# Patient Record
Sex: FEMALE | Race: OTHER | HISPANIC OR LATINO | Employment: FULL TIME | URBAN - NONMETROPOLITAN AREA
[De-identification: names, ages, dates, MRNs, and addresses within clinical notes are randomized per-mention and may not be internally consistent; named-entity substitution may affect disease eponyms.]

---

## 2021-10-10 ENCOUNTER — APPOINTMENT (EMERGENCY)
Dept: ULTRASOUND IMAGING | Facility: HOSPITAL | Age: 36
End: 2021-10-10
Payer: COMMERCIAL

## 2021-10-10 ENCOUNTER — HOSPITAL ENCOUNTER (EMERGENCY)
Facility: HOSPITAL | Age: 36
Discharge: HOME | End: 2021-10-10
Attending: EMERGENCY MEDICINE
Payer: COMMERCIAL

## 2021-10-10 VITALS
RESPIRATION RATE: 18 BRPM | OXYGEN SATURATION: 98 % | WEIGHT: 190 LBS | BODY MASS INDEX: 31.65 KG/M2 | SYSTOLIC BLOOD PRESSURE: 101 MMHG | HEIGHT: 65 IN | DIASTOLIC BLOOD PRESSURE: 52 MMHG | TEMPERATURE: 98.7 F | HEART RATE: 83 BPM

## 2021-10-10 DIAGNOSIS — R82.71 BACTERIA IN URINE: ICD-10-CM

## 2021-10-10 DIAGNOSIS — O20.9 VAGINAL BLEEDING IN PREGNANCY, FIRST TRIMESTER: Primary | ICD-10-CM

## 2021-10-10 LAB
ABO GROUP BLD: NORMAL
B-HCG SERPL-ACNC: 9882.4 MIU/ML
BACTERIA UR QL AUTO: NORMAL /HPF
BASOPHILS # BLD AUTO: 0.06 THOUSANDS/ΜL (ref 0–0.1)
BASOPHILS NFR BLD AUTO: 1 % (ref 0–1)
BILIRUB UR QL STRIP: NEGATIVE
CLARITY UR: ABNORMAL
COLOR UR: YELLOW
EOSINOPHIL # BLD AUTO: 0.39 THOUSAND/ΜL (ref 0–0.61)
EOSINOPHIL NFR BLD AUTO: 5 % (ref 0–6)
ERYTHROCYTE [DISTWIDTH] IN BLOOD BY AUTOMATED COUNT: 13.6 % (ref 11.6–15.1)
GLUCOSE UR STRIP-MCNC: NEGATIVE MG/DL
HCT VFR BLD AUTO: 43.2 % (ref 34.8–46.1)
HGB BLD-MCNC: 14 G/DL (ref 11.5–15.4)
HGB UR QL STRIP.AUTO: NEGATIVE
IMM GRANULOCYTES # BLD AUTO: 0.02 THOUSAND/UL (ref 0–0.2)
IMM GRANULOCYTES NFR BLD AUTO: 0 % (ref 0–2)
KETONES UR STRIP-MCNC: NEGATIVE MG/DL
LEUKOCYTE ESTERASE UR QL STRIP: ABNORMAL
LYMPHOCYTES # BLD AUTO: 2.13 THOUSANDS/ΜL (ref 0.6–4.47)
LYMPHOCYTES NFR BLD AUTO: 27 % (ref 14–44)
MCH RBC QN AUTO: 26.6 PG (ref 26.8–34.3)
MCHC RBC AUTO-ENTMCNC: 32.4 G/DL (ref 31.4–37.4)
MCV RBC AUTO: 82 FL (ref 82–98)
MONOCYTES # BLD AUTO: 0.39 THOUSAND/ΜL (ref 0.17–1.22)
MONOCYTES NFR BLD AUTO: 5 % (ref 4–12)
NEUTROPHILS # BLD AUTO: 4.95 THOUSANDS/ΜL (ref 1.85–7.62)
NEUTS SEG NFR BLD AUTO: 62 % (ref 43–75)
NITRITE UR QL STRIP: NEGATIVE
NON-SQ EPI CELLS URNS QL MICRO: NORMAL /HPF
NRBC BLD AUTO-RTO: 0 /100 WBCS
PH UR STRIP.AUTO: 6.5 [PH]
PLATELET # BLD AUTO: 363 THOUSANDS/UL (ref 149–390)
PMV BLD AUTO: 10.8 FL (ref 8.9–12.7)
PROT UR STRIP-MCNC: NEGATIVE MG/DL
RBC # BLD AUTO: 5.27 MILLION/UL (ref 3.81–5.12)
RBC #/AREA URNS AUTO: NORMAL /HPF
RH BLD: POSITIVE
SP GR UR STRIP.AUTO: 1.01 (ref 1–1.03)
UROBILINOGEN UR QL STRIP.AUTO: 0.2 E.U./DL
WBC # BLD AUTO: 7.94 THOUSAND/UL (ref 4.31–10.16)
WBC #/AREA URNS AUTO: NORMAL /HPF

## 2021-10-10 PROCEDURE — 36415 COLL VENOUS BLD VENIPUNCTURE: CPT | Performed by: EMERGENCY MEDICINE

## 2021-10-10 PROCEDURE — 85025 COMPLETE CBC W/AUTO DIFF WBC: CPT | Performed by: EMERGENCY MEDICINE

## 2021-10-10 PROCEDURE — 99284 EMERGENCY DEPT VISIT MOD MDM: CPT | Performed by: EMERGENCY MEDICINE

## 2021-10-10 PROCEDURE — 81001 URINALYSIS AUTO W/SCOPE: CPT | Performed by: EMERGENCY MEDICINE

## 2021-10-10 PROCEDURE — 86900 BLOOD TYPING SEROLOGIC ABO: CPT | Performed by: EMERGENCY MEDICINE

## 2021-10-10 PROCEDURE — 99284 EMERGENCY DEPT VISIT MOD MDM: CPT

## 2021-10-10 PROCEDURE — 86901 BLOOD TYPING SEROLOGIC RH(D): CPT | Performed by: EMERGENCY MEDICINE

## 2021-10-10 PROCEDURE — 87086 URINE CULTURE/COLONY COUNT: CPT | Performed by: EMERGENCY MEDICINE

## 2021-10-10 PROCEDURE — 84702 CHORIONIC GONADOTROPIN TEST: CPT | Performed by: EMERGENCY MEDICINE

## 2021-10-10 PROCEDURE — 76801 OB US < 14 WKS SINGLE FETUS: CPT

## 2021-10-10 RX ORDER — PNV NO.95/FERROUS FUM/FOLIC AC 28MG-0.8MG
1 TABLET ORAL DAILY
Qty: 30 TABLET | Refills: 0 | Status: SHIPPED | OUTPATIENT
Start: 2021-10-10 | End: 2022-01-25

## 2021-10-10 RX ORDER — OMEPRAZOLE 20 MG/1
20 CAPSULE, DELAYED RELEASE ORAL DAILY
COMMUNITY

## 2021-10-10 RX ORDER — CEPHALEXIN 500 MG/1
500 CAPSULE ORAL EVERY 8 HOURS SCHEDULED
Qty: 15 CAPSULE | Refills: 0 | Status: SHIPPED | OUTPATIENT
Start: 2021-10-10 | End: 2021-10-15

## 2021-10-11 LAB — BACTERIA UR CULT: NORMAL

## 2021-10-13 ENCOUNTER — APPOINTMENT (EMERGENCY)
Dept: ULTRASOUND IMAGING | Facility: HOSPITAL | Age: 36
End: 2021-10-13
Payer: COMMERCIAL

## 2021-10-13 ENCOUNTER — HOSPITAL ENCOUNTER (EMERGENCY)
Facility: HOSPITAL | Age: 36
Discharge: HOME | End: 2021-10-13
Attending: EMERGENCY MEDICINE
Payer: COMMERCIAL

## 2021-10-13 VITALS
BODY MASS INDEX: 31.62 KG/M2 | DIASTOLIC BLOOD PRESSURE: 68 MMHG | SYSTOLIC BLOOD PRESSURE: 108 MMHG | HEART RATE: 80 BPM | WEIGHT: 190 LBS | OXYGEN SATURATION: 99 % | RESPIRATION RATE: 18 BRPM | TEMPERATURE: 97.8 F

## 2021-10-13 DIAGNOSIS — O03.9 COMPLETE MISCARRIAGE: Primary | ICD-10-CM

## 2021-10-13 LAB
ANION GAP SERPL CALCULATED.3IONS-SCNC: 12 MMOL/L (ref 4–13)
B-HCG SERPL-ACNC: 2638.6 MIU/ML
BASOPHILS # BLD AUTO: 0.07 THOUSANDS/ΜL (ref 0–0.1)
BASOPHILS NFR BLD AUTO: 1 % (ref 0–1)
BUN SERPL-MCNC: 8 MG/DL (ref 5–25)
CALCIUM SERPL-MCNC: 9.1 MG/DL (ref 8.3–10.1)
CHLORIDE SERPL-SCNC: 100 MMOL/L (ref 100–108)
CO2 SERPL-SCNC: 25 MMOL/L (ref 21–32)
CREAT SERPL-MCNC: 0.64 MG/DL (ref 0.6–1.3)
EOSINOPHIL # BLD AUTO: 0.58 THOUSAND/ΜL (ref 0–0.61)
EOSINOPHIL NFR BLD AUTO: 5 % (ref 0–6)
ERYTHROCYTE [DISTWIDTH] IN BLOOD BY AUTOMATED COUNT: 13.7 % (ref 11.6–15.1)
GFR SERPL CREATININE-BSD FRML MDRD: 114 ML/MIN/1.73SQ M
GLUCOSE SERPL-MCNC: 95 MG/DL (ref 65–140)
HCT VFR BLD AUTO: 42.6 % (ref 34.8–46.1)
HGB BLD-MCNC: 13.6 G/DL (ref 11.5–15.4)
IMM GRANULOCYTES # BLD AUTO: 0.03 THOUSAND/UL (ref 0–0.2)
IMM GRANULOCYTES NFR BLD AUTO: 0 % (ref 0–2)
LYMPHOCYTES # BLD AUTO: 2.63 THOUSANDS/ΜL (ref 0.6–4.47)
LYMPHOCYTES NFR BLD AUTO: 23 % (ref 14–44)
MCH RBC QN AUTO: 26.3 PG (ref 26.8–34.3)
MCHC RBC AUTO-ENTMCNC: 31.9 G/DL (ref 31.4–37.4)
MCV RBC AUTO: 82 FL (ref 82–98)
MONOCYTES # BLD AUTO: 0.6 THOUSAND/ΜL (ref 0.17–1.22)
MONOCYTES NFR BLD AUTO: 5 % (ref 4–12)
NEUTROPHILS # BLD AUTO: 7.45 THOUSANDS/ΜL (ref 1.85–7.62)
NEUTS SEG NFR BLD AUTO: 66 % (ref 43–75)
NRBC BLD AUTO-RTO: 0 /100 WBCS
PLATELET # BLD AUTO: 369 THOUSANDS/UL (ref 149–390)
PMV BLD AUTO: 10.5 FL (ref 8.9–12.7)
POTASSIUM SERPL-SCNC: 4.1 MMOL/L (ref 3.5–5.3)
RBC # BLD AUTO: 5.18 MILLION/UL (ref 3.81–5.12)
SODIUM SERPL-SCNC: 137 MMOL/L (ref 136–145)
WBC # BLD AUTO: 11.36 THOUSAND/UL (ref 4.31–10.16)

## 2021-10-13 PROCEDURE — 80048 BASIC METABOLIC PNL TOTAL CA: CPT | Performed by: PHYSICIAN ASSISTANT

## 2021-10-13 PROCEDURE — 96374 THER/PROPH/DIAG INJ IV PUSH: CPT

## 2021-10-13 PROCEDURE — 84702 CHORIONIC GONADOTROPIN TEST: CPT | Performed by: PHYSICIAN ASSISTANT

## 2021-10-13 PROCEDURE — 99285 EMERGENCY DEPT VISIT HI MDM: CPT | Performed by: PHYSICIAN ASSISTANT

## 2021-10-13 PROCEDURE — 76815 OB US LIMITED FETUS(S): CPT

## 2021-10-13 PROCEDURE — 96375 TX/PRO/DX INJ NEW DRUG ADDON: CPT

## 2021-10-13 PROCEDURE — 36415 COLL VENOUS BLD VENIPUNCTURE: CPT | Performed by: PHYSICIAN ASSISTANT

## 2021-10-13 PROCEDURE — 99284 EMERGENCY DEPT VISIT MOD MDM: CPT

## 2021-10-13 PROCEDURE — 85025 COMPLETE CBC W/AUTO DIFF WBC: CPT | Performed by: PHYSICIAN ASSISTANT

## 2021-10-13 RX ORDER — KETOROLAC TROMETHAMINE 10 MG/1
10 TABLET, FILM COATED ORAL EVERY 6 HOURS PRN
Qty: 20 TABLET | Refills: 0 | Status: SHIPPED | OUTPATIENT
Start: 2021-10-13 | End: 2022-01-25

## 2021-10-13 RX ORDER — ONDANSETRON 2 MG/ML
4 INJECTION INTRAMUSCULAR; INTRAVENOUS ONCE
Status: COMPLETED | OUTPATIENT
Start: 2021-10-13 | End: 2021-10-13

## 2021-10-13 RX ORDER — KETOROLAC TROMETHAMINE 30 MG/ML
15 INJECTION, SOLUTION INTRAMUSCULAR; INTRAVENOUS ONCE
Status: COMPLETED | OUTPATIENT
Start: 2021-10-13 | End: 2021-10-13

## 2021-10-13 RX ORDER — OXYCODONE HYDROCHLORIDE 10 MG/1
10 TABLET ORAL ONCE
Status: COMPLETED | OUTPATIENT
Start: 2021-10-13 | End: 2021-10-13

## 2021-10-13 RX ORDER — ACETAMINOPHEN 325 MG/1
650 TABLET ORAL ONCE
Status: COMPLETED | OUTPATIENT
Start: 2021-10-13 | End: 2021-10-13

## 2021-10-13 RX ORDER — HYDROCODONE BITARTRATE AND ACETAMINOPHEN 5; 325 MG/1; MG/1
1 TABLET ORAL EVERY 6 HOURS PRN
Qty: 6 TABLET | Refills: 0 | Status: SHIPPED | OUTPATIENT
Start: 2021-10-13 | End: 2022-01-25

## 2021-10-13 RX ADMIN — MORPHINE SULFATE 2 MG: 2 INJECTION, SOLUTION INTRAMUSCULAR; INTRAVENOUS at 12:32

## 2021-10-13 RX ADMIN — ONDANSETRON 4 MG: 2 INJECTION INTRAMUSCULAR; INTRAVENOUS at 12:32

## 2021-10-13 RX ADMIN — OXYCODONE HYDROCHLORIDE 10 MG: 10 TABLET ORAL at 14:56

## 2021-10-13 RX ADMIN — KETOROLAC TROMETHAMINE 15 MG: 30 INJECTION, SOLUTION INTRAMUSCULAR; INTRAVENOUS at 14:57

## 2021-10-13 RX ADMIN — ACETAMINOPHEN 650 MG: 325 TABLET, FILM COATED ORAL at 12:31

## 2021-12-15 ENCOUNTER — HOSPITAL ENCOUNTER (EMERGENCY)
Facility: HOSPITAL | Age: 36
Discharge: HOME | End: 2021-12-15
Attending: EMERGENCY MEDICINE
Payer: COMMERCIAL

## 2021-12-15 ENCOUNTER — APPOINTMENT (EMERGENCY)
Dept: ULTRASOUND IMAGING | Facility: HOSPITAL | Age: 36
End: 2021-12-15
Payer: COMMERCIAL

## 2021-12-15 VITALS
SYSTOLIC BLOOD PRESSURE: 128 MMHG | BODY MASS INDEX: 31.65 KG/M2 | DIASTOLIC BLOOD PRESSURE: 72 MMHG | WEIGHT: 190 LBS | HEART RATE: 91 BPM | RESPIRATION RATE: 18 BRPM | HEIGHT: 65 IN | OXYGEN SATURATION: 98 % | TEMPERATURE: 98.7 F

## 2021-12-15 DIAGNOSIS — O20.9 VAGINAL BLEEDING IN PREGNANCY, FIRST TRIMESTER: Primary | ICD-10-CM

## 2021-12-15 LAB
ALBUMIN SERPL BCP-MCNC: 4.1 G/DL (ref 3.5–5)
ALP SERPL-CCNC: 74 U/L (ref 46–116)
ALT SERPL W P-5'-P-CCNC: 33 U/L (ref 12–78)
ANION GAP SERPL CALCULATED.3IONS-SCNC: 8 MMOL/L (ref 4–13)
AST SERPL W P-5'-P-CCNC: 24 U/L (ref 5–45)
B-HCG SERPL-ACNC: 532.8 MIU/ML
BACTERIA UR QL AUTO: ABNORMAL /HPF
BASOPHILS # BLD AUTO: 0.05 THOUSANDS/ΜL (ref 0–0.1)
BASOPHILS NFR BLD AUTO: 1 % (ref 0–1)
BILIRUB SERPL-MCNC: 0.45 MG/DL (ref 0.2–1)
BILIRUB UR QL STRIP: NEGATIVE
BUN SERPL-MCNC: 10 MG/DL (ref 5–25)
CALCIUM SERPL-MCNC: 9.4 MG/DL (ref 8.3–10.1)
CHLORIDE SERPL-SCNC: 103 MMOL/L (ref 100–108)
CLARITY UR: ABNORMAL
CO2 SERPL-SCNC: 28 MMOL/L (ref 21–32)
COLOR UR: ABNORMAL
CREAT SERPL-MCNC: 0.79 MG/DL (ref 0.6–1.3)
EOSINOPHIL # BLD AUTO: 0.39 THOUSAND/ΜL (ref 0–0.61)
EOSINOPHIL NFR BLD AUTO: 4 % (ref 0–6)
ERYTHROCYTE [DISTWIDTH] IN BLOOD BY AUTOMATED COUNT: 14.4 % (ref 11.6–15.1)
GFR SERPL CREATININE-BSD FRML MDRD: 95 ML/MIN/1.73SQ M
GLUCOSE SERPL-MCNC: 98 MG/DL (ref 65–140)
GLUCOSE UR STRIP-MCNC: NEGATIVE MG/DL
HCT VFR BLD AUTO: 40.9 % (ref 34.8–46.1)
HGB BLD-MCNC: 13.5 G/DL (ref 11.5–15.4)
HGB UR QL STRIP.AUTO: ABNORMAL
IMM GRANULOCYTES # BLD AUTO: 0.02 THOUSAND/UL (ref 0–0.2)
IMM GRANULOCYTES NFR BLD AUTO: 0 % (ref 0–2)
KETONES UR STRIP-MCNC: NEGATIVE MG/DL
LEUKOCYTE ESTERASE UR QL STRIP: ABNORMAL
LIPASE SERPL-CCNC: 85 U/L (ref 73–393)
LYMPHOCYTES # BLD AUTO: 2.45 THOUSANDS/ΜL (ref 0.6–4.47)
LYMPHOCYTES NFR BLD AUTO: 24 % (ref 14–44)
MCH RBC QN AUTO: 26.6 PG (ref 26.8–34.3)
MCHC RBC AUTO-ENTMCNC: 33 G/DL (ref 31.4–37.4)
MCV RBC AUTO: 81 FL (ref 82–98)
MONOCYTES # BLD AUTO: 0.63 THOUSAND/ΜL (ref 0.17–1.22)
MONOCYTES NFR BLD AUTO: 6 % (ref 4–12)
NEUTROPHILS # BLD AUTO: 6.59 THOUSANDS/ΜL (ref 1.85–7.62)
NEUTS SEG NFR BLD AUTO: 65 % (ref 43–75)
NITRITE UR QL STRIP: NEGATIVE
NON-SQ EPI CELLS URNS QL MICRO: ABNORMAL /HPF
NRBC BLD AUTO-RTO: 0 /100 WBCS
PH UR STRIP.AUTO: 7.5 [PH]
PLATELET # BLD AUTO: 364 THOUSANDS/UL (ref 149–390)
PMV BLD AUTO: 10.8 FL (ref 8.9–12.7)
POTASSIUM SERPL-SCNC: 4.2 MMOL/L (ref 3.5–5.3)
PROT SERPL-MCNC: 7.9 G/DL (ref 6.4–8.2)
PROT UR STRIP-MCNC: ABNORMAL MG/DL
RBC # BLD AUTO: 5.08 MILLION/UL (ref 3.81–5.12)
RBC #/AREA URNS AUTO: ABNORMAL /HPF
SODIUM SERPL-SCNC: 139 MMOL/L (ref 136–145)
SP GR UR STRIP.AUTO: 1.02 (ref 1–1.03)
UROBILINOGEN UR QL STRIP.AUTO: 0.2 E.U./DL
WBC # BLD AUTO: 10.13 THOUSAND/UL (ref 4.31–10.16)
WBC #/AREA URNS AUTO: ABNORMAL /HPF

## 2021-12-15 PROCEDURE — 99284 EMERGENCY DEPT VISIT MOD MDM: CPT

## 2021-12-15 PROCEDURE — 96365 THER/PROPH/DIAG IV INF INIT: CPT

## 2021-12-15 PROCEDURE — 81001 URINALYSIS AUTO W/SCOPE: CPT | Performed by: EMERGENCY MEDICINE

## 2021-12-15 PROCEDURE — 96366 THER/PROPH/DIAG IV INF ADDON: CPT

## 2021-12-15 PROCEDURE — 99285 EMERGENCY DEPT VISIT HI MDM: CPT | Performed by: EMERGENCY MEDICINE

## 2021-12-15 PROCEDURE — 85025 COMPLETE CBC W/AUTO DIFF WBC: CPT | Performed by: EMERGENCY MEDICINE

## 2021-12-15 PROCEDURE — 76801 OB US < 14 WKS SINGLE FETUS: CPT

## 2021-12-15 PROCEDURE — 87086 URINE CULTURE/COLONY COUNT: CPT | Performed by: EMERGENCY MEDICINE

## 2021-12-15 PROCEDURE — 80053 COMPREHEN METABOLIC PANEL: CPT | Performed by: EMERGENCY MEDICINE

## 2021-12-15 PROCEDURE — 96375 TX/PRO/DX INJ NEW DRUG ADDON: CPT

## 2021-12-15 PROCEDURE — 84702 CHORIONIC GONADOTROPIN TEST: CPT | Performed by: EMERGENCY MEDICINE

## 2021-12-15 PROCEDURE — 87147 CULTURE TYPE IMMUNOLOGIC: CPT | Performed by: EMERGENCY MEDICINE

## 2021-12-15 PROCEDURE — 36415 COLL VENOUS BLD VENIPUNCTURE: CPT | Performed by: EMERGENCY MEDICINE

## 2021-12-15 PROCEDURE — 83690 ASSAY OF LIPASE: CPT | Performed by: EMERGENCY MEDICINE

## 2021-12-15 RX ORDER — ONDANSETRON 2 MG/ML
4 INJECTION INTRAMUSCULAR; INTRAVENOUS ONCE
Status: COMPLETED | OUTPATIENT
Start: 2021-12-15 | End: 2021-12-15

## 2021-12-15 RX ORDER — NITROFURANTOIN 25; 75 MG/1; MG/1
100 CAPSULE ORAL 2 TIMES DAILY
Qty: 14 CAPSULE | Refills: 0 | Status: SHIPPED | OUTPATIENT
Start: 2021-12-15 | End: 2021-12-22

## 2021-12-15 RX ORDER — FENTANYL CITRATE 50 UG/ML
25 INJECTION, SOLUTION INTRAMUSCULAR; INTRAVENOUS ONCE
Status: COMPLETED | OUTPATIENT
Start: 2021-12-15 | End: 2021-12-15

## 2021-12-15 RX ADMIN — FENTANYL CITRATE 25 MCG: 0.05 INJECTION, SOLUTION INTRAMUSCULAR; INTRAVENOUS at 15:05

## 2021-12-15 RX ADMIN — ONDANSETRON 4 MG: 2 INJECTION INTRAMUSCULAR; INTRAVENOUS at 15:03

## 2021-12-15 RX ADMIN — SODIUM CHLORIDE, SODIUM LACTATE, POTASSIUM CHLORIDE, AND CALCIUM CHLORIDE 1000 ML: .6; .31; .03; .02 INJECTION, SOLUTION INTRAVENOUS at 13:34

## 2021-12-16 LAB
BACTERIA UR CULT: ABNORMAL
BACTERIA UR CULT: ABNORMAL

## 2022-01-25 ENCOUNTER — APPOINTMENT (EMERGENCY)
Dept: ULTRASOUND IMAGING | Facility: HOSPITAL | Age: 37
End: 2022-01-25
Payer: COMMERCIAL

## 2022-01-25 ENCOUNTER — APPOINTMENT (EMERGENCY)
Dept: CT IMAGING | Facility: HOSPITAL | Age: 37
End: 2022-01-25
Payer: COMMERCIAL

## 2022-01-25 ENCOUNTER — HOSPITAL ENCOUNTER (EMERGENCY)
Facility: HOSPITAL | Age: 37
Discharge: HOME | End: 2022-01-25
Attending: EMERGENCY MEDICINE | Admitting: EMERGENCY MEDICINE
Payer: COMMERCIAL

## 2022-01-25 VITALS
TEMPERATURE: 99 F | HEART RATE: 84 BPM | WEIGHT: 201.06 LBS | SYSTOLIC BLOOD PRESSURE: 103 MMHG | DIASTOLIC BLOOD PRESSURE: 51 MMHG | OXYGEN SATURATION: 99 % | BODY MASS INDEX: 33.46 KG/M2 | RESPIRATION RATE: 18 BRPM

## 2022-01-25 DIAGNOSIS — E34.9 ELEVATED SERUM HCG: ICD-10-CM

## 2022-01-25 DIAGNOSIS — M54.50 LOW BACK PAIN: ICD-10-CM

## 2022-01-25 DIAGNOSIS — N39.0 URINARY TRACT INFECTION: ICD-10-CM

## 2022-01-25 DIAGNOSIS — R10.31 RLQ ABDOMINAL PAIN: Primary | ICD-10-CM

## 2022-01-25 LAB
ALBUMIN SERPL BCP-MCNC: 4.2 G/DL (ref 3.5–5)
ALP SERPL-CCNC: 74 U/L (ref 46–116)
ALT SERPL W P-5'-P-CCNC: 34 U/L (ref 12–78)
ANION GAP SERPL CALCULATED.3IONS-SCNC: 10 MMOL/L (ref 4–13)
AST SERPL W P-5'-P-CCNC: 29 U/L (ref 5–45)
B-HCG SERPL-ACNC: 317.8 MIU/ML
BACTERIA UR QL AUTO: ABNORMAL /HPF
BASOPHILS # BLD AUTO: 0.06 THOUSANDS/ΜL (ref 0–0.1)
BASOPHILS NFR BLD AUTO: 1 % (ref 0–1)
BILIRUB SERPL-MCNC: 0.74 MG/DL (ref 0.2–1)
BILIRUB UR QL STRIP: NEGATIVE
BUN SERPL-MCNC: 14 MG/DL (ref 5–25)
CALCIUM SERPL-MCNC: 9.2 MG/DL (ref 8.3–10.1)
CHLORIDE SERPL-SCNC: 99 MMOL/L (ref 100–108)
CLARITY UR: ABNORMAL
CO2 SERPL-SCNC: 24 MMOL/L (ref 21–32)
COLOR UR: YELLOW
CREAT SERPL-MCNC: 0.84 MG/DL (ref 0.6–1.3)
EOSINOPHIL # BLD AUTO: 0.3 THOUSAND/ΜL (ref 0–0.61)
EOSINOPHIL NFR BLD AUTO: 3 % (ref 0–6)
ERYTHROCYTE [DISTWIDTH] IN BLOOD BY AUTOMATED COUNT: 14.5 % (ref 11.6–15.1)
EXT PREG TEST URINE: POSITIVE
EXT. CONTROL ED NAV: NORMAL
GFR SERPL CREATININE-BSD FRML MDRD: 89 ML/MIN/1.73SQ M
GLUCOSE SERPL-MCNC: 98 MG/DL (ref 65–140)
GLUCOSE UR STRIP-MCNC: NEGATIVE MG/DL
HCT VFR BLD AUTO: 43.8 % (ref 34.8–46.1)
HGB BLD-MCNC: 14.5 G/DL (ref 11.5–15.4)
HGB UR QL STRIP.AUTO: ABNORMAL
IMM GRANULOCYTES # BLD AUTO: 0.03 THOUSAND/UL (ref 0–0.2)
IMM GRANULOCYTES NFR BLD AUTO: 0 % (ref 0–2)
KETONES UR STRIP-MCNC: ABNORMAL MG/DL
LACTATE SERPL-SCNC: 0.8 MMOL/L (ref 0.5–2)
LEUKOCYTE ESTERASE UR QL STRIP: ABNORMAL
LYMPHOCYTES # BLD AUTO: 2.5 THOUSANDS/ΜL (ref 0.6–4.47)
LYMPHOCYTES NFR BLD AUTO: 27 % (ref 14–44)
MCH RBC QN AUTO: 26 PG (ref 26.8–34.3)
MCHC RBC AUTO-ENTMCNC: 33.1 G/DL (ref 31.4–37.4)
MCV RBC AUTO: 79 FL (ref 82–98)
MONOCYTES # BLD AUTO: 0.64 THOUSAND/ΜL (ref 0.17–1.22)
MONOCYTES NFR BLD AUTO: 7 % (ref 4–12)
NEUTROPHILS # BLD AUTO: 5.66 THOUSANDS/ΜL (ref 1.85–7.62)
NEUTS SEG NFR BLD AUTO: 62 % (ref 43–75)
NITRITE UR QL STRIP: NEGATIVE
NON-SQ EPI CELLS URNS QL MICRO: ABNORMAL /HPF
NRBC BLD AUTO-RTO: 0 /100 WBCS
PH UR STRIP.AUTO: 6 [PH]
PLATELET # BLD AUTO: 331 THOUSANDS/UL (ref 149–390)
PMV BLD AUTO: 11 FL (ref 8.9–12.7)
POTASSIUM SERPL-SCNC: 3.5 MMOL/L (ref 3.5–5.3)
PROT SERPL-MCNC: 8.4 G/DL (ref 6.4–8.2)
PROT UR STRIP-MCNC: NEGATIVE MG/DL
RBC # BLD AUTO: 5.57 MILLION/UL (ref 3.81–5.12)
RBC #/AREA URNS AUTO: ABNORMAL /HPF
SODIUM SERPL-SCNC: 133 MMOL/L (ref 136–145)
SP GR UR STRIP.AUTO: >=1.03 (ref 1–1.03)
UROBILINOGEN UR QL STRIP.AUTO: 0.2 E.U./DL
WBC # BLD AUTO: 9.19 THOUSAND/UL (ref 4.31–10.16)
WBC #/AREA URNS AUTO: ABNORMAL /HPF

## 2022-01-25 PROCEDURE — 96361 HYDRATE IV INFUSION ADD-ON: CPT

## 2022-01-25 PROCEDURE — 81001 URINALYSIS AUTO W/SCOPE: CPT | Performed by: EMERGENCY MEDICINE

## 2022-01-25 PROCEDURE — 83605 ASSAY OF LACTIC ACID: CPT | Performed by: EMERGENCY MEDICINE

## 2022-01-25 PROCEDURE — 76801 OB US < 14 WKS SINGLE FETUS: CPT

## 2022-01-25 PROCEDURE — 80053 COMPREHEN METABOLIC PANEL: CPT | Performed by: EMERGENCY MEDICINE

## 2022-01-25 PROCEDURE — 87086 URINE CULTURE/COLONY COUNT: CPT | Performed by: EMERGENCY MEDICINE

## 2022-01-25 PROCEDURE — 36415 COLL VENOUS BLD VENIPUNCTURE: CPT | Performed by: EMERGENCY MEDICINE

## 2022-01-25 PROCEDURE — 74177 CT ABD & PELVIS W/CONTRAST: CPT

## 2022-01-25 PROCEDURE — 99284 EMERGENCY DEPT VISIT MOD MDM: CPT

## 2022-01-25 PROCEDURE — 85025 COMPLETE CBC W/AUTO DIFF WBC: CPT | Performed by: EMERGENCY MEDICINE

## 2022-01-25 PROCEDURE — G1004 CDSM NDSC: HCPCS

## 2022-01-25 PROCEDURE — 99285 EMERGENCY DEPT VISIT HI MDM: CPT | Performed by: EMERGENCY MEDICINE

## 2022-01-25 PROCEDURE — 96375 TX/PRO/DX INJ NEW DRUG ADDON: CPT

## 2022-01-25 PROCEDURE — 81025 URINE PREGNANCY TEST: CPT | Performed by: EMERGENCY MEDICINE

## 2022-01-25 PROCEDURE — 96376 TX/PRO/DX INJ SAME DRUG ADON: CPT

## 2022-01-25 PROCEDURE — 84702 CHORIONIC GONADOTROPIN TEST: CPT | Performed by: EMERGENCY MEDICINE

## 2022-01-25 PROCEDURE — 96374 THER/PROPH/DIAG INJ IV PUSH: CPT

## 2022-01-25 RX ORDER — CEPHALEXIN 250 MG/1
500 CAPSULE ORAL ONCE
Status: COMPLETED | OUTPATIENT
Start: 2022-01-25 | End: 2022-01-25

## 2022-01-25 RX ORDER — CEPHALEXIN 500 MG/1
500 CAPSULE ORAL EVERY 8 HOURS SCHEDULED
Qty: 12 CAPSULE | Refills: 0 | Status: SHIPPED | OUTPATIENT
Start: 2022-01-25 | End: 2022-01-29

## 2022-01-25 RX ORDER — HYDROMORPHONE HCL/PF 1 MG/ML
0.5 SYRINGE (ML) INJECTION ONCE
Status: COMPLETED | OUTPATIENT
Start: 2022-01-25 | End: 2022-01-25

## 2022-01-25 RX ORDER — KETOROLAC TROMETHAMINE 30 MG/ML
15 INJECTION, SOLUTION INTRAMUSCULAR; INTRAVENOUS ONCE
Status: DISCONTINUED | OUTPATIENT
Start: 2022-01-25 | End: 2022-01-25

## 2022-01-25 RX ORDER — ONDANSETRON 2 MG/ML
4 INJECTION INTRAMUSCULAR; INTRAVENOUS ONCE
Status: COMPLETED | OUTPATIENT
Start: 2022-01-25 | End: 2022-01-25

## 2022-01-25 RX ADMIN — HYDROMORPHONE HYDROCHLORIDE 0.5 MG: 1 INJECTION, SOLUTION INTRAMUSCULAR; INTRAVENOUS; SUBCUTANEOUS at 14:44

## 2022-01-25 RX ADMIN — SODIUM CHLORIDE 1000 ML: 9 INJECTION, SOLUTION INTRAVENOUS at 09:22

## 2022-01-25 RX ADMIN — IOHEXOL 100 ML: 350 INJECTION, SOLUTION INTRAVENOUS at 14:18

## 2022-01-25 RX ADMIN — ONDANSETRON 4 MG: 2 INJECTION INTRAMUSCULAR; INTRAVENOUS at 09:22

## 2022-01-25 RX ADMIN — HYDROMORPHONE HYDROCHLORIDE 0.5 MG: 1 INJECTION, SOLUTION INTRAMUSCULAR; INTRAVENOUS; SUBCUTANEOUS at 09:44

## 2022-01-25 RX ADMIN — CEPHALEXIN 500 MG: 250 CAPSULE ORAL at 15:33

## 2022-01-25 RX ADMIN — HYDROMORPHONE HYDROCHLORIDE 0.5 MG: 1 INJECTION, SOLUTION INTRAMUSCULAR; INTRAVENOUS; SUBCUTANEOUS at 11:29

## 2022-01-25 NOTE — Clinical Note
Eliot Velazquez was seen and treated in our emergency department on 1/25/2022  Diagnosis:     Bry Oneal  may return to work on return date  She may return on this date: 01/28/2022    Please excuse absence on 1/25 through 1/27     If you have any questions or concerns, please don't hesitate to call        Tessie Woodall MD    ______________________________           _______________          _______________  Hospital Representative                              Date                                Time

## 2022-01-25 NOTE — DISCHARGE INSTRUCTIONS
Please follow-up with outpatient lab to obtain your repeat HCG lab on Thursday, 1/27, in 2 days  Please establish care with OBGYN as soon as possible, particularly in light of your recent miscarriages and your current elevated hCG and abdominal pains  If you develop severe pain, lightheadedness, or profuse vaginal bleeding, please seek medical attention right away  Since you are having symptoms of urinary tract infection, please start cephalexin and completed the course of the antibiotic

## 2022-01-25 NOTE — ED PROVIDER NOTES
EMERGENCY DEPARTMENT ENCOUNTER NOTE    This note has been generated using a voice recognition software  There may be typographic, grammatic, or word substitution errors that have escaped editorial review  ? CHIEF COMPLAINT  Chief Complaint   Patient presents with    Back Pain     having hot flashes with low back pain radiating to right flank  some nausea noted       HPI  Mayo Severs is a 39 y o  female with PMH of sickle cell trait presenting with right lower back pain and right flank pain  Patient reports burning up at nighttime over the past 1 week  In the past 2 days, she developed low back pain with radiation to right flank that is quite bothersome, currently 8/10  She has been taking Tylenol without much relief  There is some nausea but no vomiting  Appetite has been decreased  No diarrhea  Does note some constipation, last bowel movement yesterday  No burning with urination  No vaginal discharge  Patient reports having 2 back-to-back spontaneous miscarriages with the most recent 1 in December, has not yet had a menstrual cycle since the last miscarriage  Past surgical history significant for cholecystectomy  REVIEW OF SYSTEMS    Constitutional:  Reports feeling hot flashes, denies chills  Visual/Eyes: no changes in vision  HENT: no rhinorrhea, no sore throat  Cardiac: no chest pain  Respiratory: no shortness of breath, no cough  GI:  As above  : no burning with urination  Heme/Onc: no easy bruising  Endocrine: no diabetes  Neuro: no focal weakness or numbness, no headaches    Ten systems reviewed and negative unless otherwise noted in HPI and above    PAST MEDICAL HISTORY  Past Medical History:   Diagnosis Date    Sickle cell trait ([facility] [address] )        SURGICAL HISTORY  Past Surgical History:   Procedure Laterality Date    CHOLECYSTECTOMY         FAMILY HISTORY  No family history on file  CURRENT MEDICATIONS  No current facility-administered medications on file prior to encounter  Current Outpatient Medications on File Prior to Encounter   Medication Sig    omeprazole (PriLOSEC) 20 mg delayed release capsule Take 20 mg by mouth daily    [DISCONTINUED] HYDROcodone-acetaminophen (Norco) 5-325 mg per tablet Take 1 tablet by mouth every 6 (six) hours as needed for painMax Daily Amount: 4 tablets    [DISCONTINUED] ketorolac (TORADOL) 10 mg tablet Take 1 tablet (10 mg total) by mouth every 6 (six) hours as needed for moderate pain    [DISCONTINUED] Prenatal Vit-Fe Fumarate-FA (prenatal vitamin) 28-0 8 mg Take 1 tablet by mouth daily for 30 doses       ALLERGIES  Allergies   Allergen Reactions    Shellfish Allergy - Food Allergy Hives       SOCIAL HISTORY  Social History     Socioeconomic History    Marital status: Single     Spouse name: Not on file    Number of children: Not on file    Years of education: Not on file    Highest education level: Not on file   Occupational History    Not on file   Tobacco Use    Smoking status: Never Smoker    Smokeless tobacco: Never Used   Vaping Use    Vaping Use: Never used   Substance and Sexual Activity    Alcohol use: Never    Drug use: Never    Sexual activity: Not on file   Other Topics Concern    Not on file   Social History Narrative    Not on file     Social Determinants of Health     Financial Resource Strain: Not on file   Food Insecurity: Not on file   Transportation Needs: Not on file   Physical Activity: Not on file   Stress: Not on file   Social Connections: Not on file   Intimate Partner Violence: Not on file   Housing Stability: Not on file       PHYSICAL EXAM    /75 (BP Location: Left arm)   Pulse (!) 126   Temp 99 °F (37 2 °C) (Temporal)   Resp 18   Wt 91 2 kg (201 lb 1 oz)   LMP 08/16/2021 (LMP Unknown)   SpO2 99%   Breastfeeding Unknown Comment: had misscarriage  dec 2021  BMI 33 46 kg/m²   Vital signs and nursing notes reviewed    CONSTITUTIONAL: female appearing stated age resting in bed, in no acute distress  HEENT: atraumatic, normocephalic  Sclera anicteric, conjunctiva are not injected  Moist oral mucosa  CARDIOVASCULAR/CHEST:  Tachycardic, regular, no M/R/G  2+ radial pulses  PULMONARY: Breathing comfortably on RA  Breath sounds are equal and clear to auscultation  ABDOMEN: non-distended  BS present, mildly hyperactive, tender to palpation in right periumbilical region and right upper quadrant  No rebound or guarding  MSK: moves all extremities, no deformities, no peripheral edema, no calf asymmetry  NEURO: Awake, alert, and oriented x 3  Face symmetric  Moves all extremities spontaneously  No focal neurologic deficits  SKIN: Warm, appears well-perfused  MENTAL STATUS: Normal affect      ?   LABS AND TESTS    Results Reviewed     Procedure Component Value Units Date/Time    Urine culture [[id number]] Collected: 01/25/22 0921    Lab Status: Final result Specimen: Urine, Clean Catch Updated: 01/26/22 1347     Urine Culture No Growth <1000 cfu/mL    Comprehensive metabolic panel [[id number]]  (Abnormal) Collected: 01/25/22 0921    Lab Status: Final result Specimen: Blood from Arm, Right Updated: 01/25/22 0957     Sodium 133 mmol/L      Potassium 3 5 mmol/L      Chloride 99 mmol/L      CO2 24 mmol/L      ANION GAP 10 mmol/L      BUN 14 mg/dL      Creatinine 0 84 mg/dL      Glucose 98 mg/dL      Calcium 9 2 mg/dL      AST 29 U/L      ALT 34 U/L      Alkaline Phosphatase 74 U/L      Total Protein 8 4 g/dL      Albumin 4 2 g/dL      Total Bilirubin 0 74 mg/dL      eGFR 89 ml/min/1 73sq m     Narrative:      [place] guidelines for Chronic Kidney Disease (CKD):     Stage 1 with normal or high GFR (GFR > 90 mL/min/1 73 square meters)    Stage 2 Mild CKD (GFR = 60-89 mL/min/1 73 square meters)    Stage 3A Moderate CKD (GFR = 45-59 mL/min/1 73 square meters)    Stage 3B Moderate CKD (GFR = 30-44 mL/min/1 73 square meters)    Stage 4 Severe CKD (GFR = 15-29 mL/min/1 73 square meters)   Stage 5 End Stage CKD (GFR <15 mL/min/1 73 square meters)  Note: GFR calculation is accurate only with a steady state creatinine    hCG, quantitative [[id number]]  (Abnormal) Collected: 01/25/22 0921    Lab Status: Final result Specimen: Blood from Arm, Right Updated: 01/25/22 0957     HCG, Quant 317 8 mIU/mL     Narrative:       Expected Ranges:     Approximate               Approximate HCG  Gestation age          Concentration ( mIU/mL)  _____________          ______________________   Michael Heredia                      HCG values  0 2-1                       5-50  1-2                           2-3                         100-5000  3-4                         500-06454  4-5                         1000-19351  5-6                         [id number]  6-8                         55699-275130  8-12                        43907-039450      Lactic acid [[id number]]  (Normal) Collected: 01/25/22 0921    Lab Status: Final result Specimen: Blood from Arm, Right Updated: 01/25/22 0950     LACTIC ACID 0 8 mmol/L     Narrative:      Result may be elevated if tourniquet was used during collection      Urine Microscopic [[id number]]  (Abnormal) Collected: 01/25/22 0921    Lab Status: Final result Specimen: Urine, Clean Catch Updated: 01/25/22 0944     RBC, UA 4-10 /hpf      WBC, UA 10-20 /hpf      Epithelial Cells Moderate /hpf      Bacteria, UA Moderate /hpf     UA w Reflex to Microscopic w Reflex to Culture [[id number]]  (Abnormal) Collected: 01/25/22 0921    Lab Status: Final result Specimen: Urine, Clean Catch Updated: 01/25/22 0936     Color, UA Yellow     Clarity, UA Slightly Cloudy     Specific Gravity, UA >=1 030     pH, UA 6 0     Leukocytes, UA Small     Nitrite, UA Negative     Protein, UA Negative mg/dl      Glucose, UA Negative mg/dl      Ketones, UA 15 (1+) mg/dl      Urobilinogen, UA 0 2 E U /dl      Bilirubin, UA Negative     Blood, UA Trace-Intact    CBC and differential [[id number]]  (Abnormal) Collected: 01/25/22 8192    Lab Status: Final result Specimen: Blood from Arm, Right Updated: 22     WBC 9 19 Thousand/uL      RBC 5 57 Million/uL      Hemoglobin 14 5 g/dL      Hematocrit 43 8 %      MCV 79 fL      MCH 26 0 pg      MCHC 33 1 g/dL      RDW 14 5 %      MPV 11 0 fL      Platelets 962 Thousands/uL      nRBC 0 /100 WBCs      Neutrophils Relative 62 %      Immat GRANS % 0 %      Lymphocytes Relative 27 %      Monocytes Relative 7 %      Eosinophils Relative 3 %      Basophils Relative 1 %      Neutrophils Absolute 5 66 Thousands/µL      Immature Grans Absolute 0 03 Thousand/uL      Lymphocytes Absolute 2 50 Thousands/µL      Monocytes Absolute 0 64 Thousand/µL      Eosinophils Absolute 0 30 Thousand/µL      Basophils Absolute 0 06 Thousands/µL     POCT pregnancy, urine [[id number]]  (Normal) Resulted: 22    Lab Status: Final result Updated: 22     EXT PREG TEST UR (Ref: Negative) Positive     Control Valid          CT abdomen pelvis with contrast   Final Result by Carmen Waller MD ( 1449)      Enlarged uterus with thickened, heterogeneous uterine cavity  Retained products of conception should be considered  3 1 cm left ovarian cyst             Workstation performed: [id number]         US OB < 14 weeks with transvaginal   Final Result by Nadiya Plaza MD ( 1303)      No intrauterine gestation or adnexal mass identified  There are collections of simple fluid in the endometrial cavity within the fundus larger on the left than the right  Although spontaneous  is favored, differential still includes early    intrauterine pregnancy and ectopic pregnancy  Correlation with serial quantitative beta hCG is recommended        Workstation performed: [id number]             ED COURSE & MEDICAL DECISION MAKING  Procedures             Medications   sodium chloride 0 9 % bolus 1,000 mL (0 mL Intravenous Stopped 22 1445)   ondansetron (ZOFRAN) injection 4 mg (4 mg Intravenous Given 1/25/22 0922)   HYDROmorphone (DILAUDID) injection 0 5 mg (0 5 mg Intravenous Given 1/25/22 0944)   HYDROmorphone (DILAUDID) injection 0 5 mg (0 5 mg Intravenous Given 1/25/22 1129)   iohexol (OMNIPAQUE) 350 MG/ML injection (SINGLE-DOSE) 100 mL (100 mL Intravenous Given 1/25/22 1418)   HYDROmorphone (DILAUDID) injection 0 5 mg (0 5 mg Intravenous Given 1/25/22 1444)   cephalexin (KEFLEX) capsule 500 mg (500 mg Oral Given 1/25/22 133)     48-year-old female presenting with significant rate sided/right periumbilical abdominal pain  Vital signs reviewed, tachycardic, normotensive, within normal limits otherwise  Medical record reviewed, patient recently had 2 miscarriages in October and again in December  Differential diagnosis for patient's presentation includes acute appendicitis, colitis, diverticulitis, ruptured ovarian cyst, ovarian torsion, pregnancy including ectopic pregnancy, versus another etiology of symptoms  Patient does note that whenever she gets low back pain, it usually does suggest urinary tract infection for her even if she does not developed urinary burning/frequency  I am concerned by patient's tachycardia  With patient supine, heart rate was 106, however, when I set the patient up to auscultate her lungs, her heart rate shot up to 131  I am concerned at this time for either severe dehydration or possible intra-abdominal process such as intra-abdominal bleeding  Labs obtained, revealing unremarkable CMP, normal lactate, normal CBC with normal hemoglobin of 14 5, not significantly changed from hemoglobin obtained on December 15th  UA is not consistent with UTI, however, patient has positive urine pregnancy test   HCG obtained and is elevated at 317 8  Medical record is reviewed  After patient had her last miscarriage in December, she had hCG trend all the way down to less than 2 per [facility] records    Elevated hCG today is suggestive of a new pregnancy versus ectopic pregnancy  Although patient has no suprapubic abdominal pain and has more of right upper and right periumbilical abdominal pain, we will 1st pursue ultrasound of the pelvic organs to identify intrauterine pregnancy and to attempt to rule out ectopic pregnancy in the adnexa  Ultrasound was obtained and is as above, no intrauterine gestation or adnexal mass identified at this time  Patient is requiring multiple opioid analgesics  Given severity of pain and tachycardia, I am concerned that patient may have an acute intra-abdominal process such as an aberrant ectopic pregnancy, acute appendicitis, versus another potentially surgical process with high risk for deterioration and even death  I had an extensive decision making discussion with the patient including pursuing additional imaging such as CT of the abdomen  Following shared decision making during which we discussed the risks of radiation exposure in this patient with potential first-trimester pregnancy, including injury/damage to embryo including miscarriage, fetal abnormalities, etc   as well as risk of CT associated malignancy to the patient, as well as risk of missing acute intra-abdominal condition that could lead to significant morbidity or even mortality, patient would like to proceed with CT imaging of the abdomen which I feel is appropriate  CT abdomen/pelvis obtained, thankfully revealing no acute abdomen  Concerning patient's uterine findings, patient did have an ultrasound earlier today with findings as above  Retained products of conception are a consideration, as is first-trimester pregnancy, as well as miscarriage  At this time, we will obtain a repeat HCG in 36-48 hours and will refer the patient to [address] [place], [place] [address]  per patient request   On re-evaluation, patient reports that her symptoms are improved  She is concerned by her lower back pain and presence of bacteria in her urine    We will send urine for culture and start patient on a course of antibiotics for possible urinary tract infection, although technically presence of moderate epithelial cells suggests and improperly collected sample more so than a UTI  Patient discharged to home with recommendations for symptom control, return precautions, and plan for follow up  Patient is under strict return precautions  ED Course as of 01/27/22 0836   Tue Jan 25, 2022   1115 Discussed the Hcg with the patient       MDM    CLINICAL IMPRESSION  Final diagnoses:   RLQ abdominal pain   Low back pain   Elevated serum hCG   Urinary tract infection       DISPOSITION  Time reflects when diagnosis was documented in both MDM as applicable and the Disposition within this note     Time User Action Codes Description Comment    1/25/2022  3:22 PM Harmeet Sequin Add [R10 31] RLQ abdominal pain     1/25/2022  3:22 PM Harmeet Sequin Add [M54 50] Low back pain     1/25/2022  3:22 PM Harmeet Sequin Add [E34 9] Elevated serum hCG     1/25/2022  3:23 PM Harmeet Sequin Add [N39 0] Urinary tract infection       ED Disposition     ED Disposition Condition Date/Time Comment    Discharge Stable Tue Jan 25, 2022  3:21 PM Monique Chirinos discharge to home/self care              Follow-up Information     Follow up With Specialties Details Why [place] [address] Emergency Department Emergency Medicine Go to  As needed, If symptoms worsen [address] [phone]  [facility] Emergency Department, [address], [address]   Discharge Medication List as of 1/25/2022  3:26 PM      START taking these medications    Details   cephalexin (KEFLEX) 500 mg capsule Take 1 capsule (500 mg total) by mouth every 8 (eight) hours for 4 days, Starting Tue 1/25/2022, Until Sat 1/29/2022, Normal         CONTINUE these medications which have NOT CHANGED    Details   omeprazole (PriLOSEC) 20 mg delayed release capsule Take 20 mg by mouth daily, Historical Med              Robby Arrieta MD  01/27/22 8707

## 2022-01-26 LAB — BACTERIA UR CULT: NORMAL

## 2022-04-26 ENCOUNTER — OFFICE VISIT (OUTPATIENT)
Dept: FAMILY MEDICINE CLINIC | Facility: CLINIC | Age: 37
End: 2022-04-26
Payer: COMMERCIAL

## 2022-04-26 VITALS
TEMPERATURE: 97.3 F | HEART RATE: 78 BPM | WEIGHT: 200.2 LBS | BODY MASS INDEX: 33.36 KG/M2 | HEIGHT: 65 IN | OXYGEN SATURATION: 98 % | SYSTOLIC BLOOD PRESSURE: 102 MMHG | DIASTOLIC BLOOD PRESSURE: 70 MMHG

## 2022-04-26 DIAGNOSIS — R20.2 PARESTHESIA AND PAIN OF BOTH UPPER EXTREMITIES: Primary | ICD-10-CM

## 2022-04-26 DIAGNOSIS — R29.898 BILATERAL ARM WEAKNESS: ICD-10-CM

## 2022-04-26 DIAGNOSIS — M79.602 PARESTHESIA AND PAIN OF BOTH UPPER EXTREMITIES: Primary | ICD-10-CM

## 2022-04-26 DIAGNOSIS — M79.601 PARESTHESIA AND PAIN OF BOTH UPPER EXTREMITIES: Primary | ICD-10-CM

## 2022-04-26 PROBLEM — D57.3 SICKLE CELL TRAIT (HCC): Status: ACTIVE | Noted: 2017-05-15

## 2022-04-26 PROBLEM — K21.9 GASTROESOPHAGEAL REFLUX DISEASE WITHOUT ESOPHAGITIS: Status: ACTIVE | Noted: 2022-04-26

## 2022-04-26 PROBLEM — E28.2 PCOS (POLYCYSTIC OVARIAN SYNDROME): Status: ACTIVE | Noted: 2021-03-24

## 2022-04-26 PROCEDURE — 1036F TOBACCO NON-USER: CPT | Performed by: PHYSICIAN ASSISTANT

## 2022-04-26 PROCEDURE — 99204 OFFICE O/P NEW MOD 45 MIN: CPT | Performed by: PHYSICIAN ASSISTANT

## 2022-04-26 PROCEDURE — 3008F BODY MASS INDEX DOCD: CPT | Performed by: PHYSICIAN ASSISTANT

## 2022-04-26 PROCEDURE — 3725F SCREEN DEPRESSION PERFORMED: CPT | Performed by: PHYSICIAN ASSISTANT

## 2022-04-26 NOTE — PROGRESS NOTES
Assessment/Plan:    Problem List Items Addressed This Visit     None      Visit Diagnoses     Paresthesia and pain of both upper extremities    -  Primary    Relevant Orders    MRI cervical spine wo contrast    EMG 2 Limb Upper Extremity    Bilateral arm weakness        Relevant Orders    MRI cervical spine wo contrast    EMG 2 Limb Upper Extremity           Diagnoses and all orders for this visit:    Paresthesia and pain of both upper extremities  -     MRI cervical spine wo contrast; Future  -     EMG 2 Limb Upper Extremity; Future    Bilateral arm weakness  -     MRI cervical spine wo contrast; Future  -     EMG 2 Limb Upper Extremity; Future              Subjective:      Patient ID: Camilla Parada is a 39 y o  female  Osker Dine is here today to establish care with our office  Since Saturday (4/23/2022), pt woke with pain, weakness, numbness, tingling in bilateral arms from shoulders to hands  She struggles to grab mugs, has to use one hand to assist the other  Reports having some neck pain few months ago  Hands fall asleep, wake her at night  The following portions of the patient's history were reviewed and updated as appropriate:   She has a past medical history of Sickle cell trait ([facility] [address] )  ,  does not have any pertinent problems on file  ,   has a past surgical history that includes Cholecystectomy  ,  family history is not on file  ,   reports that she has never smoked  She has never used smokeless tobacco  She reports that she does not drink alcohol and does not use drugs  ,  is allergic to shellfish allergy - food allergy     Current Outpatient Medications   Medication Sig Dispense Refill    omeprazole (PriLOSEC) 20 mg delayed release capsule Take 20 mg by mouth daily       No current facility-administered medications for this visit  Review of Systems   Constitutional: Negative for activity change, appetite change, chills, diaphoresis, fatigue, fever and unexpected weight change     HENT: Negative for congestion, ear pain, postnasal drip, rhinorrhea, sinus pressure, sinus pain, sneezing, sore throat, tinnitus and voice change  Eyes: Negative for pain, redness and visual disturbance  Respiratory: Negative for cough, chest tightness, shortness of breath and wheezing  Cardiovascular: Negative for chest pain, palpitations and leg swelling  Gastrointestinal: Negative for abdominal pain, blood in stool, constipation, diarrhea, nausea and vomiting  Genitourinary: Negative for difficulty urinating, dysuria, frequency, hematuria and urgency  Musculoskeletal: Positive for neck pain  Negative for arthralgias, back pain, gait problem, joint swelling, myalgias and neck stiffness  Skin: Negative for color change, pallor, rash and wound  Neurological: Positive for weakness and numbness  Negative for dizziness, tremors, light-headedness and headaches  Psychiatric/Behavioral: Negative for dysphoric mood, self-injury, sleep disturbance and suicidal ideas  The patient is not nervous/anxious  Objective:  Vitals:    04/26/22 0851   BP: 102/70   Pulse: 78   Temp: (!) 97 3 °F (36 3 °C)   SpO2: 98%   Weight: 90 8 kg (200 lb 3 2 oz)   Height: 5' 5" (1 651 m)     Body mass index is 33 32 kg/m²  Physical Exam  Vitals reviewed  Constitutional:       General: She is not in acute distress  Appearance: She is well-developed  She is not diaphoretic  HENT:      Head: Normocephalic and atraumatic  Right Ear: Hearing, tympanic membrane, ear canal and external ear normal       Left Ear: Hearing, tympanic membrane, ear canal and external ear normal       Mouth/Throat:      Pharynx: Uvula midline  No oropharyngeal exudate  Eyes:      General: No scleral icterus  Right eye: No discharge  Left eye: No discharge  Conjunctiva/sclera: Conjunctivae normal    Neck:      Thyroid: No thyromegaly  Vascular: No carotid bruit     Cardiovascular:      Rate and Rhythm: Normal rate and regular rhythm  Heart sounds: Normal heart sounds  No murmur heard  Pulmonary:      Effort: Pulmonary effort is normal  No respiratory distress  Breath sounds: Normal breath sounds  No wheezing  Abdominal:      General: Bowel sounds are normal  There is no distension  Palpations: Abdomen is soft  There is no mass  Tenderness: There is no abdominal tenderness  There is no guarding or rebound  Musculoskeletal:         General: No tenderness  Normal range of motion  Cervical back: Neck supple  Lymphadenopathy:      Cervical: No cervical adenopathy  Skin:     General: Skin is warm and dry  Findings: No erythema or rash  Neurological:      Mental Status: She is alert and oriented to person, place, and time  Motor: Weakness (bilateral UE with weakness, L>R) present  Psychiatric:         Behavior: Behavior normal          Thought Content:  Thought content normal          Judgment: Judgment normal

## 2022-05-03 ENCOUNTER — HOSPITAL ENCOUNTER (OUTPATIENT)
Dept: MRI IMAGING | Facility: HOSPITAL | Age: 37
Discharge: HOME | End: 2022-05-03
Payer: COMMERCIAL

## 2022-05-03 DIAGNOSIS — M79.601 PARESTHESIA AND PAIN OF BOTH UPPER EXTREMITIES: ICD-10-CM

## 2022-05-03 DIAGNOSIS — R29.898 BILATERAL ARM WEAKNESS: ICD-10-CM

## 2022-05-03 DIAGNOSIS — M79.602 PARESTHESIA AND PAIN OF BOTH UPPER EXTREMITIES: ICD-10-CM

## 2022-05-03 DIAGNOSIS — R20.2 PARESTHESIA AND PAIN OF BOTH UPPER EXTREMITIES: ICD-10-CM

## 2022-05-03 PROCEDURE — 72141 MRI NECK SPINE W/O DYE: CPT

## 2022-05-03 PROCEDURE — G1004 CDSM NDSC: HCPCS

## 2022-05-04 DIAGNOSIS — M79.601 PARESTHESIA AND PAIN OF BOTH UPPER EXTREMITIES: Primary | ICD-10-CM

## 2022-05-04 DIAGNOSIS — R29.898 BILATERAL ARM WEAKNESS: ICD-10-CM

## 2022-05-04 DIAGNOSIS — R20.2 PARESTHESIA AND PAIN OF BOTH UPPER EXTREMITIES: Primary | ICD-10-CM

## 2022-05-04 DIAGNOSIS — M79.602 PARESTHESIA AND PAIN OF BOTH UPPER EXTREMITIES: Primary | ICD-10-CM

## 2022-05-04 DIAGNOSIS — R90.89 ABNORMAL MRI, SPINAL CORD: ICD-10-CM

## 2022-05-06 ENCOUNTER — TELEPHONE (OUTPATIENT)
Dept: FAMILY MEDICINE CLINIC | Facility: CLINIC | Age: 37
End: 2022-05-06

## 2022-05-06 NOTE — TELEPHONE ENCOUNTER
PT Name: Rodolfo Messina  MR #: [id number]  Physician Query Form - CKD Clarification     CDS/: Negra Melara RN, CCDS             Contact information: [email or web address]  This form is a permanent document in the medical record.     Query Date: May 25, 2018    By submitting this query, we are merely seeking further clarification of documentation. Please utilize your independent clinical judgment when addressing the question(s) below.    The Medical record contains the following:     Indicators   Supporting Clinical Findings   Location in Medical Record   X CKD or Chronic Kidney (Renal) Failure / Disease Chronic Renal Disease 5/24 anesthesia pre-op   X BUN/Creatinine                          GFR Cr 1.7-->1.4-->1.3  gfr 40.8->51.6-->56.5 5/23- 5/25 lab    Dehydration      Nausea / Vomiting      Dialysis / CRRT      Medication      Treatment     X Other Chronic Conditions CHF, htn, cad, bph 5/23 h/p   X Other Bun/Cr 34/1.7 (baseline Cr 1.4-1.6) 5/23 h/p     Provider, please further specify the stage of CKD.      [  ] Chronic Kidney Disease (CKD) (please specify stage* below)       [employer] Definitions  Stage Description  eGFR (mL/min)   [  ]     I Slight kidney damage with normal or increased filtration 90+   [  ]     II Mildly reduced kidney function 60-89   [ x ]     III Moderately reduced kidney function 30-59                 [  ] Other (please specify): ________________________  [  ] Clinically Undetermined    Please document in your progress notes daily for the duration of treatment until resolved and include in your discharge summary.                       Sanya Sutherland already have this in the system so not sure what they are looking for? [facility]      ----- Message from César Chaudhry sent at 5/6/2022  6:44 AM EDT -----  Regarding: FW: Neurosurgeon     ----- Message -----  From: Diana Benson  Sent: 5/5/2022   5:59 PM EDT  To: LATANYA VASQUEZVIRY [facility]  Subject: Neurosurgeon                                     Good afternoon [facility]     I was able to get an appointment with the neurosurgeon but the  informed me I needed a referral because of my insurance  Are you able to issue that referral? My appointment is for May 17,2022  Thank you and have a great afternoon

## 2022-05-10 ENCOUNTER — PROCEDURE VISIT (OUTPATIENT)
Dept: NEUROLOGY | Facility: CLINIC | Age: 37
End: 2022-05-10
Payer: COMMERCIAL

## 2022-05-10 DIAGNOSIS — R20.2 PARESTHESIA OF SKIN: ICD-10-CM

## 2022-05-10 PROCEDURE — 95911 NRV CNDJ TEST 9-10 STUDIES: CPT | Performed by: PHYSICAL MEDICINE & REHABILITATION

## 2022-05-10 PROCEDURE — 95886 MUSC TEST DONE W/N TEST COMP: CPT | Performed by: PHYSICAL MEDICINE & REHABILITATION

## 2022-05-10 NOTE — PROGRESS NOTES
EMG 2 Limb Upper Extremity     Date/Time 5/10/2022 11:26 AM     Performed by  Hamilton Gutierrez MD     Authorized by Naz Hager PA-C                [facility] AT [address]  [phone]    Electromyography & Nerve Conduction Studies Report          Full Name: Adriana Gonzales Gender: Female  MRN: [id number] YOB: 1985      Visit Date: 5/10/2022 10:54 AM  Age: 39 Years  Examining Physician: Hamilton Gutierrez MD   Referring Physician: Faye Prajapati PA-C    Medical History: 58-year-old right-handed female presents with complaints of sudden onset bilateral arm tingling, numbness, pain and weakness radiating from the shoulders to the hands  She also notes neck pain that started a few months ago  Patient is being evaluated for focal neuropathy versus cervical radiculopathy  TEMP 32      Sensory Nerve Conduction Study       Nerve / Sites Rec  Site Onset Lat Peak Lat  Amp Segments Distance Velocity     ms ms µV  cm m/s   R Median - Dig II (Antidromic)      Wrist Index 2 3 3 4 68 4 Wrist - Index 13 55      Ref  ?3 5 ? 20 0 Ref  ?50   L Median - Dig II (Antidromic)      Wrist Index 2 3 3 3 101 5 Wrist - Index 13 55      Ref  ?3 5 ? 20 0 Ref  ?50   R Ulnar - Dig V (Antidromic)      Wrist Dig V 2 0 3 0 41 2 Wrist - Dig V 11 54      Ref  ?3 1 ? 17 0 Ref  ?50   L Ulnar - Dig V (Antidromic)      Wrist Dig V 1 9 2 9 40 0 Wrist - Dig V 11 57      Ref  ?3 1 ? 17 0 Ref  ?50   R Radial - Superficial (Antidromic)      Forearm Wrist 1 5 2 1 48 1 Forearm - Wrist 10 66      Ref  ?2 9 ? 15 0 Ref  ?50   L Radial - Superficial (Antidromic)      Forearm Wrist 1 5 2 1 56 0 Forearm - Wrist 10 66      Ref  ?2 9 ? 15 0 Ref  ?50       Motor Nerve Conduction Study       Nerve / Sites Muscle Latency Ref  Amplitude Ref  Segments Distance Lat Diff Velocity Ref       ms ms mV mV  cm ms m/s m/s   R Median - APB      Wrist APB 3 4 ?4 4 13 6 ?4 0 Wrist - APB 7 Elbow APB 7 2  13 2  Elbow - Wrist 22 3 81 58 ?49   L Median - APB      Wrist APB 2 8 ?4 4 14 5 ?4 0 Wrist - APB 7         Elbow APB 6 5  14 1  Elbow - Wrist 22 3 65 60 ?49   R Ulnar - ADM      Wrist ADM 2 4 ?3 3 8 8 ?6 0 Wrist - ADM 7         B  Elbow ADM 5 7  8 0  B  Elbow - Wrist 21 3 31 63 ?49      A  Elbow ADM 7 1  7 9  A  Elbow - B  Elbow 10 1 48 68 ?49   L Ulnar - ADM      Wrist ADM 2 4 ?3 3 8 5 ?6 0 Wrist - ADM 7         B  Elbow ADM 5 6  8 4  B  Elbow - Wrist 21 3 25 65 ?49      A  Elbow ADM 7 1  8 1  A  Elbow - B  Elbow 10 1 48 68 ?49       F Waves       Nerve F Latency Ref  ms ms   R Median - APB 24 8 ? 31 0   R Ulnar - ADM 23 9 ?32 0   L Median - APB 24 8 ? 31 0   L Ulnar - ADM 23 9 ?32 0       EMG Summary Table     Spontaneous MUAP Recruitment   Muscle Nerve Roots IA Fib PSW Fasc H F  Dur  Amp PPP Config Pattern   L  First dorsal interosseous Ulnar C8-T1 NL None None None None NL NL None NL NL   L  Deltoid Axillary C5-C6 NL None None None None NL NL None NL NL   R  Deltoid Axillary C5-C6 NL None None None None NL NL None NL NL   R  First dorsal interosseous Ulnar C8-T1 NL None None None None NL NL None NL NL   L  Biceps brachii Musculocut  C5-C6 NL None None None None NL NL None NL NL   R  Biceps brachii Musculocut  C5-C6 NL None None None None Sl  Incr  Gr  Incr  Few NL Reduced   L  Triceps brachii Radial C6-C8 NL None None None None NL NL None NL NL   R  Triceps brachii Radial C6-C8 NL None None None None NL NL None NL NL   L  Pronator teres Median C6-C7 NL None None None None Gr  Incr  Gr  Incr  Many NL Reduced   R  Pronator teres Median C6-C7 NL None None None None NL NL None NL NL   L  Abductor pollicis brevis Median Z9-C0 NL None None None None NL NL None NL NL   R  Abductor pollicis brevis Median Q9-I8 NL None None None None NL NL None NL NL   L  Cervical paraspinals (mid)  - NL None None None None NL NL None NL NL   R   Cervical paraspinals (mid)  - NL None None None None NL NL None NL NL Summary      The left and right median and ulnar motor conduction velocities and compound muscle action potentials were normal with normal distal latencies across the wrists  The left and right median and ulnar sensory conduction velocity and sensory action potentials were also normal with normal distal latencies across the wrists  The left and right median and ulnar F wave latencies were within normal limits  Concentric needle EMG was performed on selected muscles of the bilateral upper extremities  Was no evidence of active denervation in any of the muscles tested  Moderate decreased recruitment of giant motor units was noted in the left pronator teres and biceps  Early recruited motor units appear normal with recruitment patterns being full or full for effort in the remaining muscles tested  Impression:      Abnormal study  There is electrophysiologic evidence of a:    1  Chronic C6 radiculopathy on the left as evidenced by the decreased recruitment and chronic denervation changes in the above-mentioned muscles  2   There is no evidence of a median or ulnar neuropathy bilaterally

## 2022-05-17 ENCOUNTER — CONSULT (OUTPATIENT)
Dept: NEUROSURGERY | Facility: CLINIC | Age: 37
End: 2022-05-17
Payer: COMMERCIAL

## 2022-05-17 VITALS
WEIGHT: 200 LBS | BODY MASS INDEX: 33.32 KG/M2 | TEMPERATURE: 98.2 F | SYSTOLIC BLOOD PRESSURE: 110 MMHG | HEIGHT: 65 IN | DIASTOLIC BLOOD PRESSURE: 74 MMHG | HEART RATE: 85 BPM | RESPIRATION RATE: 16 BRPM

## 2022-05-17 DIAGNOSIS — M79.601 PARESTHESIA AND PAIN OF BOTH UPPER EXTREMITIES: ICD-10-CM

## 2022-05-17 DIAGNOSIS — M79.602 PARESTHESIA AND PAIN OF BOTH UPPER EXTREMITIES: ICD-10-CM

## 2022-05-17 DIAGNOSIS — R20.2 PARESTHESIA AND PAIN OF BOTH UPPER EXTREMITIES: ICD-10-CM

## 2022-05-17 PROCEDURE — 99203 OFFICE O/P NEW LOW 30 MIN: CPT | Performed by: NEUROLOGICAL SURGERY

## 2022-05-17 PROCEDURE — 1036F TOBACCO NON-USER: CPT | Performed by: NEUROLOGICAL SURGERY

## 2022-05-17 PROCEDURE — 3008F BODY MASS INDEX DOCD: CPT | Performed by: NEUROLOGICAL SURGERY

## 2022-05-17 RX ORDER — GABAPENTIN 100 MG/1
100 CAPSULE ORAL
Qty: 30 CAPSULE | Refills: 3 | Status: SHIPPED | OUTPATIENT
Start: 2022-05-17

## 2022-05-17 RX ORDER — METHOCARBAMOL 750 MG/1
750 TABLET, FILM COATED ORAL EVERY 6 HOURS PRN
Qty: 30 TABLET | Refills: 3 | Status: SHIPPED | OUTPATIENT
Start: 2022-05-17

## 2022-05-17 NOTE — PROGRESS NOTES
Neurosurgery Office Note  Ganesh Curiel 39 y o  female MRN: [id number]      Assessment/Plan        Problem List Items Addressed This Visit    None     Visit Diagnoses     Paresthesia and pain of both upper extremities        Relevant Medications    methocarbamol (Robaxin-750) 750 mg tablet    gabapentin (Neurontin) 100 mg capsule    Other Relevant Orders    Ambulatory Referral to Physical Therapy    Ambulatory Referral to Pain Management            Discussion:  Patient is a 58-year-old female with h/o sickle cell trait who p/w several months of neck pain (Rolly Gordon feels stiff) and intermittent bilateral arm pain (left > right), diffusely throughout both arms, initially started only as paresthesias then delayed pain  No persistent numbness, weakness, gait imbalance, find motor dysfunction in bilateral hands, bowel/bladder changes, recent trauma, prior spinal surgery  Currently not prescribed any pain medications, takes Naproxen PRN only  Never evaluated by PT or Pain Medicine  Not taking AC  Non-smoker  MRI on 5/3/22 showed partial disc collapse and disc herniation at C5/6 a/w L>R foraminal stenosis without significant cord compression or cord signal changes  Neurologically intact on exam without long tract signs  At this time, given intact exam, I recommend initial conservative therapies per PT and Pain Medicine  I will also prescribe trials of Robaxin and Neurontin for her current symptoms  No acute neurosurgical intervention indicated  Return to clinic in 3 months to reassess symptoms after conservative measures  All questions and concerns were addressed during this visit  CHIEF COMPLAINT    Chief Complaint   Patient presents with    Consult    Neck Pain       HISTORY    History of Present Illness     39y o  year old female     HPI    See Discussion    REVIEW OF SYSTEMS    Review of Systems   Constitutional: Negative  HENT: Negative  Eyes: Negative      Respiratory: Negative  Cardiovascular: Negative  Gastrointestinal: Negative  Endocrine: Negative  Genitourinary: Negative  Musculoskeletal: Positive for gait problem, neck pain (for about 1 month-center neck pain into bi/arm into thumb and middle to pinky in left/right arm to thumb and pinky, left is worse) and neck stiffness  Meds; ibuprofen  no recent conserative managements  EMG 4/26 & 5/11  Neurology   pain 5/10  non smoker/blood thinners   Neurological: Positive for weakness (weakness in bi/arm-hands, left is worse), numbness (bi/arm and hands, more so in left) and headaches (starting in the occipital, at times left eye pressure)  Psychiatric/Behavioral: Negative  Meds/Allergies     Current Outpatient Medications   Medication Sig Dispense Refill    omeprazole (PriLOSEC) 20 mg delayed release capsule Take 20 mg by mouth daily       No current facility-administered medications for this visit  Allergies   Allergen Reactions    Shellfish Allergy - Food Allergy Hives       PAST HISTORY    Past Medical History:   Diagnosis Date    Sickle cell trait ([facility] [address] )        Past Surgical History:   Procedure Laterality Date    CHOLECYSTECTOMY         Social History     Tobacco Use    Smoking status: Never Smoker    Smokeless tobacco: Never Used   Vaping Use    Vaping Use: Never used   Substance Use Topics    Alcohol use: Never    Drug use: Never       No family history on file  The following portions of the patient's history were reviewed in this encounter and updated as appropriate: Past medical, surgical, family, and social history, as well as medications, allergies, and review of systems  EXAM    Vitals:Blood pressure 110/74, pulse 85, temperature 98 2 °F (36 8 °C), temperature source Tympanic, resp  rate 16, height 5' 5" (1 651 m), weight 90 7 kg (200 lb), unknown if currently breastfeeding  ,Body mass index is 33 28 kg/m²       Physical Exam  Constitutional:       Appearance: Normal appearance  HENT:      Head: Normocephalic and atraumatic  Eyes:      Extraocular Movements: Extraocular movements intact and EOM normal       Pupils: Pupils are equal, round, and reactive to light  Cardiovascular:      Rate and Rhythm: Normal rate and regular rhythm  Pulses: Normal pulses  Heart sounds: Normal heart sounds  Pulmonary:      Effort: Pulmonary effort is normal       Breath sounds: Normal breath sounds  Abdominal:      General: Abdomen is flat  Palpations: Abdomen is soft  Musculoskeletal:         General: Normal range of motion  Cervical back: Normal range of motion  Skin:     General: Skin is warm  Neurological:      General: No focal deficit present  Mental Status: She is alert and oriented to person, place, and time  Mental status is at baseline  Gait: Gait is intact  Deep Tendon Reflexes: Strength normal    Psychiatric:         Mood and Affect: Mood normal          Speech: Speech normal          Behavior: Behavior normal          Neurologic Exam     Mental Status   Oriented to person, place, and time  Attention: normal    Speech: speech is normal   Level of consciousness: alert    Cranial Nerves     CN II   Visual fields full to confrontation  Visual acuity: normal  Right visual field deficit: none  Left visual field deficit: none     CN III, IV, VI   Pupils are equal, round, and reactive to light  Extraocular motions are normal    CN III: no CN III palsy  CN VI: no CN VI palsy  Nystagmus: none   Diplopia: none  Ophthalmoparesis: none  Upgaze: normal  Downgaze: normal  Conjugate gaze: present    CN V   Facial sensation intact  Right facial sensation deficit: none  Left facial sensation deficit: none    CN VII   Facial expression full, symmetric     Right facial weakness: none  Left facial weakness: none    CN VIII   CN VIII normal      CN IX, X   CN IX normal    CN X normal    Palate: symmetric    CN XI   CN XI normal    Right sternocleidomastoid strength: normal  Left sternocleidomastoid strength: normal  Right trapezius strength: normal  Left trapezius strength: normal    CN XII   CN XII normal    Tongue deviation: none    Motor Exam   Muscle bulk: normal  Overall muscle tone: normal  Right arm pronator drift: absent  Left arm pronator drift: absent    Strength   Strength 5/5 throughout  Sensory Exam   Light touch normal      Gait, Coordination, and Reflexes     Gait  Gait: normal    Reflexes   Reflexes 2+ except as noted  MEDICAL DECISION MAKING    Imaging Studies:     MRI cervical spine wo contrast    Result Date: 5/4/2022  Narrative: MRI CERVICAL SPINE WITHOUT CONTRAST INDICATION: R20 2: Paresthesia of skin M79 601: Pain in right arm M79 602: Pain in left arm R29 898: Other symptoms and signs involving the musculoskeletal system  COMPARISON:  None  TECHNIQUE:  Sagittal T1, sagittal T2, sagittal inversion recovery, axial T2, axial  2D merge IMAGE QUALITY:  Diagnostic FINDINGS: ALIGNMENT:  Minor straightening of normal cervical lordosis without subluxation or fracture  MARROW SIGNAL:  Normal marrow signal is identified within the visualized bony structures  No discrete marrow lesion  CERVICAL AND VISUALIZED THORACIC CORD:  Normal signal within the visualized cord  PREVERTEBRAL AND PARASPINAL SOFT TISSUES:  Normal  VISUALIZED POSTERIOR FOSSA:  The visualized posterior fossa demonstrates no abnormal signal  CERVICAL DISC SPACES: C2-C3:  Normal  C3-C4:  Normal  C4-C5:  Normal  C5-C6:  Circumferential bulging the discs with anterior osteophytes  Disc osteophyte extends asymmetrically to the left, deforming and rotating the cord with high probability compression left C6 root as it emerges from the ventral cord surface  Correlate for left C6 radiculitis   C6-C7:  Normal  C7-T1:  Normal  UPPER THORACIC DISC SPACES:  Normal      Impression: Left posterolateral disc osteophyte C5-C6 resulting in rotation and minor flattening of the cord  Correlate for left C6 radiculitis  Workstation performed: [id number]     EMG 2 Limb Upper Extremity    Result Date: 5/10/2022  Narrative: Lakhwinder Salazar MD     5/10/2022 11:33 AM   EMG 2 Limb Upper Extremity  Date/Time 5/10/2022 11:26 AM  Performed by  Lakhwinder Salazar MD  Authorized by Susan Gonsales PA-C        I have personally reviewed pertinent films in [facility]    Neurosurgeon

## 2022-07-15 ENCOUNTER — OFFICE VISIT (OUTPATIENT)
Dept: URGENT CARE | Facility: MEDICAL CENTER | Age: 37
End: 2022-07-15
Payer: COMMERCIAL

## 2022-07-15 VITALS
HEART RATE: 77 BPM | WEIGHT: 190 LBS | HEIGHT: 65 IN | DIASTOLIC BLOOD PRESSURE: 60 MMHG | OXYGEN SATURATION: 100 % | BODY MASS INDEX: 31.65 KG/M2 | TEMPERATURE: 97.5 F | RESPIRATION RATE: 18 BRPM | SYSTOLIC BLOOD PRESSURE: 122 MMHG

## 2022-07-15 DIAGNOSIS — R39.9 UTI SYMPTOMS: Primary | ICD-10-CM

## 2022-07-15 LAB
SL AMB  POCT GLUCOSE, UA: ABNORMAL
SL AMB LEUKOCYTE ESTERASE,UA: ABNORMAL
SL AMB POCT BILIRUBIN,UA: ABNORMAL
SL AMB POCT BLOOD,UA: ABNORMAL
SL AMB POCT CLARITY,UA: CLEAR
SL AMB POCT COLOR,UA: YELLOW
SL AMB POCT KETONES,UA: ABNORMAL
SL AMB POCT NITRITE,UA: ABNORMAL
SL AMB POCT PH,UA: 6.5
SL AMB POCT SPECIFIC GRAVITY,UA: 1
SL AMB POCT URINE PROTEIN: ABNORMAL
SL AMB POCT UROBILINOGEN: 0.2

## 2022-07-15 PROCEDURE — 87077 CULTURE AEROBIC IDENTIFY: CPT

## 2022-07-15 PROCEDURE — 81002 URINALYSIS NONAUTO W/O SCOPE: CPT

## 2022-07-15 PROCEDURE — 99213 OFFICE O/P EST LOW 20 MIN: CPT

## 2022-07-15 PROCEDURE — 87086 URINE CULTURE/COLONY COUNT: CPT

## 2022-07-15 PROCEDURE — S9088 SERVICES PROVIDED IN URGENT: HCPCS

## 2022-07-15 PROCEDURE — 87147 CULTURE TYPE IMMUNOLOGIC: CPT

## 2022-07-15 NOTE — PROGRESS NOTES
[facility]        NAME: Lydia Parnell is a 44 y o  female  : 1985    MRN: [id number]  DATE: July 15, 2022  TIME: 2:16 PM    Assessment and Plan   UTI symptoms [R39 9]  1  UTI symptoms  POCT urine dip    Urine culture     Urinalysis was + for blood  Patient currently has her menstrual cycle  A urine culture was sent  Patient Instructions     Drink plenty of water  The urine culture will take 24-48 hours to result  Continue to monitor your symptoms  Follow up with PCP in 3-5 days  Proceed to  ER if symptoms worsen  Chief Complaint     Chief Complaint   Patient presents with    Possible UTI     Pressure and frequency upon urination x 3 days          History of Present Illness       Patient is a  presenting with urinary pressure  She currently has her menstrual cycle  Urinary Tract Infection   This is a new problem  The current episode started yesterday  The problem occurs intermittently  The problem has been unchanged  The patient is experiencing no pain  She is sexually active  There is no history of pyelonephritis  Associated symptoms include frequency  Pertinent negatives include no chills, discharge, flank pain, hematuria, nausea, urgency or vomiting  She has tried nothing for the symptoms  The treatment provided no relief  Review of Systems   Review of Systems   Constitutional: Negative for activity change, appetite change, chills, fatigue and fever  Respiratory: Negative for cough and shortness of breath  Cardiovascular: Negative for chest pain and palpitations  Gastrointestinal: Negative for abdominal distention, abdominal pain, diarrhea, nausea and vomiting  Genitourinary: Positive for frequency  Negative for flank pain, hematuria and urgency  Musculoskeletal: Negative for arthralgias and back pain  Neurological: Negative for dizziness, weakness and headaches  All other systems reviewed and are negative          Current Medications Current Outpatient Medications:     gabapentin (Neurontin) 100 mg capsule, Take 1 capsule (100 mg total) by mouth daily at bedtime, Disp: 30 capsule, Rfl: 3    methocarbamol (Robaxin-750) 750 mg tablet, Take 1 tablet (750 mg total) by mouth every 6 (six) hours as needed for muscle spasms (neck pain), Disp: 30 tablet, Rfl: 3    omeprazole (PriLOSEC) 20 mg delayed release capsule, Take 20 mg by mouth daily, Disp: , Rfl:     Current Allergies     Allergies as of 07/15/2022 - Reviewed 07/15/2022   Allergen Reaction Noted    Shellfish allergy - food allergy Hives 03/11/2021            The following portions of the patient's history were reviewed and updated as appropriate: allergies, current medications, past family history, past medical history, past social history, past surgical history and problem list      Past Medical History:   Diagnosis Date    Sickle cell trait ([facility] [address] )        Past Surgical History:   Procedure Laterality Date    CHOLECYSTECTOMY         No family history on file  Medications have been verified  Objective   /60   Pulse 77   Temp 97 5 °F (36 4 °C)   Resp 18   Ht 5' 5" (1 651 m)   Wt 86 2 kg (190 lb)   SpO2 100%   BMI 31 62 kg/m²        Physical Exam     Physical Exam  Vitals and nursing note reviewed  Constitutional:       General: She is not in acute distress  Appearance: Normal appearance  She is normal weight  She is not ill-appearing or toxic-appearing  HENT:      Right Ear: Tympanic membrane normal       Left Ear: Tympanic membrane normal       Mouth/Throat:      Pharynx: Oropharynx is clear  Cardiovascular:      Rate and Rhythm: Normal rate and regular rhythm  Pulses: Normal pulses  Heart sounds: Normal heart sounds  Pulmonary:      Effort: Pulmonary effort is normal       Breath sounds: Normal breath sounds  Abdominal:      General: There is no distension  Palpations: Abdomen is soft  Tenderness:  There is no abdominal tenderness  There is no right CVA tenderness or left CVA tenderness  Musculoskeletal:         General: Normal range of motion  Skin:     General: Skin is warm and dry  Capillary Refill: Capillary refill takes less than 2 seconds  Neurological:      General: No focal deficit present  Mental Status: She is alert and oriented to person, place, and time

## 2022-07-15 NOTE — PATIENT INSTRUCTIONS
Drink plenty of water  The urine culture will take 24-48 hours to result  Continue to monitor your symptoms

## 2022-07-18 ENCOUNTER — TELEPHONE (OUTPATIENT)
Dept: URGENT CARE | Facility: MEDICAL CENTER | Age: 37
End: 2022-07-18

## 2022-07-18 LAB
BACTERIA UR CULT: ABNORMAL
BACTERIA UR CULT: ABNORMAL

## 2022-07-19 ENCOUNTER — TELEPHONE (OUTPATIENT)
Dept: URGENT CARE | Facility: MEDICAL CENTER | Age: 37
End: 2022-07-19

## 2022-07-19 DIAGNOSIS — N39.0 URINARY TRACT INFECTION WITHOUT HEMATURIA, SITE UNSPECIFIED: Primary | ICD-10-CM

## 2022-07-19 DIAGNOSIS — N39.0 URINARY TRACT INFECTION WITHOUT HEMATURIA, SITE UNSPECIFIED: ICD-10-CM

## 2022-07-19 RX ORDER — FLUCONAZOLE 150 MG/1
150 TABLET ORAL ONCE
Qty: 1 TABLET | Refills: 0 | Status: SHIPPED | OUTPATIENT
Start: 2022-07-19 | End: 2022-07-19

## 2022-07-19 RX ORDER — CEPHALEXIN 500 MG/1
500 CAPSULE ORAL EVERY 8 HOURS SCHEDULED
Qty: 21 CAPSULE | Refills: 0 | Status: SHIPPED | OUTPATIENT
Start: 2022-07-19 | End: 2022-07-26

## 2022-07-19 RX ORDER — CEPHALEXIN 500 MG/1
500 CAPSULE ORAL EVERY 8 HOURS SCHEDULED
Qty: 21 CAPSULE | Refills: 0 | Status: SHIPPED | OUTPATIENT
Start: 2022-07-19 | End: 2022-07-19 | Stop reason: SDUPTHER

## 2022-07-19 RX ORDER — FLUCONAZOLE 150 MG/1
150 TABLET ORAL ONCE
Qty: 1 TABLET | Refills: 0 | Status: SHIPPED | OUTPATIENT
Start: 2022-07-19 | End: 2022-07-19 | Stop reason: SDUPTHER

## 2022-07-19 NOTE — TELEPHONE ENCOUNTER
Patient called in regard to a message left in the previous day  Urine culture reveals of beta-hemolytic strep and other mixed contaminants  Comes continue  Keflex and Diflucan sent to her pharmacy in [facility]

## 2022-07-27 ENCOUNTER — HOSPITAL ENCOUNTER (EMERGENCY)
Facility: HOSPITAL | Age: 37
Discharge: HOME | End: 2022-07-27
Attending: EMERGENCY MEDICINE
Payer: COMMERCIAL

## 2022-07-27 VITALS
DIASTOLIC BLOOD PRESSURE: 98 MMHG | RESPIRATION RATE: 15 BRPM | TEMPERATURE: 96.8 F | HEART RATE: 76 BPM | BODY MASS INDEX: 31.65 KG/M2 | HEIGHT: 65 IN | SYSTOLIC BLOOD PRESSURE: 126 MMHG | WEIGHT: 190 LBS | OXYGEN SATURATION: 97 %

## 2022-07-27 DIAGNOSIS — N39.0 RECURRENT URINARY TRACT INFECTION: Primary | ICD-10-CM

## 2022-07-27 LAB
BACTERIA UR QL AUTO: ABNORMAL /HPF
BILIRUB UR QL STRIP: NEGATIVE
CLARITY UR: ABNORMAL
COLOR UR: YELLOW
EXT PREG TEST URINE: NEGATIVE
EXT. CONTROL ED NAV: NORMAL
GLUCOSE UR STRIP-MCNC: NEGATIVE MG/DL
HGB UR QL STRIP.AUTO: NEGATIVE
KETONES UR STRIP-MCNC: NEGATIVE MG/DL
LEUKOCYTE ESTERASE UR QL STRIP: ABNORMAL
NITRITE UR QL STRIP: NEGATIVE
NON-SQ EPI CELLS URNS QL MICRO: ABNORMAL /HPF
PH UR STRIP.AUTO: 6 [PH]
PROT UR STRIP-MCNC: NEGATIVE MG/DL
RBC #/AREA URNS AUTO: ABNORMAL /HPF
SP GR UR STRIP.AUTO: 1.02 (ref 1–1.03)
UROBILINOGEN UR QL STRIP.AUTO: 0.2 E.U./DL
WBC #/AREA URNS AUTO: ABNORMAL /HPF

## 2022-07-27 PROCEDURE — 99283 EMERGENCY DEPT VISIT LOW MDM: CPT

## 2022-07-27 PROCEDURE — 87086 URINE CULTURE/COLONY COUNT: CPT | Performed by: EMERGENCY MEDICINE

## 2022-07-27 PROCEDURE — 81025 URINE PREGNANCY TEST: CPT | Performed by: EMERGENCY MEDICINE

## 2022-07-27 PROCEDURE — 99284 EMERGENCY DEPT VISIT MOD MDM: CPT | Performed by: EMERGENCY MEDICINE

## 2022-07-27 PROCEDURE — 81001 URINALYSIS AUTO W/SCOPE: CPT | Performed by: EMERGENCY MEDICINE

## 2022-07-27 RX ORDER — FLUCONAZOLE 150 MG/1
150 TABLET ORAL ONCE
Qty: 2 TABLET | Refills: 0 | Status: SHIPPED | OUTPATIENT
Start: 2022-07-27 | End: 2022-07-27

## 2022-07-27 RX ORDER — NAPROXEN 250 MG/1
375 TABLET ORAL ONCE
Status: COMPLETED | OUTPATIENT
Start: 2022-07-27 | End: 2022-07-27

## 2022-07-27 RX ORDER — CIPROFLOXACIN 500 MG/1
500 TABLET, FILM COATED ORAL ONCE
Status: COMPLETED | OUTPATIENT
Start: 2022-07-27 | End: 2022-07-27

## 2022-07-27 RX ORDER — CIPROFLOXACIN 500 MG/1
500 TABLET, FILM COATED ORAL 2 TIMES DAILY
Qty: 18 TABLET | Refills: 0 | Status: SHIPPED | OUTPATIENT
Start: 2022-07-27 | End: 2022-08-05

## 2022-07-27 RX ADMIN — NAPROXEN 375 MG: 250 TABLET ORAL at 14:39

## 2022-07-27 RX ADMIN — CIPROFLOXACIN HYDROCHLORIDE 500 MG: 500 TABLET, FILM COATED ORAL at 14:55

## 2022-07-27 NOTE — DISCHARGE INSTRUCTIONS
You have been prescribed an antibiotic to treat the urinary infection  Please take as prescribed  Please call any of the urologists listed below for further evaluation  If you develop fever, shaking chills, vomiting, or passing out, please go to the ER

## 2022-07-27 NOTE — ED PROVIDER NOTES
History  Chief Complaint   Patient presents with    Possible UTI     Patient states "I think I still have a uti" patient recently finished antibiotic  She reports urinary frequency and abdominal discomfort     This is a 45 y/o woman presenting to the ED with persistent sx concerning for UTI after 7d treatment with cephalexin starting on 19 July 2022  Has persistent feeling of pressure in the pelvis and epigastric abd pain, which she states also occurs when she has UTI  The pelvic pressure did not improve at any point while taking the abx, although the epigastric pain did improve transiently but then worsened  She has never required hospitalization for UTI  Has never had an abx treatment failure with previous UTIs  Has had many prior UTIs; she did see a urologist some years ago while living in [place] d/t recurrent UTI, but she was told no obvious cause for recurrent infections could be identified  She has been taking OTC phenazopyridine in addition to antibiotic  Urine culture from 15 July urgent care visit demonstrated Beta Hemolytic GBS (which has intrinsic penicillin susceptibility), which she also had in urine culture from Dec 2021  Cholecystectomy approx 3 yr prior  No other GI/ surgery  No urinary catheter in past 30d  No fever/chills/n/v/lightheadedness/syncope/diaphoresis/diarrhea  A/p: UA today is again c/w infection despite recent abx therapy  She is not systemically ill  Outpatient therapy is reasonable  Ciprofloxacin b i d  Times 10 days  Patient requested Diflucan prescription as she has had vaginal candidiasis in the setting of antibiotic therapy previously  ED return recommended for any signs/symptoms of systemic illness  She will be referred to Urology for further evaluation  All questions were answered to patient's satisfaction prior to discharge  She expressed understanding and agreed to plan        History provided by:  Medical records and patient      Prior to Admission Medications Prescriptions Last Dose Informant Patient Reported? Taking? cephalexin (KEFLEX) 500 mg capsule   No No   Sig: Take 1 capsule (500 mg total) by mouth every 8 (eight) hours for 7 days   gabapentin (Neurontin) 100 mg capsule   No No   Sig: Take 1 capsule (100 mg total) by mouth daily at bedtime   methocarbamol (Robaxin-750) 750 mg tablet   No No   Sig: Take 1 tablet (750 mg total) by mouth every 6 (six) hours as needed for muscle spasms (neck pain)   omeprazole (PriLOSEC) 20 mg delayed release capsule   Yes No   Sig: Take 20 mg by mouth daily      Facility-Administered Medications: None       Past Medical History:   Diagnosis Date    Sickle cell trait ([facility] [address] )        Past Surgical History:   Procedure Laterality Date    CHOLECYSTECTOMY         History reviewed  No pertinent family history  I have reviewed and agree with the history as documented  E-Cigarette/Vaping    E-Cigarette Use Never User      E-Cigarette/Vaping Substances     Social History     Tobacco Use    Smoking status: Never Smoker    Smokeless tobacco: Never Used   Vaping Use    Vaping Use: Never used   Substance Use Topics    Alcohol use: Never    Drug use: Never       Review of Systems   Constitutional: Negative for chills and fever  Respiratory: Negative for cough and shortness of breath  Cardiovascular: Negative for chest pain and palpitations  Gastrointestinal: Positive for abdominal pain  Negative for diarrhea, nausea and vomiting  Genitourinary: Positive for flank pain and frequency  Negative for difficulty urinating, dysuria and urgency  Neurological: Negative for dizziness, weakness, numbness and headaches  All other systems reviewed and are negative  Physical Exam  Physical Exam  Vitals and nursing note reviewed  Constitutional:       General: She is awake  She is not in acute distress  Appearance: Normal appearance  She is well-developed  HENT:      Head: Normocephalic and atraumatic        Right Ear: Hearing and external ear normal       Left Ear: Hearing and external ear normal    Neck:      Trachea: Trachea and phonation normal    Cardiovascular:      Rate and Rhythm: Normal rate and regular rhythm  Pulses:           Radial pulses are 2+ on the right side and 2+ on the left side  Dorsalis pedis pulses are 2+ on the right side and 2+ on the left side  Posterior tibial pulses are 2+ on the right side and 2+ on the left side  Heart sounds: Normal heart sounds, S1 normal and S2 normal  No murmur heard  No friction rub  No gallop  Pulmonary:      Effort: Pulmonary effort is normal  No respiratory distress  Breath sounds: Normal breath sounds  No stridor  No decreased breath sounds, wheezing, rhonchi or rales  Abdominal:      Tenderness: There is abdominal tenderness in the suprapubic area  There is no right CVA tenderness, left CVA tenderness, guarding or rebound  Skin:     General: Skin is warm and dry  Neurological:      Mental Status: She is alert and oriented to person, place, and time  GCS: GCS eye subscore is 4  GCS verbal subscore is 5  GCS motor subscore is 6  Cranial Nerves: No cranial nerve deficit  Sensory: No sensory deficit  Motor: No abnormal muscle tone  Comments: PERRLA; EOMI  Sensation intact to light touch over face in V1-V3 distribution bilaterally  Facial expressions symmetric  Tongue/uvula midline  Shoulder shrug equal bilaterally  Strength 5/5 in UE/LE bilaterally  Sensation intact to light touch in UE/LE bilaterally           Vital Signs  ED Triage Vitals [07/27/22 1401]   Temperature Pulse Respirations Blood Pressure SpO2   (!) 96 8 °F (36 °C) 76 15 126/98 97 %      Temp Source Heart Rate Source Patient Position - Orthostatic VS BP Location FiO2 (%)   Temporal Monitor Sitting Right arm --      Pain Score       8           Vitals:    07/27/22 1401   BP: 126/98   Pulse: 76   Patient Position - Orthostatic VS: Sitting Visual Acuity      ED Medications  Medications   naproxen (NAPROSYN) tablet 375 mg (375 mg Oral Given 7/27/22 1439)   ciprofloxacin (CIPRO) tablet 500 mg (500 mg Oral Given 7/27/22 1455)       Diagnostic Studies  Results Reviewed     Procedure Component Value Units Date/Time    Urine Microscopic [[id number]]  (Abnormal) Collected: 07/27/22 1418    Lab Status: Final result Specimen: Urine, Clean Catch Updated: 07/27/22 1438     RBC, UA 1-2 /hpf      WBC, UA 10-20 /hpf      Epithelial Cells Moderate /hpf      Bacteria, UA Occasional /hpf     Urine culture [[id number]] Collected: 07/27/22 1418    Lab Status:  In process Specimen: Urine, Clean Catch Updated: 07/27/22 1438    UA w Reflex to Microscopic w Reflex to Culture [[id number]]  (Abnormal) Collected: 07/27/22 1418    Lab Status: Final result Specimen: Urine, Clean Catch Updated: 07/27/22 1426     Color, UA Yellow     Clarity, UA Slightly Cloudy     Specific Gravity, UA 1 025     pH, UA 6 0     Leukocytes, UA Moderate     Nitrite, UA Negative     Protein, UA Negative mg/dl      Glucose, UA Negative mg/dl      Ketones, UA Negative mg/dl      Urobilinogen, UA 0 2 E U /dl      Bilirubin, UA Negative     Occult Blood, UA Negative    POCT pregnancy, urine [[id number]]  (Normal) Resulted: 07/27/22 1424    Lab Status: Final result Updated: 07/27/22 1424     EXT PREG TEST UR (Ref: Negative) negative     Control valid                 No orders to display              Procedures  Procedures         ED Course  ED Course as of 07/27/22 1554   Wed Jul 27, 2022   1430 POCT pregnancy, urine  Negative   1430 UA w Reflex to Microscopic w Reflex to Culture(!)  Concentrated sample with moderate leukocytes  Consistent with continued infection, which has persisted despite abx therapy         MDM    Disposition  Final diagnoses:   Recurrent urinary tract infection     Time reflects when diagnosis was documented in both MDM as applicable and the Disposition within this note     Time User Action Codes Description Comment    7/27/2022  2:45 PM Elpidio Avila Add [N39 0] Recurrent urinary tract infection       ED Disposition     ED Disposition   Discharge    Condition   Stable    Date/Time   Wed Jul 27, 2022  2:44 PM    Comment   Derik Client discharge to home/self care                 Follow-up Information     Follow up With Specialties Details Why Contact Info Additional [address] [facility]   [address] [address] [phone] [facility], [facility], [address] [place] [address], [address] [facility]   [address] [address]  8  [facility]rology [facility], [address] [facility], [place], [place], [address] [facility] For Urology [facility]   [facility] [address]  [place] [address]  [address] [place] [address] For Urology [facility], [address] [place], [place], [address] [phone]          Discharge Medication List as of 7/27/2022  2:47 PM      START taking these medications    Details   ciprofloxacin (CIPRO) 500 mg tablet Take 1 tablet (500 mg total) by mouth 2 (two) times a day for 9 days, Starting Wed 7/27/2022, Until Fri 8/5/2022, Normal         CONTINUE these medications which have NOT CHANGED    Details   gabapentin (Neurontin) 100 mg capsule Take 1 capsule (100 mg total) by mouth daily at bedtime, Starting Tue 5/17/2022, Normal      methocarbamol (Robaxin-750) 750 mg tablet Take 1 tablet (750 mg total) by mouth every 6 (six) hours as needed for muscle spasms (neck pain), Starting Tue 5/17/2022, Normal      omeprazole (PriLOSEC) 20 mg delayed release capsule Take 20 mg by mouth daily, Historical Med         STOP taking these medications       cephalexin (KEFLEX) 500 mg capsule Comments:   Reason for Stopping:                   PDMP Review       Value Time User    PDMP Reviewed  Yes 10/13/2021  3:14 PM Jordyn Monae PA-C          ED Provider  Electronically Signed by           Shane Muñoz DO  07/27/22 6266

## 2022-07-29 LAB — BACTERIA UR CULT: NORMAL

## 2022-08-10 ENCOUNTER — TELEPHONE (OUTPATIENT)
Dept: NEUROSURGERY | Facility: CLINIC | Age: 37
End: 2022-08-10

## 2022-08-10 NOTE — TELEPHONE ENCOUNTER
Called patient and lmom to discuss upcoming follow up radha 8/26- I see patient has not tried PT/PM as per Dr Jalen Acevedo recommendations

## 2022-08-12 ENCOUNTER — APPOINTMENT (EMERGENCY)
Dept: CT IMAGING | Facility: HOSPITAL | Age: 37
End: 2022-08-12
Payer: COMMERCIAL

## 2022-08-12 ENCOUNTER — HOSPITAL ENCOUNTER (EMERGENCY)
Facility: HOSPITAL | Age: 37
Discharge: HOME | End: 2022-08-12
Attending: EMERGENCY MEDICINE | Admitting: EMERGENCY MEDICINE
Payer: COMMERCIAL

## 2022-08-12 VITALS
HEART RATE: 99 BPM | WEIGHT: 190 LBS | DIASTOLIC BLOOD PRESSURE: 81 MMHG | RESPIRATION RATE: 16 BRPM | SYSTOLIC BLOOD PRESSURE: 129 MMHG | HEIGHT: 65 IN | TEMPERATURE: 98.1 F | OXYGEN SATURATION: 97 % | BODY MASS INDEX: 31.65 KG/M2

## 2022-08-12 DIAGNOSIS — R11.2 NAUSEA AND VOMITING: Primary | ICD-10-CM

## 2022-08-12 DIAGNOSIS — R10.84 GENERALIZED ABDOMINAL PAIN: ICD-10-CM

## 2022-08-12 LAB
ALBUMIN SERPL BCP-MCNC: 4 G/DL (ref 3.5–5)
ALP SERPL-CCNC: 71 U/L (ref 46–116)
ALT SERPL W P-5'-P-CCNC: 37 U/L (ref 12–78)
ANION GAP SERPL CALCULATED.3IONS-SCNC: 8 MMOL/L (ref 4–13)
AST SERPL W P-5'-P-CCNC: 18 U/L (ref 5–45)
BACTERIA UR QL AUTO: NORMAL /HPF
BASOPHILS # BLD AUTO: 0.05 THOUSANDS/ΜL (ref 0–0.1)
BASOPHILS NFR BLD AUTO: 1 % (ref 0–1)
BILIRUB SERPL-MCNC: 0.41 MG/DL (ref 0.2–1)
BILIRUB UR QL STRIP: NEGATIVE
BUN SERPL-MCNC: 10 MG/DL (ref 5–25)
CALCIUM SERPL-MCNC: 9.2 MG/DL (ref 8.3–10.1)
CHLORIDE SERPL-SCNC: 104 MMOL/L (ref 96–108)
CLARITY UR: CLEAR
CO2 SERPL-SCNC: 28 MMOL/L (ref 21–32)
COLOR UR: YELLOW
CREAT SERPL-MCNC: 0.97 MG/DL (ref 0.6–1.3)
EOSINOPHIL # BLD AUTO: 0.16 THOUSAND/ΜL (ref 0–0.61)
EOSINOPHIL NFR BLD AUTO: 2 % (ref 0–6)
ERYTHROCYTE [DISTWIDTH] IN BLOOD BY AUTOMATED COUNT: 14.3 % (ref 11.6–15.1)
EXT PREG TEST URINE: NEGATIVE
EXT. CONTROL ED NAV: NORMAL
GFR SERPL CREATININE-BSD FRML MDRD: 74 ML/MIN/1.73SQ M
GLUCOSE SERPL-MCNC: 104 MG/DL (ref 65–140)
GLUCOSE UR STRIP-MCNC: NEGATIVE MG/DL
HCT VFR BLD AUTO: 42.5 % (ref 34.8–46.1)
HGB BLD-MCNC: 13.6 G/DL (ref 11.5–15.4)
HGB UR QL STRIP.AUTO: ABNORMAL
IMM GRANULOCYTES # BLD AUTO: 0.02 THOUSAND/UL (ref 0–0.2)
IMM GRANULOCYTES NFR BLD AUTO: 0 % (ref 0–2)
KETONES UR STRIP-MCNC: NEGATIVE MG/DL
LEUKOCYTE ESTERASE UR QL STRIP: NEGATIVE
LIPASE SERPL-CCNC: 60 U/L (ref 73–393)
LYMPHOCYTES # BLD AUTO: 1.43 THOUSANDS/ΜL (ref 0.6–4.47)
LYMPHOCYTES NFR BLD AUTO: 18 % (ref 14–44)
MCH RBC QN AUTO: 26 PG (ref 26.8–34.3)
MCHC RBC AUTO-ENTMCNC: 32 G/DL (ref 31.4–37.4)
MCV RBC AUTO: 81 FL (ref 82–98)
MONOCYTES # BLD AUTO: 0.52 THOUSAND/ΜL (ref 0.17–1.22)
MONOCYTES NFR BLD AUTO: 6 % (ref 4–12)
NEUTROPHILS # BLD AUTO: 5.9 THOUSANDS/ΜL (ref 1.85–7.62)
NEUTS SEG NFR BLD AUTO: 73 % (ref 43–75)
NITRITE UR QL STRIP: NEGATIVE
NON-SQ EPI CELLS URNS QL MICRO: NORMAL /HPF
NRBC BLD AUTO-RTO: 0 /100 WBCS
PH UR STRIP.AUTO: 6.5 [PH]
PLATELET # BLD AUTO: 409 THOUSANDS/UL (ref 149–390)
PMV BLD AUTO: 10.7 FL (ref 8.9–12.7)
POTASSIUM SERPL-SCNC: 4 MMOL/L (ref 3.5–5.3)
PROT SERPL-MCNC: 7.8 G/DL (ref 6.4–8.4)
PROT UR STRIP-MCNC: NEGATIVE MG/DL
RBC # BLD AUTO: 5.24 MILLION/UL (ref 3.81–5.12)
RBC #/AREA URNS AUTO: NORMAL /HPF
SODIUM SERPL-SCNC: 140 MMOL/L (ref 135–147)
SP GR UR STRIP.AUTO: 1.01 (ref 1–1.03)
UROBILINOGEN UR QL STRIP.AUTO: 1 E.U./DL
WBC # BLD AUTO: 8.08 THOUSAND/UL (ref 4.31–10.16)
WBC #/AREA URNS AUTO: NORMAL /HPF

## 2022-08-12 PROCEDURE — 96366 THER/PROPH/DIAG IV INF ADDON: CPT

## 2022-08-12 PROCEDURE — 81001 URINALYSIS AUTO W/SCOPE: CPT | Performed by: EMERGENCY MEDICINE

## 2022-08-12 PROCEDURE — 83690 ASSAY OF LIPASE: CPT | Performed by: EMERGENCY MEDICINE

## 2022-08-12 PROCEDURE — 96365 THER/PROPH/DIAG IV INF INIT: CPT

## 2022-08-12 PROCEDURE — 36415 COLL VENOUS BLD VENIPUNCTURE: CPT | Performed by: EMERGENCY MEDICINE

## 2022-08-12 PROCEDURE — 80053 COMPREHEN METABOLIC PANEL: CPT | Performed by: EMERGENCY MEDICINE

## 2022-08-12 PROCEDURE — 99284 EMERGENCY DEPT VISIT MOD MDM: CPT

## 2022-08-12 PROCEDURE — 81025 URINE PREGNANCY TEST: CPT | Performed by: EMERGENCY MEDICINE

## 2022-08-12 PROCEDURE — 96375 TX/PRO/DX INJ NEW DRUG ADDON: CPT

## 2022-08-12 PROCEDURE — 74176 CT ABD & PELVIS W/O CONTRAST: CPT

## 2022-08-12 PROCEDURE — 99284 EMERGENCY DEPT VISIT MOD MDM: CPT | Performed by: EMERGENCY MEDICINE

## 2022-08-12 PROCEDURE — G1004 CDSM NDSC: HCPCS

## 2022-08-12 PROCEDURE — 85025 COMPLETE CBC W/AUTO DIFF WBC: CPT | Performed by: EMERGENCY MEDICINE

## 2022-08-12 RX ORDER — DICYCLOMINE HCL 20 MG
20 TABLET ORAL ONCE
Status: COMPLETED | OUTPATIENT
Start: 2022-08-12 | End: 2022-08-12

## 2022-08-12 RX ORDER — ONDANSETRON 2 MG/ML
4 INJECTION INTRAMUSCULAR; INTRAVENOUS ONCE
Status: COMPLETED | OUTPATIENT
Start: 2022-08-12 | End: 2022-08-12

## 2022-08-12 RX ORDER — KETOROLAC TROMETHAMINE 30 MG/ML
15 INJECTION, SOLUTION INTRAMUSCULAR; INTRAVENOUS ONCE
Status: COMPLETED | OUTPATIENT
Start: 2022-08-12 | End: 2022-08-12

## 2022-08-12 RX ORDER — ONDANSETRON 4 MG/1
4 TABLET, ORALLY DISINTEGRATING ORAL ONCE
Status: COMPLETED | OUTPATIENT
Start: 2022-08-12 | End: 2022-08-12

## 2022-08-12 RX ORDER — ONDANSETRON 4 MG/1
4 TABLET, ORALLY DISINTEGRATING ORAL EVERY 6 HOURS PRN
Qty: 20 TABLET | Refills: 0 | Status: SHIPPED | OUTPATIENT
Start: 2022-08-12

## 2022-08-12 RX ORDER — DICYCLOMINE HCL 20 MG
20 TABLET ORAL 2 TIMES DAILY
Qty: 20 TABLET | Refills: 0 | Status: SHIPPED | OUTPATIENT
Start: 2022-08-12

## 2022-08-12 RX ADMIN — ONDANSETRON 4 MG: 4 TABLET, ORALLY DISINTEGRATING ORAL at 20:31

## 2022-08-12 RX ADMIN — KETOROLAC TROMETHAMINE 15 MG: 30 INJECTION, SOLUTION INTRAMUSCULAR at 18:21

## 2022-08-12 RX ADMIN — ONDANSETRON 4 MG: 2 INJECTION INTRAMUSCULAR; INTRAVENOUS at 18:21

## 2022-08-12 RX ADMIN — SODIUM CHLORIDE, POTASSIUM CHLORIDE, SODIUM LACTATE AND CALCIUM CHLORIDE 1000 ML: 600; 310; 30; 20 INJECTION, SOLUTION INTRAVENOUS at 18:21

## 2022-08-12 RX ADMIN — DICYCLOMINE HYDROCHLORIDE 20 MG: 20 TABLET ORAL at 20:31

## 2022-08-12 NOTE — ED PROVIDER NOTES
History  Chief Complaint   Patient presents with    Abdominal Pain     Patient reports generalized abdominal pain beginning Monday  She reports vomiting anytime she eats/drinks  Recently on antibiotic for UTI  Also reports diarrhea     HPI  79-year-old female presents to the emergency department for evaluation of generalized abdominal pain nausea vomiting poor p o  intake  Patient reports a past medical history significant for recurrent UTIs  She reports that on 07/15/2022 she was evaluated for symptoms which she reports her consistent with UTI including urinary frequency, pressure, indigestion/nausea when eating which are similar symptoms for previous episodes of UTI  She was found to have evidence of UTI was placed on a 7 day course of Keflex which she completed  She subsequently followed up in the emergency department the next day on 07/27/2022 and was found to have evidence of UTI again and was placed on Cipro times 10 days  She reports she had some improvement initially but about 4 days ago she had onset of intermittent generalized abdominal pain and worsening of the nausea vomiting and poor p o  intake since that time  She does not have new symptoms that were not present earlier  She reports that this progression of the previous symptoms  She does know loose stools which she has a bowel movement  She notes the pain comes in waves and is intense causing nausea  She does reports improvement her symptoms with bowel movements  Patient denies concern for STD  Denies a prior history of STI or PID  Is not concerned for pregnancy  Denies any vaginal bleeding pelvic pain  No prior abdominal surgical history other than cholecystectomy  Not currently on any medications  Prior to Admission Medications   Prescriptions Last Dose Informant Patient Reported?  Taking?   gabapentin (Neurontin) 100 mg capsule   No No   Sig: Take 1 capsule (100 mg total) by mouth daily at bedtime   methocarbamol (Robaxin-750) 750 mg tablet   No No   Sig: Take 1 tablet (750 mg total) by mouth every 6 (six) hours as needed for muscle spasms (neck pain)   omeprazole (PriLOSEC) 20 mg delayed release capsule   Yes No   Sig: Take 20 mg by mouth daily      Facility-Administered Medications: None       Past Medical History:   Diagnosis Date    [facility]        Past Surgical History:   Procedure Laterality Date    CHOLECYSTECTOMY         History reviewed  No pertinent family history  I have reviewed and agree with the history as documented  E-Cigarette/Vaping    E-Cigarette Use Never User      E-Cigarette/Vaping Substances     Social History     Tobacco Use    Smoking status: Never Smoker    Smokeless tobacco: Never Used   Vaping Use    Vaping Use: Never used   Substance Use Topics    Alcohol use: Never    Drug use: Never       Review of Systems   Constitutional: Negative for chills and fever  HENT: Negative for ear pain and sore throat  Eyes: Negative for pain and visual disturbance  Respiratory: Negative for cough and shortness of breath  Cardiovascular: Negative for chest pain and palpitations  Gastrointestinal: Positive for abdominal pain, nausea and vomiting  Genitourinary: Positive for frequency  Negative for decreased urine volume, difficulty urinating, dyspareunia, dysuria, flank pain, genital sores, hematuria, menstrual problem, pelvic pain, urgency, vaginal bleeding, vaginal discharge and vaginal pain  Musculoskeletal: Negative for arthralgias and back pain  Skin: Negative for color change and rash  Neurological: Negative for seizures and syncope  All other systems reviewed and are negative  Physical Exam  Physical Exam  Vitals and nursing note reviewed  Constitutional:       General: She is not in acute distress  Appearance: She is well-developed  HENT:      Head: Normocephalic and atraumatic        Mouth/Throat:      Mouth: Mucous membranes are moist       Pharynx: Oropharynx is clear  Eyes:      General: No scleral icterus  Conjunctiva/sclera: Conjunctivae normal    Cardiovascular:      Rate and Rhythm: Normal rate and regular rhythm  Heart sounds: No murmur heard  Pulmonary:      Effort: Pulmonary effort is normal  No respiratory distress  Breath sounds: Normal breath sounds  Abdominal:      General: Abdomen is flat  There is no distension  Palpations: Abdomen is soft  Tenderness: There is generalized abdominal tenderness  There is no right CVA tenderness, left CVA tenderness, guarding or rebound  Negative signs include Sawant's sign and McBurney's sign  Hernia: No hernia is present  Musculoskeletal:      Cervical back: Neck supple  Skin:     General: Skin is warm and dry  Neurological:      Mental Status: She is alert           Vital Signs  ED Triage Vitals [08/12/22 1743]   Temperature Pulse Respirations Blood Pressure SpO2   98 1 °F (36 7 °C) 99 16 129/81 97 %      Temp Source Heart Rate Source Patient Position - Orthostatic VS BP Location FiO2 (%)   Temporal -- Sitting Right arm --      Pain Score       5           Vitals:    08/12/22 1743   BP: 129/81   Pulse: 99   Patient Position - Orthostatic VS: Sitting         Visual Acuity      ED Medications  Medications   lactated ringers bolus 1,000 mL (1,000 mL Intravenous New Bag 8/12/22 1821)   ondansetron (ZOFRAN) injection 4 mg (4 mg Intravenous Given 8/12/22 1821)   ketorolac (TORADOL) injection 15 mg (15 mg Intravenous Given 8/12/22 1821)       Diagnostic Studies  Results Reviewed     Procedure Component Value Units Date/Time    Urine Microscopic [[id number]]  (Normal) Collected: 08/12/22 1820    Lab Status: Final result Specimen: Urine, Clean Catch Updated: 08/12/22 1906     RBC, UA 1-2 /hpf      WBC, UA 0-1 /hpf      Epithelial Cells Occasional /hpf      Bacteria, UA Occasional /hpf     Comprehensive metabolic panel [[id number]] Collected: 08/12/22 1820    Lab Status: Final result Specimen: Blood from Arm, Left Updated: 08/12/22 1857     Sodium 140 mmol/L      Potassium 4 0 mmol/L      Chloride 104 mmol/L      CO2 28 mmol/L      ANION GAP 8 mmol/L      BUN 10 mg/dL      Creatinine 0 97 mg/dL      Glucose 104 mg/dL      Calcium 9 2 mg/dL      AST 18 U/L      ALT 37 U/L      Alkaline Phosphatase 71 U/L      Total Protein 7 8 g/dL      Albumin 4 0 g/dL      Total Bilirubin 0 41 mg/dL      eGFR 74 ml/min/1 73sq m     Narrative:      National Kidney Disease Foundation guidelines for Chronic Kidney Disease (CKD):     Stage 1 with normal or high GFR (GFR > 90 mL/min/1 73 square meters)    Stage 2 Mild CKD (GFR = 60-89 mL/min/1 73 square meters)    Stage 3A Moderate CKD (GFR = 45-59 mL/min/1 73 square meters)    Stage 3B Moderate CKD (GFR = 30-44 mL/min/1 73 square meters)    Stage 4 Severe CKD (GFR = 15-29 mL/min/1 73 square meters)    Stage 5 End Stage CKD (GFR <15 mL/min/1 73 square meters)  Note: GFR calculation is accurate only with a steady state creatinine    Lipase [[id number]]  (Abnormal) Collected: 08/12/22 1820    Lab Status: Final result Specimen: Blood from Arm, Left Updated: 08/12/22 1857     Lipase 60 u/L     UA w Reflex to Microscopic w Reflex to Culture [[id number]]  (Abnormal) Collected: 08/12/22 1820    Lab Status: Final result Specimen: Urine, Clean Catch Updated: 08/12/22 1850     Color, UA Yellow     Clarity, UA Clear     Specific Gravity, UA 1 015     pH, UA 6 5     Leukocytes, UA Negative     Nitrite, UA Negative     Protein, UA Negative mg/dl      Glucose, UA Negative mg/dl      Ketones, UA Negative mg/dl      Urobilinogen, UA 1 0 E U /dl      Bilirubin, UA Negative     Occult Blood, UA Trace-Intact    CBC and differential [[id number]]  (Abnormal) Collected: 08/12/22 1820    Lab Status: Final result Specimen: Blood from Arm, Left Updated: 08/12/22 1835     WBC 8 08 Thousand/uL      RBC 5 24 Million/uL      Hemoglobin 13 6 g/dL      Hematocrit 42 5 %      MCV 81 fL      MCH 26 0 pg MCHC 32 0 g/dL      RDW 14 3 %      MPV 10 7 fL      Platelets 578 Thousands/uL      nRBC 0 /100 WBCs      Neutrophils Relative 73 %      Immat GRANS % 0 %      Lymphocytes Relative 18 %      Monocytes Relative 6 %      Eosinophils Relative 2 %      Basophils Relative 1 %      Neutrophils Absolute 5 90 Thousands/µL      Immature Grans Absolute 0 02 Thousand/uL      Lymphocytes Absolute 1 43 Thousands/µL      Monocytes Absolute 0 52 Thousand/µL      Eosinophils Absolute 0 16 Thousand/µL      Basophils Absolute 0 05 Thousands/µL     POCT pregnancy, urine [[id number]]  (Normal) Resulted: 08/12/22 1819    Lab Status: Final result Updated: 08/12/22 1820     EXT PREG TEST UR (Ref: Negative) Negative     Control Valid                 CT abdomen pelvis wo contrast   Final Result by Diana Pimentel MD (08/12 2011)      No evidence of acute intra-abdominal or pelvic process  Workstation performed: [id number]                    Procedures  Procedures         ED Course  ED Course as of 08/12/22 2016   Fri Aug 12, 2022   1909 Patient updated on her workup thus far  CT result is pending however the labs and the urine are reassuring no evidence of ongoing urine infection  The story and workup thus far would suggest that she probably has an antibiotic induced colitis as the cause of her current symptoms which as she has completed her antibiotics are best managed with supportive care I did discuss we will await the results the CT scan but unless significant findings we will proceed with outpatient management with antiemetics and pain control  Patient agreeable to that plan                                               MDM  Number of Diagnoses or Management Options  Generalized abdominal pain: new and requires workup  Nausea and vomiting: new and requires workup     Amount and/or Complexity of Data Reviewed  Clinical lab tests: ordered and reviewed  Tests in the radiology section of CPT®: ordered and reviewed  Tests in the medicine section of CPT®: reviewed and ordered  Decide to obtain previous medical records or to obtain history from someone other than the patient: yes  Review and summarize past medical records: yes  Independent visualization of images, tracings, or specimens: yes    Risk of Complications, Morbidity, and/or Mortality  Presenting problems: moderate  Diagnostic procedures: moderate  Management options: moderate    Patient Progress  Patient progress: stable  [de-identified] female presenting for generalized abdominal pain in setting of 2 full treatments for UTI symptoms with progression of symptoms  Given repeat visit lack improvement/worsening will proceed with larger workup including CT scan and blood work  Will treat symptoms with antiemetic, IV fluids, pain medication  Will check urine and urine pregnancy  Patient is not ill appearing with normal vital signs anticipate outpatient management  I am unsure if her symptoms are due to ongoing urine infection or if the patient is having antibiotic induced colitis or some other intra-abdominal process  Disposition  Final diagnoses:   Nausea and vomiting   Generalized abdominal pain     Time reflects when diagnosis was documented in both MDM as applicable and the Disposition within this note     Time User Action Codes Description Comment    8/12/2022  6:08 PM Red Sides Add [R11 2] Nausea and vomiting     8/12/2022  6:08 PM Red Sides Add [R10 84] Generalized abdominal pain       ED Disposition     ED Disposition   Discharge    Condition   Stable    Date/Time   Fri Aug 12, 2022  8:13 PM    [address] [facility] discharge to home/self care                 Follow-up Information     Follow up With Specialties Details Why Contact Info Additional Information    Nubia Oconnor PA-C Physician Assistant Schedule an appointment as soon as possible for a visit   [address] [facility] [address] [facility] [facility] [place] [facility] Schedule an appointment as soon as possible for a visit  As needed, If symptoms worsen [address] [address]  Santi Arron [place] [address] [facility] [address], [place], [address] [facility], [address] [phone]          Patient's Medications   Discharge Prescriptions    DICYCLOMINE (BENTYL) 20 MG TABLET    Take 1 tablet (20 mg total) by mouth 2 (two) times a day       Start Date: 8/12/2022 End Date: --       Order Dose: 20 mg       Quantity: 20 tablet    Refills: 0    ONDANSETRON (ZOFRAN ODT) 4 MG DISINTEGRATING TABLET    Take 1 tablet (4 mg total) by mouth every 6 (six) hours as needed for nausea or vomiting       Start Date: 8/12/2022 End Date: --       Order Dose: 4 mg       Quantity: 20 tablet    Refills: 0       No discharge procedures on file      PDMP Review       Value Time User    PDMP Reviewed  Yes 10/13/2021  3:14 PM Young Hunter PA-C          ED Provider  Electronically Signed by           Valeriy Estrada DO  08/12/22 2016

## 2022-08-12 NOTE — Clinical Note
Yimi Murray was seen and treated in our emergency department on 8/12/2022  Diagnosis:     [place]  may return to work on return date  She may return on this date: 08/13/2022         If you have any questions or concerns, please don't hesitate to call        Venita Heading, DO    ______________________________           _______________          _______________  Hospital Representative                              Date                                Time

## 2022-08-13 NOTE — DISCHARGE INSTRUCTIONS
Thank you for visiting the Emergency Department today  Labs, urine and CT scan normal  It appears the urine infection has resolved  In light of the current symptoms and the negative workup, I would suspect the antibiotics induced theses symptoms  They should improve with time now that you are off antibiotics  You may consider a probiotic, but the evidence is mixed  Prescriptions for nausea and abdominal discomfort provided  Use as needed  Contact GI if symptoms return in future or fail to improve  Return here for severe symptoms

## 2022-08-16 ENCOUNTER — TELEPHONE (OUTPATIENT)
Dept: NEUROSURGERY | Facility: CLINIC | Age: 37
End: 2022-08-16

## 2022-08-16 NOTE — TELEPHONE ENCOUNTER
lmom x 2 to discuss patients upcoming appt with LH-Patient has not tried PM/PT  Patient has read my previous mychart message

## 2022-08-22 ENCOUNTER — TELEPHONE (OUTPATIENT)
Dept: NEUROSURGERY | Facility: CLINIC | Age: 37
End: 2022-08-22

## 2022-08-22 NOTE — TELEPHONE ENCOUNTER
lmom x 2 on patients mobile number & spoke with Rodriguez (Significant Other) provided my direct line for patient to callback   Need to reschedule f/u till after patient sees PM/PT

## 2022-08-23 ENCOUNTER — TELEPHONE (OUTPATIENT)
Dept: NEUROSURGERY | Facility: CLINIC | Age: 37
End: 2022-08-23

## 2022-08-30 ENCOUNTER — TELEPHONE (OUTPATIENT)
Dept: NEUROSURGERY | Facility: CLINIC | Age: 37
End: 2022-08-30

## 2022-08-30 DIAGNOSIS — R20.2 PARESTHESIA AND PAIN OF BOTH UPPER EXTREMITIES: Primary | ICD-10-CM

## 2022-08-30 DIAGNOSIS — M79.602 PARESTHESIA AND PAIN OF BOTH UPPER EXTREMITIES: Primary | ICD-10-CM

## 2022-08-30 DIAGNOSIS — M79.601 PARESTHESIA AND PAIN OF BOTH UPPER EXTREMITIES: Primary | ICD-10-CM

## 2022-08-30 NOTE — TELEPHONE ENCOUNTER
Received a call from patient requesting for a new PT script stating that her current one is   Returned call to patient who stated the PT office stated that the last script was only good for 30 days  Advised patient this RN will place a new PT referral      She was appreciative of the call back

## 2022-08-31 ENCOUNTER — EVALUATION (OUTPATIENT)
Dept: PHYSICAL THERAPY | Facility: CLINIC | Age: 37
End: 2022-08-31
Payer: COMMERCIAL

## 2022-08-31 DIAGNOSIS — M79.602 PARESTHESIA AND PAIN OF BOTH UPPER EXTREMITIES: Primary | ICD-10-CM

## 2022-08-31 DIAGNOSIS — R20.2 PARESTHESIA AND PAIN OF BOTH UPPER EXTREMITIES: Primary | ICD-10-CM

## 2022-08-31 DIAGNOSIS — M79.601 PARESTHESIA AND PAIN OF BOTH UPPER EXTREMITIES: Primary | ICD-10-CM

## 2022-08-31 PROCEDURE — 97161 PT EVAL LOW COMPLEX 20 MIN: CPT

## 2022-08-31 PROCEDURE — 97530 THERAPEUTIC ACTIVITIES: CPT

## 2022-08-31 NOTE — PROGRESS NOTES
PT Evaluation     Today's date: 2022  Patient name: Alessandro Garcia  : 1985  MRN: [id number]  Referring provider: Santhosh Price MD  Dx:   Encounter Diagnosis     ICD-10-CM    1  Paresthesia and pain of both upper extremities  R20 2     M79 601     M79 602                   Assessment  Assessment details: Patient is a 44 y o  female who presents to PT with hx of c/s pain and radicular symptoms in the BUE L> R  Patient does demonstrate decreased strength of the LUE as compared to the right as well as decreased left  strength as compared to the right  Patient does demonstrate muscular hypertonicity of the c/s region  She did respond well to manual c/s traction  Patient is expected to respond well to PT intervention to decrease pain and radiculopathy  Impairments: pain with function  Understanding of Dx/Px/POC: excellent  Goals  STG- 4 weeks  Patient is able to demonstrate improved c/s rotation to the left to 64 degrees or greater  Patient to report pain at worst 3/10 with daily activity  Patient able to sleep with minimal disturbance due to neck pain  Patient to be independent with initial HEP  LTG - 8 weeks  Patient able to complete painfree ROM of the c/s  Patient able to demonstrate proper posture in sitting and standing  Patient able to demonstrate proper body mechanics with lifting and carrying items  Patient able to resume prior level of activity including household tasks such as cleaning without increased symptoms of paraesthesia in the BUE  Patient able to look up to place an item on a shelf without increased pain in the c/s  Patient to be independent with final HEP  Plan  Plan details: Patient's evaluation is completed  Patient was instructed with a HEP this date  Patient has scheduled further PT sessions for treatment     Patient would benefit from: skilled physical therapy  Planned modality interventions: ultrasound, unattended electrical stimulation and cryotherapy  Planned therapy interventions: manual therapy, neuromuscular re-education, therapeutic exercise, therapeutic training and home exercise program  Frequency: 2x week  Duration in weeks: 8  Plan of Care beginning date: 8/31/2022  Plan of Care expiration date: 10/28/2022        Subjective Evaluation    History of Present Illness  Mechanism of injury: Patient notes that she has had neck pain for a some time - a few months ago she stared with numbness through the night in both hands  She notes onset of severe pain in both arms began  She notes that she has had an EMG, CT scan  She has been started on meds by the neuro surgeon  She notes that she does take the meds when she feels at her worst but does not use them daily  She will see pain management  She notes that things have been falling out oher hands  She notes difficulty with gripping items, gripping to drive, and disturbed sleep  L hand symptoms > right hand symptoms  Patient is right hand dominant  C/S AROM is painful and limited  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  She does have 3 children  She notes difficulty doing housework  Pain  Quality: burning, dull ache, cramping, discomfort, knife-like, radiating and throbbing  Relieving factors: ice      Diagnostic Tests  MRI studies: abnormal  Patient Goals  Patient goals for therapy: decreased pain and increased strength          Objective     Concurrent Complaints  Positive for headaches  Postural Observations  Seated posture: good  Standing posture: good        Palpation   Left   Hypertonic in the cervical paraspinals, levator scapulae and upper trapezius  Tenderness of the lower trapezius, middle trapezius, scalenes, sternocleidomastoid, suboccipitals and upper trapezius  Right   Hypertonic in the cervical paraspinals, levator scapulae and upper trapezius     Tenderness of the lower trapezius, middle trapezius, scalenes, sternocleidomastoid, suboccipitals and upper trapezius  Neurological Testing     Sensation   Cervical/Thoracic   Left   Intact: light touch    Right   Intact: light touch    Active Range of Motion   Cervical/Thoracic Spine       Cervical    Flexion: 34 degrees  with pain  Extension: 34 degrees     with pain  Left lateral flexion: 28 degrees     with pain  Right lateral flexion: 32 degrees     with pain  Left rotation: 56 degrees with pain  Right rotation: 64 degrees    with pain    Strength/Myotome Testing   Cervical Spine   Neck extension: 4  Neck flexion: 4    Left Shoulder     Planes of Motion   Flexion: 4-   Extension: 4-   Abduction: 4-   Adduction: 4-   External rotation at 0°: 4-   Internal rotation at 0°: 4-     Right Shoulder     Planes of Motion   Flexion: 4+   Extension: 4+   Abduction: 4+   Adduction: 4+   External rotation at 0°: 4+   Internal rotation at 0°: 4+     Left Elbow   Flexion: 4-  Extension: 4-    Right Elbow   Flexion: 4+  Extension: 4+    Additional Strength Details   strength  Right  86/72/86  Left 24/34/36    Tests   Cervical   Positive cervical distraction test     Left   Positive cervical flexion-rotation test       Right   Negative cervical flexion-rotation test      Left Shoulder   Positive cervical rotation lateral flexion  Right Shoulder   Negative cervical rotation lateral flexion  Neuro Exam:     Headaches   Patient reports headaches: Yes                   Precautions - none       Manuals 8/31       C/s traction CD 5 min       Passive c/s stretching                         Neuro Re-Ed         C/s retraction Supine x5       scap retractions        JIM        C/S isometrics        Active neural glides                        Ther Ex        C/s AROM        B ER with TB        UTR        Self UT stretch                                        Ther Activity                        Gait Training                        Modalities        CP PRN        Postural and HEP education CD 10 mins                Access Code: [email or web address]  URL: [email or web address]  Date: 08/31/2022  Prepared by: Faye Valentine    Exercises  · Supine Cervical Sidebending - 1 x daily - 7 x weekly - 3 sets - 10 reps  · Supine Cervical Retraction with Towel - 1 x daily - 7 x weekly - 3 sets - 10 reps  · Seated Cervical Traction - 1 x daily - 7 x weekly - 3 sets - 10 reps

## 2022-08-31 NOTE — LETTER
2022    [address]  [address] [id number]    Patient: Jeannine Ferro   YOB: 1985   Date of Visit: 2022     Encounter Diagnosis     ICD-10-CM    1  Paresthesia and pain of both upper extremities  R20 2     M79 601     M79 602        Dear Dr Onel Cowan: Thank you for your recent referral of Jeannine Ferro  Please review the attached evaluation summary from Dorothea's recent visit  Please verify that you agree with the plan of care by signing the attached order  If you have any questions or concerns, please do not hesitate to call  I sincerely appreciate the opportunity to share in the care of one of your patients and hope to have another opportunity to work with you in the near future  Sincerely,    Rosalba Lynn, PT      Referring Provider:      I certify that I have read the below Plan of Care and certify the need for these services furnished under this plan of treatment while under my care  Sonya Leonard  [address] [facility] [address]  [place] [place]          PT Evaluation     Today's date: 2022  Patient name: Jeannine Ferro  : 1985  MRN: [id number]  Referring provider: Tiffany Sinha MD  Dx:   Encounter Diagnosis     ICD-10-CM    1  Paresthesia and pain of both upper extremities  R20 2     M79 601     M79 602                   Assessment  Assessment details: Patient is a 44 y o  female who presents to PT with hx of c/s pain and radicular symptoms in the BUE L> R  Patient does demonstrate decreased strength of the LUE as compared to the right as well as decreased left  strength as compared to the right  Patient does demonstrate muscular hypertonicity of the c/s region  She did respond well to manual c/s traction  Patient is expected to respond well to PT intervention to decrease pain and radiculopathy    Impairments: pain with function  Understanding of Dx/Px/POC: excellent  Goals  STG- 4 weeks  Patient is able to demonstrate improved c/s rotation to the left to 64 degrees or greater  Patient to report pain at worst 3/10 with daily activity  Patient able to sleep with minimal disturbance due to neck pain  Patient to be independent with initial HEP  LTG - 8 weeks  Patient able to complete painfree ROM of the c/s  Patient able to demonstrate proper posture in sitting and standing  Patient able to demonstrate proper body mechanics with lifting and carrying items  Patient able to resume prior level of activity including household tasks such as cleaning without increased symptoms of paraesthesia in the BUE  Patient able to look up to place an item on a shelf without increased pain in the c/s  Patient to be independent with final HEP  Plan  Plan details: Patient's evaluation is completed  Patient was instructed with a HEP this date  Patient has scheduled further PT sessions for treatment  Patient would benefit from: skilled physical therapy  Planned modality interventions: ultrasound, unattended electrical stimulation and cryotherapy  Planned therapy interventions: manual therapy, neuromuscular re-education, therapeutic exercise, therapeutic training and home exercise program  Frequency: 2x week  Duration in weeks: 8  Plan of Care beginning date: 8/31/2022  Plan of Care expiration date: 10/28/2022        Subjective Evaluation    History of Present Illness  Mechanism of injury: Patient notes that she has had neck pain for a some time - a few months ago she stared with numbness through the night in both hands  She notes onset of severe pain in both arms began  She notes that she has had an EMG, CT scan  She has been started on meds by the neuro surgeon  She notes that she does take the meds when she feels at her worst but does not use them daily  She will see pain management   She notes that things have been falling out oher hands  She notes difficulty with gripping items, gripping to drive, and disturbed sleep  L hand symptoms > right hand symptoms  Patient is right hand dominant  C/S AROM is painful and limited  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  She does have 3 children  She notes difficulty doing housework  Pain  Quality: burning, dull ache, cramping, discomfort, knife-like, radiating and throbbing  Relieving factors: ice      Diagnostic Tests  MRI studies: abnormal  Patient Goals  Patient goals for therapy: decreased pain and increased strength          Objective     Concurrent Complaints  Positive for headaches  Postural Observations  Seated posture: good  Standing posture: good        Palpation   Left   Hypertonic in the cervical paraspinals, levator scapulae and upper trapezius  Tenderness of the lower trapezius, middle trapezius, scalenes, sternocleidomastoid, suboccipitals and upper trapezius  Right   Hypertonic in the cervical paraspinals, levator scapulae and upper trapezius  Tenderness of the lower trapezius, middle trapezius, scalenes, sternocleidomastoid, suboccipitals and upper trapezius       Neurological Testing     Sensation   Cervical/Thoracic   Left   Intact: light touch    Right   Intact: light touch    Active Range of Motion   Cervical/Thoracic Spine       Cervical    Flexion: 34 degrees  with pain  Extension: 34 degrees     with pain  Left lateral flexion: 28 degrees     with pain  Right lateral flexion: 32 degrees     with pain  Left rotation: 56 degrees with pain  Right rotation: 64 degrees    with pain    Strength/Myotome Testing   Cervical Spine   Neck extension: 4  Neck flexion: 4    Left Shoulder     Planes of Motion   Flexion: 4-   Extension: 4-   Abduction: 4-   Adduction: 4-   External rotation at 0°: 4-   Internal rotation at 0°: 4-     Right Shoulder     Planes of Motion   Flexion: 4+   Extension: 4+   Abduction: 4+ Adduction: 4+   External rotation at 0°: 4+   Internal rotation at 0°: 4+     Left Elbow   Flexion: 4-  Extension: 4-    Right Elbow   Flexion: 4+  Extension: 4+    Additional Strength Details   strength  Right  86/72/86  Left 24/34/36    Tests   Cervical   Positive cervical distraction test     Left   Positive cervical flexion-rotation test       Right   Negative cervical flexion-rotation test      Left Shoulder   Positive cervical rotation lateral flexion  Right Shoulder   Negative cervical rotation lateral flexion  Neuro Exam:     Headaches   Patient reports headaches: Yes  Precautions - none       Manuals 8/31       C/s traction CD 5 min       Passive c/s stretching                         Neuro Re-Ed         C/s retraction Supine x5       scap retractions        JIM        C/S isometrics        Active neural glides                        Ther Ex        C/s AROM        B ER with TB        UTR        Self UT stretch                                        Ther Activity                        Gait Training                        Modalities        CP PRN        Postural and HEP education CD 10 mins                Access Code: [email or web address]  URL: [email or web address]  Date: 08/31/2022  Prepared by: Dorene Alpers    Exercises  · Supine Cervical Sidebending - 1 x daily - 7 x weekly - 3 sets - 10 reps  · Supine Cervical Retraction with Towel - 1 x daily - 7 x weekly - 3 sets - 10 reps  · Seated Cervical Traction - 1 x daily - 7 x weekly - 3 sets - 10 reps

## 2022-09-06 ENCOUNTER — OFFICE VISIT (OUTPATIENT)
Dept: PHYSICAL THERAPY | Facility: CLINIC | Age: 37
End: 2022-09-06
Payer: COMMERCIAL

## 2022-09-06 DIAGNOSIS — R20.2 PARESTHESIA AND PAIN OF BOTH UPPER EXTREMITIES: Primary | ICD-10-CM

## 2022-09-06 DIAGNOSIS — M79.602 PARESTHESIA AND PAIN OF BOTH UPPER EXTREMITIES: Primary | ICD-10-CM

## 2022-09-06 DIAGNOSIS — M79.601 PARESTHESIA AND PAIN OF BOTH UPPER EXTREMITIES: Primary | ICD-10-CM

## 2022-09-06 PROCEDURE — 97110 THERAPEUTIC EXERCISES: CPT

## 2022-09-06 PROCEDURE — 97112 NEUROMUSCULAR REEDUCATION: CPT

## 2022-09-06 PROCEDURE — 97140 MANUAL THERAPY 1/> REGIONS: CPT

## 2022-09-06 NOTE — PROGRESS NOTES
Daily Note     Today's date: 2022  Patient name: Michael Florez  : 1985  MRN: [id number]  Referring provider: Rosa Medeiros MD  Dx:   Encounter Diagnosis     ICD-10-CM    1  Paresthesia and pain of both upper extremities  R20 2     M79 601     M79 602        Start Time: 1530  Stop Time: 1620  Total time in clinic (min): 50 minutes    Subjective: Pt notes occasional B/L radicular sx   Cervical tension continues  Objective: See treatment diary below  PTA progressed program per PT POC  Discussion for body mechanics in the work vehicle including use of towel roll for lumbar support  She will add lateral side-bend stretch to her HEP  Assessment: Tolerated treatment well  Patient exhibited good technique with therapeutic exercises      Plan: Continue per plan of care           Precautions - none       Manuals       C/s traction CD 5 min ds      Passive c/s stretching   ds              Neuro Re-Ed         C/s retraction Supine x5 10x 3"      scap retractions  L3 2/10      JIM  L3 2/10      C/S isometrics        Active neural glides                        Ther Ex        C/s AROM  10x ea       B ER with TB  NV       UTR  NV      Self UT stretch  HEP      Corner pec stretch  5x 5"                              Ther Activity                Gait Training        Modalities        CP PRN        Postural and HEP education CD 10 mins HP 10m pre

## 2022-09-07 ENCOUNTER — OFFICE VISIT (OUTPATIENT)
Dept: PHYSICAL THERAPY | Facility: CLINIC | Age: 37
End: 2022-09-07
Payer: COMMERCIAL

## 2022-09-07 DIAGNOSIS — M79.602 PARESTHESIA AND PAIN OF BOTH UPPER EXTREMITIES: Primary | ICD-10-CM

## 2022-09-07 DIAGNOSIS — R20.2 PARESTHESIA AND PAIN OF BOTH UPPER EXTREMITIES: Primary | ICD-10-CM

## 2022-09-07 DIAGNOSIS — M79.601 PARESTHESIA AND PAIN OF BOTH UPPER EXTREMITIES: Primary | ICD-10-CM

## 2022-09-07 PROCEDURE — 97110 THERAPEUTIC EXERCISES: CPT

## 2022-09-07 PROCEDURE — 97140 MANUAL THERAPY 1/> REGIONS: CPT

## 2022-09-07 NOTE — PROGRESS NOTES
Daily Note     Today's date: 2022  Patient name: Torri Viveros  : 1985  MRN: [id number]  Referring provider: Fransisco Cho MD  Dx:   Encounter Diagnosis     ICD-10-CM    1  Paresthesia and pain of both upper extremities  R20 2     M79 601     M79 602                   Subjective: Patient notes increased pain symptoms 6/10 without use of meds  She notes pain in the left wrist/ thumb today but no tingling currently  Objective: See treatment diary below      Assessment: Tolerated treatment well  Patient would benefit from continued PT to progress c/s AROM while working to decrease neurological symptoms of the BUE  Patient does have muscular tenderness and hypertonicity of the upper trap and levator musculature  She did have nerve pain in the left hand with active neural glide and so a smaller range was completed to decrease irritation with the motion  Plan: Continue per plan of care           Precautions - none       Manuals      C/s traction CD 5 min ds CD 10 min     Passive c/s stretching   ds CD 5 min             Neuro Re-Ed         C/s retraction Supine x5 10x 3" Supine 10x  Seated 10x     scap retractions  L3 2/10 L3 2/10     JIM  L3 2/10 L3 2/10     C/S isometrics   ---->     Active neural glides   Median x5  Ulnar x5                     Ther Ex        C/s AROM  10x ea  10x ea     B ER with TB  NV  [place] x10     UTR  NV x10     Self UT stretch  HEP      Corner pec stretch  5x 5" 5x10"                             Ther Activity                Gait Training        Modalities        CP PRN        Postural and HEP education CD 10 mins HP 10m pre  HP 10 min post

## 2022-09-12 ENCOUNTER — APPOINTMENT (OUTPATIENT)
Dept: PHYSICAL THERAPY | Facility: CLINIC | Age: 37
End: 2022-09-12
Payer: COMMERCIAL

## 2022-09-12 ENCOUNTER — TELEPHONE (OUTPATIENT)
Dept: NEUROSURGERY | Facility: CLINIC | Age: 37
End: 2022-09-12

## 2022-09-19 ENCOUNTER — LAB (OUTPATIENT)
Dept: LAB | Facility: MEDICAL CENTER | Age: 37
End: 2022-09-19
Payer: COMMERCIAL

## 2022-09-19 ENCOUNTER — TELEPHONE (OUTPATIENT)
Dept: OBGYN CLINIC | Facility: MEDICAL CENTER | Age: 37
End: 2022-09-19

## 2022-09-19 DIAGNOSIS — N92.6 MISSED MENSES: ICD-10-CM

## 2022-09-19 DIAGNOSIS — N92.6 MISSED MENSES: Primary | ICD-10-CM

## 2022-09-19 LAB
ABO GROUP BLD: NORMAL
B-HCG SERPL-ACNC: ABNORMAL MIU/ML
BLD GP AB SCN SERPL QL: NEGATIVE
PROGEST SERPL-MCNC: 11.7 NG/ML
RH BLD: POSITIVE
SPECIMEN EXPIRATION DATE: NORMAL

## 2022-09-19 PROCEDURE — 86900 BLOOD TYPING SEROLOGIC ABO: CPT

## 2022-09-19 PROCEDURE — 84144 ASSAY OF PROGESTERONE: CPT

## 2022-09-19 PROCEDURE — 36415 COLL VENOUS BLD VENIPUNCTURE: CPT

## 2022-09-19 PROCEDURE — 84702 CHORIONIC GONADOTROPIN TEST: CPT

## 2022-09-19 PROCEDURE — 86901 BLOOD TYPING SEROLOGIC RH(D): CPT

## 2022-09-19 PROCEDURE — 86850 RBC ANTIBODY SCREEN: CPT

## 2022-09-20 NOTE — RESULT ENCOUNTER NOTE
Please call patient with results  Labs consistent with early pregnancy  9w 6d by LMP  Needs US for pregnancy dating/location  Can complete here or with radiology ideally within the week  Please help coordinate

## 2022-09-23 ENCOUNTER — APPOINTMENT (OUTPATIENT)
Dept: ULTRASOUND IMAGING | Facility: HOSPITAL | Age: 37
End: 2022-09-23
Payer: COMMERCIAL

## 2022-09-23 ENCOUNTER — HOSPITAL ENCOUNTER (EMERGENCY)
Facility: HOSPITAL | Age: 37
Discharge: HOME | End: 2022-09-23
Attending: EMERGENCY MEDICINE
Payer: COMMERCIAL

## 2022-09-23 VITALS
DIASTOLIC BLOOD PRESSURE: 75 MMHG | OXYGEN SATURATION: 97 % | RESPIRATION RATE: 18 BRPM | SYSTOLIC BLOOD PRESSURE: 111 MMHG | BODY MASS INDEX: 30.62 KG/M2 | WEIGHT: 184 LBS | TEMPERATURE: 97.6 F | HEART RATE: 83 BPM

## 2022-09-23 DIAGNOSIS — R10.31 RIGHT LOWER QUADRANT PAIN: ICD-10-CM

## 2022-09-23 DIAGNOSIS — Z34.90 INTRAUTERINE PREGNANCY: Primary | ICD-10-CM

## 2022-09-23 LAB
ALBUMIN SERPL BCP-MCNC: 3.6 G/DL (ref 3.5–5)
ALP SERPL-CCNC: 84 U/L (ref 46–116)
ALT SERPL W P-5'-P-CCNC: 35 U/L (ref 12–78)
ANION GAP SERPL CALCULATED.3IONS-SCNC: 9 MMOL/L (ref 4–13)
AST SERPL W P-5'-P-CCNC: 22 U/L (ref 5–45)
B-HCG SERPL-ACNC: ABNORMAL MIU/ML
BASOPHILS # BLD AUTO: 0.05 THOUSANDS/ΜL (ref 0–0.1)
BASOPHILS NFR BLD AUTO: 0 % (ref 0–1)
BILIRUB SERPL-MCNC: 0.16 MG/DL (ref 0.2–1)
BILIRUB UR QL STRIP: NEGATIVE
BUN SERPL-MCNC: 6 MG/DL (ref 5–25)
CALCIUM SERPL-MCNC: 9.4 MG/DL (ref 8.3–10.1)
CHLORIDE SERPL-SCNC: 102 MMOL/L (ref 96–108)
CLARITY UR: CLEAR
CO2 SERPL-SCNC: 24 MMOL/L (ref 21–32)
COLOR UR: YELLOW
CREAT SERPL-MCNC: 0.67 MG/DL (ref 0.6–1.3)
EOSINOPHIL # BLD AUTO: 0.22 THOUSAND/ΜL (ref 0–0.61)
EOSINOPHIL NFR BLD AUTO: 2 % (ref 0–6)
ERYTHROCYTE [DISTWIDTH] IN BLOOD BY AUTOMATED COUNT: 14.2 % (ref 11.6–15.1)
GFR SERPL CREATININE-BSD FRML MDRD: 111 ML/MIN/1.73SQ M
GLUCOSE SERPL-MCNC: 106 MG/DL (ref 65–140)
GLUCOSE UR STRIP-MCNC: NEGATIVE MG/DL
HCT VFR BLD AUTO: 40.5 % (ref 34.8–46.1)
HGB BLD-MCNC: 13.3 G/DL (ref 11.5–15.4)
HGB UR QL STRIP.AUTO: NEGATIVE
IMM GRANULOCYTES # BLD AUTO: 0.03 THOUSAND/UL (ref 0–0.2)
IMM GRANULOCYTES NFR BLD AUTO: 0 % (ref 0–2)
KETONES UR STRIP-MCNC: NEGATIVE MG/DL
LEUKOCYTE ESTERASE UR QL STRIP: NEGATIVE
LYMPHOCYTES # BLD AUTO: 2.84 THOUSANDS/ΜL (ref 0.6–4.47)
LYMPHOCYTES NFR BLD AUTO: 25 % (ref 14–44)
MCH RBC QN AUTO: 26.8 PG (ref 26.8–34.3)
MCHC RBC AUTO-ENTMCNC: 32.8 G/DL (ref 31.4–37.4)
MCV RBC AUTO: 82 FL (ref 82–98)
MONOCYTES # BLD AUTO: 0.77 THOUSAND/ΜL (ref 0.17–1.22)
MONOCYTES NFR BLD AUTO: 7 % (ref 4–12)
NEUTROPHILS # BLD AUTO: 7.45 THOUSANDS/ΜL (ref 1.85–7.62)
NEUTS SEG NFR BLD AUTO: 66 % (ref 43–75)
NITRITE UR QL STRIP: NEGATIVE
NRBC BLD AUTO-RTO: 0 /100 WBCS
PH UR STRIP.AUTO: 5.5 [PH]
PLATELET # BLD AUTO: 383 THOUSANDS/UL (ref 149–390)
PMV BLD AUTO: 10.5 FL (ref 8.9–12.7)
POTASSIUM SERPL-SCNC: 3.5 MMOL/L (ref 3.5–5.3)
PROT SERPL-MCNC: 7.2 G/DL (ref 6.4–8.4)
PROT UR STRIP-MCNC: NEGATIVE MG/DL
RBC # BLD AUTO: 4.97 MILLION/UL (ref 3.81–5.12)
SODIUM SERPL-SCNC: 135 MMOL/L (ref 135–147)
SP GR UR STRIP.AUTO: >=1.03 (ref 1–1.03)
UROBILINOGEN UR QL STRIP.AUTO: 0.2 E.U./DL
WBC # BLD AUTO: 11.36 THOUSAND/UL (ref 4.31–10.16)

## 2022-09-23 PROCEDURE — 81003 URINALYSIS AUTO W/O SCOPE: CPT

## 2022-09-23 PROCEDURE — 84702 CHORIONIC GONADOTROPIN TEST: CPT

## 2022-09-23 PROCEDURE — 36415 COLL VENOUS BLD VENIPUNCTURE: CPT

## 2022-09-23 PROCEDURE — 76801 OB US < 14 WKS SINGLE FETUS: CPT

## 2022-09-23 PROCEDURE — 96361 HYDRATE IV INFUSION ADD-ON: CPT

## 2022-09-23 PROCEDURE — 80053 COMPREHEN METABOLIC PANEL: CPT

## 2022-09-23 PROCEDURE — 96360 HYDRATION IV INFUSION INIT: CPT

## 2022-09-23 PROCEDURE — 99284 EMERGENCY DEPT VISIT MOD MDM: CPT

## 2022-09-23 PROCEDURE — 85025 COMPLETE CBC W/AUTO DIFF WBC: CPT

## 2022-09-23 PROCEDURE — 99284 EMERGENCY DEPT VISIT MOD MDM: CPT | Performed by: EMERGENCY MEDICINE

## 2022-09-23 RX ADMIN — SODIUM CHLORIDE 1000 ML: 0.9 INJECTION, SOLUTION INTRAVENOUS at 18:56

## 2022-09-23 NOTE — ED PROVIDER NOTES
History  Chief Complaint   Patient presents with    Medical Problem     Patient states she is about 6 weeks pregnant and gets her first ultrasound on Tuesday  Patient states for the last few days shes been having a lot of pressure and feels like she has to urinate more frequently  Patient denies and vaginal bleeding  Also states she has had previous miscarriages within the last year  Patient is a 41-year-old female with history of PCOS presents for evaluation of right lower abdominal pain  Patient remarks she is approximately 6 weeks pregnant  Her 1st OB visit is on the 27th of September, in 4 days  Patient states that beginning 2 days ago she developed some mild lower abdominal pressure  Beginning yesterday she developed some mild right lower quadrant discomfort  She states specifically this was not a pain but more of a discomfort  She denies radiation of the pain the marking that it sits straight there and her right groin  Patient denies fever, chills, additional abdominal pain, nausea, vomiting  Patient is otherwise well without complaint  Of note she has had prior miscarriages and is concerned that this may be a miscarriage  She denies any dysuria, vaginal bleeding  Prior to Admission Medications   Prescriptions Last Dose Informant Patient Reported?  Taking?   dicyclomine (BENTYL) 20 mg tablet   No No   Sig: Take 1 tablet (20 mg total) by mouth 2 (two) times a day   gabapentin (Neurontin) 100 mg capsule   No No   Sig: Take 1 capsule (100 mg total) by mouth daily at bedtime   methocarbamol (Robaxin-750) 750 mg tablet   No No   Sig: Take 1 tablet (750 mg total) by mouth every 6 (six) hours as needed for muscle spasms (neck pain)   omeprazole (PriLOSEC) 20 mg delayed release capsule   Yes No   Sig: Take 20 mg by mouth daily   ondansetron (Zofran ODT) 4 mg disintegrating tablet   No No   Sig: Take 1 tablet (4 mg total) by mouth every 6 (six) hours as needed for nausea or vomiting Facility-Administered Medications: None       Past Medical History:   Diagnosis Date    Sickle cell trait ([address] )        Past Surgical History:   Procedure Laterality Date    CHOLECYSTECTOMY         History reviewed  No pertinent family history  I have reviewed and agree with the history as documented  E-Cigarette/Vaping    E-Cigarette Use Never User      E-Cigarette/Vaping Substances     Social History     Tobacco Use    Smoking status: Never Smoker    Smokeless tobacco: Never Used   Vaping Use    Vaping Use: Never used   Substance Use Topics    Alcohol use: Never    Drug use: Never        Review of Systems   Constitutional: Negative  HENT: Negative  Eyes: Negative  Respiratory: Negative  Cardiovascular: Negative  Gastrointestinal: Negative  R-sided pelvic pressure   Endocrine: Negative  Genitourinary: Negative  Musculoskeletal: Negative  Skin: Negative  Allergic/Immunologic: Negative  Neurological: Negative  Hematological: Negative  Psychiatric/Behavioral: Negative  All other systems reviewed and are negative  Physical Exam  ED Triage Vitals   Temperature Pulse Respirations Blood Pressure SpO2   09/23/22 1821 09/23/22 1824 09/23/22 1824 09/23/22 1824 09/23/22 1824   97 6 °F (36 4 °C) 91 16 152/83 99 %      Temp Source Heart Rate Source Patient Position - Orthostatic VS BP Location FiO2 (%)   09/23/22 1821 09/23/22 1824 09/23/22 1900 -- --   Temporal Monitor Lying        Pain Score       --                    Orthostatic Vital Signs  Vitals:    09/23/22 1900 09/23/22 2000 09/23/22 2030 09/23/22 2100   BP: 115/60 122/65 114/61 111/75   Pulse: 85 77 74 83   Patient Position - Orthostatic VS: Lying Lying Lying Lying       Physical Exam  Vitals and nursing note reviewed  Constitutional:       General: She is not in acute distress  Appearance: Normal appearance  She is not ill-appearing, toxic-appearing or diaphoretic     HENT:      Head: Normocephalic and atraumatic  Eyes:      General: No scleral icterus  Right eye: No discharge  Left eye: No discharge  Extraocular Movements: Extraocular movements intact  Conjunctiva/sclera: Conjunctivae normal       Pupils: Pupils are equal, round, and reactive to light  Cardiovascular:      Rate and Rhythm: Normal rate  Pulses: Normal pulses  Heart sounds: Normal heart sounds  No murmur heard  No friction rub  No gallop  Pulmonary:      Effort: Pulmonary effort is normal  No respiratory distress  Breath sounds: Normal breath sounds  No stridor  No wheezing, rhonchi or rales  Abdominal:      General: Abdomen is flat  Bowel sounds are normal  There is no distension  Palpations: Abdomen is soft  Tenderness: There is no abdominal tenderness  There is no guarding or rebound  Comments: Patient reports minimal discomfort in the right lower quadrant  Patient clarifies this is not a pain, but a discomfort  Abdomen non peritonitic   Musculoskeletal:         General: No swelling  Normal range of motion  Cervical back: Normal range of motion  No rigidity  Right lower leg: No edema  Left lower leg: No edema  Skin:     General: Skin is warm and dry  Capillary Refill: Capillary refill takes less than 2 seconds  Coloration: Skin is not jaundiced  Findings: No bruising or lesion  Neurological:      General: No focal deficit present  Mental Status: She is alert and oriented to person, place, and time  Mental status is at baseline  Psychiatric:         Mood and Affect: Mood normal          Behavior: Behavior normal          Thought Content:  Thought content normal          Judgment: Judgment normal          ED Medications  Medications   sodium chloride 0 9 % bolus 1,000 mL (0 mL Intravenous Stopped 9/23/22 2115)       Diagnostic Studies  Results Reviewed     Procedure Component Value Units Date/Time    hCG, quantitative [[id number]] (Abnormal) Collected: 09/23/22 1854    Lab Status: Final result Specimen: Blood from Arm, Right Updated: 09/23/22 2001     HCG, Quant 76,590 5 mIU/mL     Narrative:       Expected Ranges:     Approximate               Approximate HCG  Gestation age          Concentration ( mIU/mL)  _____________          ______________________   Santosh Underwood                      HCG values  0 2-1                       5-50  1-2                           2-3                         100-5000  3-4                         500-87370  4-5                         1000-76525  5-6                         [id number]  6-8                         [id number]  8-12                        [id number]      Comprehensive metabolic panel [[id number]]  (Abnormal) Collected: 09/23/22 1854    Lab Status: Final result Specimen: Blood from Arm, Right Updated: 09/23/22 1930     Sodium 135 mmol/L      Potassium 3 5 mmol/L      Chloride 102 mmol/L      CO2 24 mmol/L      ANION GAP 9 mmol/L      BUN 6 mg/dL      Creatinine 0 67 mg/dL      Glucose 106 mg/dL      Calcium 9 4 mg/dL      AST 22 U/L      ALT 35 U/L      Alkaline Phosphatase 84 U/L      Total Protein 7 2 g/dL      Albumin 3 6 g/dL      Total Bilirubin 0 16 mg/dL      eGFR 111 ml/min/1 73sq m     Narrative:      Joanne guidelines for Chronic Kidney Disease (CKD):     Stage 1 with normal or high GFR (GFR > 90 mL/min/1 73 square meters)    Stage 2 Mild CKD (GFR = 60-89 mL/min/1 73 square meters)    Stage 3A Moderate CKD (GFR = 45-59 mL/min/1 73 square meters)    Stage 3B Moderate CKD (GFR = 30-44 mL/min/1 73 square meters)    Stage 4 Severe CKD (GFR = 15-29 mL/min/1 73 square meters)    Stage 5 End Stage CKD (GFR <15 mL/min/1 73 square meters)  Note: GFR calculation is accurate only with a steady state creatinine    UA (URINE) with reflex to Scope [[id number]] Collected: 09/23/22 1854    Lab Status: Final result Specimen: Urine, Clean Catch Updated: 09/23/22 1908 Color, UA Yellow     Clarity, UA Clear     Specific Gravity, UA >=1 030     pH, UA 5 5     Leukocytes, UA Negative     Nitrite, UA Negative     Protein, UA Negative mg/dl      Glucose, UA Negative mg/dl      Ketones, UA Negative mg/dl      Urobilinogen, UA 0 2 E U /dl      Bilirubin, UA Negative     Occult Blood, UA Negative    CBC and differential [[id number]]  (Abnormal) Collected: 09/23/22 1854    Lab Status: Final result Specimen: Blood from Arm, Right Updated: 09/23/22 1905     WBC 11 36 Thousand/uL      RBC 4 97 Million/uL      Hemoglobin 13 3 g/dL      Hematocrit 40 5 %      MCV 82 fL      MCH 26 8 pg      MCHC 32 8 g/dL      RDW 14 2 %      MPV 10 5 fL      Platelets 647 Thousands/uL      nRBC 0 /100 WBCs      Neutrophils Relative 66 %      Immat GRANS % 0 %      Lymphocytes Relative 25 %      Monocytes Relative 7 %      Eosinophils Relative 2 %      Basophils Relative 0 %      Neutrophils Absolute 7 45 Thousands/µL      Immature Grans Absolute 0 03 Thousand/uL      Lymphocytes Absolute 2 84 Thousands/µL      Monocytes Absolute 0 77 Thousand/µL      Eosinophils Absolute 0 22 Thousand/µL      Basophils Absolute 0 05 Thousands/µL                  US OB < 14 weeks with transvaginal   Final Result by Anjum Field MD (09/23 2022)      Single live intrauterine gestation at 7 weeks 0 days (range +/- 3)  ANT of 5/12/2023  Workstation performed: [id number]               Procedures  Procedures      ED Course                                       MDM  Number of Diagnoses or Management Options  Intrauterine pregnancy: new and does not require workup  Right lower quadrant pain: new and does not require workup  Diagnosis management comments: -patient presenting for evaluation of right lower quadrant pain  -laboratory evaluation within normal limits    Initial differential included appendicitis, ovarian torsion, ectopic pregnancy, corpus luteum cyst, ovarian cyst, UTI   -urine unremarkable   -ultrasound largely unremarkable  Ovaries and uterus normal, intrauterine gestation of approximately 7 weeks with positive cardiac activity  This rules out OB Gyne etiology   -I had a discussion with the patient regarding further workup for her right lower quadrant abdominal pain  I discussed in order for us to reliably rule out appendicitis or early appendiceal changes patient will have to be transferred to another facility and we will have to obtain an MRI  I offered an alternative to this patient of going home and monitoring her pain, with instructions to return if she develops pain worsening pain, fever, etc   Patient elected not to be transferred for definitive diagnostic imaging and instead to be sent home with attention given to how she feels  Patient verbalized understanding of this plan   -paperwork given to patient with explanation of reasons to come back and patient shown where in the packet additional reasons to come back are  Patient does have an appointment with OB coming up soon for her ultrasound, patient will keep that appointment   -patient discharged from emergency department         Amount and/or Complexity of Data Reviewed  Clinical lab tests: ordered and reviewed  Tests in the radiology section of CPT®: reviewed and ordered  Tests in the medicine section of CPT®: ordered and reviewed  Decide to obtain previous medical records or to obtain history from someone other than the patient: yes  Review and summarize past medical records: yes  Discuss the patient with other providers: yes  Independent visualization of images, tracings, or specimens: yes        Disposition  Final diagnoses:   Intrauterine pregnancy   Right lower quadrant pain     Time reflects when diagnosis was documented in both MDM as applicable and the Disposition within this note     Time User Action Codes Description Comment    9/23/2022  8:55 PM [facility]L05 46[facility] Intrauterine pregnancy     9/23/2022  8:56 PM Vance Nesbitt [facility] Add [R10 31] Right lower quadrant pain       ED Disposition     ED Disposition   Discharge    Condition   Stable    Date/Time   Fri Sep 23, 2022  8:55 PM    Comment   Kevin Otto discharge to home/self care  Follow-up Information     Follow up With Specialties Details Why Contact Info    Your OB/GYN  Schedule an appointment as soon as possible for a visit in 2 days            Discharge Medication List as of 9/23/2022  8:58 PM      CONTINUE these medications which have NOT CHANGED    Details   dicyclomine (BENTYL) 20 mg tablet Take 1 tablet (20 mg total) by mouth 2 (two) times a day, Starting Fri 8/12/2022, Normal      gabapentin (Neurontin) 100 mg capsule Take 1 capsule (100 mg total) by mouth daily at bedtime, Starting Tue 5/17/2022, Normal      methocarbamol (Robaxin-750) 750 mg tablet Take 1 tablet (750 mg total) by mouth every 6 (six) hours as needed for muscle spasms (neck pain), Starting Tue 5/17/2022, Normal      omeprazole (PriLOSEC) 20 mg delayed release capsule Take 20 mg by mouth daily, Historical Med      ondansetron (Zofran ODT) 4 mg disintegrating tablet Take 1 tablet (4 mg total) by mouth every 6 (six) hours as needed for nausea or vomiting, Starting Fri 8/12/2022, Normal           No discharge procedures on file  PDMP Review       Value Time User    PDMP Reviewed  Yes 10/13/2021  3:14 PM Iris Em PA-C           ED Provider  Attending physically available and evaluated Kevin Otto  I managed the patient along with the ED Attending      Electronically Signed by         Jewel Luo DO  09/23/22 9651

## 2022-09-23 NOTE — ED ATTENDING ATTESTATION
2022  IShannon MD, saw and evaluated the patient  I have discussed the patient with the resident/non-physician practitioner and agree with the resident's/non-physician practitioner's findings, Plan of Care, and MDM as documented in the resident's/non-physician practitioner's note, except where noted  All available labs and Radiology studies were reviewed  I was present for key portions of any procedure(s) performed by the resident/non-physician practitioner and I was immediately available to provide assistance  At this point I agree with the current assessment done in the Emergency Department  I have conducted an independent evaluation of this patient a history and physical is as follows:    43 yo female  blood type O+ presents 2 day history of right-sided abdominal pressure if she did have a she brief sharp twinges of pain yesterday which subsided she is approximately 6 weeks pregnant and has her 1st OB appointment in approximately 4 days  She has not yet had ultrasound for this pregnancy she denies any vaginal bleeding or discharge no dysuria increased urinary frequency no fever chills cough or upper respiratory complaints no trauma or falls no concerns for an STI  No back pain   She believes her last menstrual period is mid July he has been tolerating a diet has some intermittent nausea but no vomiting no anorexia Will be following up with [facility] in [place]    Physical exam  General patient is alert no acute respiratory distress  Vital signs temp 97 6° pulse 85 respirations 18 blood pressure 115/80 in sats of 97% on room air  HEENT atraumatic pupils are PERRLA EOM are intact TMs pale oropharynx is moist use midline there is no erythema or exudate  Neck FROM no LAD  Chest CTA   CV s1s2  Abdm (+)BS soft mild tenderness RLQ no rebound guarding no masses   Back no midline or CVA tenderness   Ext no edema  Neurologic exam cranial nerves 2-12 grossly intact moving all extremities symmetrically no sensory deficits    ED Course     65-year-old female with history of frequent miscarriages with right-sided abdominal pain no vaginal bleeding patient will undergo OB ultrasound to evaluate for possibility of ectopic pregnancy quantitative pregnancy test hydrate and re-evaluate at this time given history and physical exam findings doubt ovarian torsion or appendicitis    OB ultrasound was reviewed by Dr Patrizia Zimmerman with the patient underwent serial abdominal exam is not demonstrating a gagandeep acute abdomen at this time  Discussed the possibility of appendicitis offered transfer for MRI of the abdomen patient elected to defer at this time return precautions reviewed extensively    Patient will follow-up with OB and return to the emergency department with any new or worsening symptoms    Imp: RLQ abdominal pain; 7 wk IUP     Plan keep upcoming OB appt  Prenatal vitamins return with any new or worsening symptoms    Critical Care Time  Procedures

## 2022-09-24 NOTE — DISCHARGE INSTRUCTIONS
Call your local emergency number (911 in the [address]) if:   You have new chest pain or shortness of breath  Return to the emergency department if:   You have pulsing pain in your upper abdomen or lower back that suddenly becomes constant  Your pain is in the right lower abdominal area and worsens with movement  You have a fever over 100 4°F (38°C) or shaking chills  You are vomiting and cannot keep food or liquids down  Your pain does not improve or gets worse over the next 8 to 12 hours  You see blood in your vomit or bowel movements, or they look black and tarry  Your skin or the whites of your eyes turn yellow  You are a woman and have a large amount of vaginal bleeding that is not your monthly period  Call your doctor if:   You have pain in your lower back  You are a man and have pain in your testicles  You have pain when you urinate  You have questions or concerns about your condition or care

## 2022-09-27 ENCOUNTER — ULTRASOUND (OUTPATIENT)
Dept: OBGYN CLINIC | Facility: CLINIC | Age: 37
End: 2022-09-27
Payer: COMMERCIAL

## 2022-09-27 DIAGNOSIS — N92.6 MISSED MENSES: Primary | ICD-10-CM

## 2022-09-27 DIAGNOSIS — R11.2 NAUSEA AND VOMITING, UNSPECIFIED VOMITING TYPE: ICD-10-CM

## 2022-09-27 PROCEDURE — 99203 OFFICE O/P NEW LOW 30 MIN: CPT | Performed by: OBSTETRICS & GYNECOLOGY

## 2022-09-27 PROCEDURE — 76817 TRANSVAGINAL US OBSTETRIC: CPT | Performed by: OBSTETRICS & GYNECOLOGY

## 2022-09-27 RX ORDER — DIPHENHYDRAMINE HYDROCHLORIDE 25 MG/1
25 CAPSULE ORAL DAILY
Qty: 60 TABLET | Refills: 1 | Status: SHIPPED | OUTPATIENT
Start: 2022-09-27

## 2022-09-27 NOTE — PROGRESS NOTES
Pregnancy Confirmation Visit  [facility]  [address], [address], [place], [place]    Assessment/Plan:  44 y o  [id number] presenting with missed menses  Viable pregnancy 7w3d by first trimester ultrasound; not consistent with LMP of 7/13/22  - Continue/start prenatal vitamin  - MFM consultation placed  - Schedule prenatal nursing Intake in 2 weeks  - Initial prenatal visit in 4 weeks  - script for Vitamin B6 provided    Subjective:    CC: Missed period    Jeannine Ferro is a 44 y o  [id number] who presents with missed menses  LMP No LMP recorded (lmp unknown)  Patient is pregnant         Patient notes that this pregnancy was planned and desired  She was not using contraception at the time of conception  She reports she is uncertain of her LMP  she was seen in the ER and had an ultrasound which showed a pregnancy at 6w6d and a small subchorionic hemorrhage  Patient denies vaginal bleeding     Objective:  LMP  (LMP Unknown)     Physical Exam:  General: Well appearing, no distress  CV: Regular rate  Respiratory: Unlabored breathing  Abdomen: Soft, nontender  Extremities: Without edema  Mood and Affect: Appropriate    Transvaginal Pelvic Ultrasound  Gross IUP  Yolk sac: Present  Fetal Pole: Present  CRL consistent with EGA 7w3d  Cardiac activity: Present   bpm  No adnexal masses appreciated

## 2022-09-30 ENCOUNTER — HOSPITAL ENCOUNTER (EMERGENCY)
Facility: HOSPITAL | Age: 37
Discharge: HOME | End: 2022-09-30
Attending: EMERGENCY MEDICINE
Payer: COMMERCIAL

## 2022-09-30 ENCOUNTER — APPOINTMENT (OUTPATIENT)
Dept: ULTRASOUND IMAGING | Facility: HOSPITAL | Age: 37
End: 2022-09-30
Payer: COMMERCIAL

## 2022-09-30 VITALS
WEIGHT: 193.12 LBS | DIASTOLIC BLOOD PRESSURE: 59 MMHG | HEART RATE: 80 BPM | BODY MASS INDEX: 32.14 KG/M2 | OXYGEN SATURATION: 100 % | SYSTOLIC BLOOD PRESSURE: 105 MMHG | RESPIRATION RATE: 16 BRPM | TEMPERATURE: 97.5 F

## 2022-09-30 DIAGNOSIS — R10.32 LEFT INGUINAL PAIN: Primary | ICD-10-CM

## 2022-09-30 LAB
BILIRUB UR QL STRIP: NEGATIVE
CLARITY UR: CLEAR
COLOR UR: YELLOW
GLUCOSE UR STRIP-MCNC: NEGATIVE MG/DL
HGB UR QL STRIP.AUTO: NEGATIVE
KETONES UR STRIP-MCNC: NEGATIVE MG/DL
LEUKOCYTE ESTERASE UR QL STRIP: NEGATIVE
NITRITE UR QL STRIP: NEGATIVE
PH UR STRIP.AUTO: 6 [PH]
PROT UR STRIP-MCNC: NEGATIVE MG/DL
SP GR UR STRIP.AUTO: 1.02 (ref 1–1.03)
UROBILINOGEN UR QL STRIP.AUTO: 0.2 E.U./DL

## 2022-09-30 PROCEDURE — 76816 OB US FOLLOW-UP PER FETUS: CPT

## 2022-09-30 PROCEDURE — 99284 EMERGENCY DEPT VISIT MOD MDM: CPT

## 2022-09-30 PROCEDURE — 87086 URINE CULTURE/COLONY COUNT: CPT | Performed by: EMERGENCY MEDICINE

## 2022-09-30 PROCEDURE — 76817 TRANSVAGINAL US OBSTETRIC: CPT

## 2022-09-30 PROCEDURE — 99282 EMERGENCY DEPT VISIT SF MDM: CPT | Performed by: EMERGENCY MEDICINE

## 2022-09-30 PROCEDURE — 81003 URINALYSIS AUTO W/O SCOPE: CPT | Performed by: EMERGENCY MEDICINE

## 2022-09-30 RX ORDER — ACETAMINOPHEN 325 MG/1
650 TABLET ORAL ONCE
Status: COMPLETED | OUTPATIENT
Start: 2022-09-30 | End: 2022-09-30

## 2022-09-30 RX ADMIN — ACETAMINOPHEN 650 MG: 325 TABLET ORAL at 10:49

## 2022-09-30 NOTE — ED PROVIDER NOTES
History  Chief Complaint   Patient presents with    Abdominal Pain     Went to bend over this am felt pulling in abdomen into vagina  Pt 7 weeks pregnant  No vaginal bleeding noted     45 y/o woman approx 8 wk pregnant by recent US presents to ED with pain in L inguinal/LLQ beginning about 0700 this morning  She had been bent over at the waist for about 2 5 minutes helping to get her children ready for school  The pain developed upon standing; it has remained in the LLQ since that time with mild ttp and mild worsening upon position change  It is not particularly severe  She also urinated once at home and once in the emergency department and noted that this seemed to decrease the pain on both occasions  No dysuria/urgency/frequency/hematuria  No vaginal bleeding this morning; no bleeding since her recent ED visit on 23 September  +cholcystectomy; no prior  surgery  Did take 325 mg acetaminophen at home which seems to have decreased the pain somewhat  She was seen in this emergency department 23 September 2022 with right lower quadrant/right inguinal pain  Had pelvic ultrasound that point demonstrating seven week 0 day single IUP  She is scheduled to follow with a gynecologist in the next several weeks  History provided by:  Patient and medical records  Abdominal Pain  Associated symptoms: no diarrhea, no dysuria, no nausea, no vaginal bleeding and no vomiting        Prior to Admission Medications   Prescriptions Last Dose Informant Patient Reported? Taking?    Prenatal w/o A Vit-Fe Fum-FA (PRENATA PO)   Yes Yes   Sig: Take by mouth   Pyridoxine HCl (vitamin B-6) 25 MG tablet   No Yes   Sig: Take 1 tablet (25 mg total) by mouth daily   dicyclomine (BENTYL) 20 mg tablet   No No   Sig: Take 1 tablet (20 mg total) by mouth 2 (two) times a day   Patient not taking: Reported on 9/27/2022   gabapentin (Neurontin) 100 mg capsule   No No   Sig: Take 1 capsule (100 mg total) by mouth daily at bedtime   Patient not taking: Reported on 9/27/2022   methocarbamol (Robaxin-750) 750 mg tablet   No No   Sig: Take 1 tablet (750 mg total) by mouth every 6 (six) hours as needed for muscle spasms (neck pain)   Patient not taking: Reported on 9/27/2022   omeprazole (PriLOSEC) 20 mg delayed release capsule   Yes No   Sig: Take 20 mg by mouth daily   Patient not taking: Reported on 9/27/2022   ondansetron (Zofran ODT) 4 mg disintegrating tablet   No No   Sig: Take 1 tablet (4 mg total) by mouth every 6 (six) hours as needed for nausea or vomiting   Patient not taking: Reported on 9/27/2022      Facility-Administered Medications: None       Past Medical History:   Diagnosis Date    [facility]        Past Surgical History:   Procedure Laterality Date    CHOLECYSTECTOMY         History reviewed  No pertinent family history  I have reviewed and agree with the history as documented  E-Cigarette/Vaping    E-Cigarette Use Never User      E-Cigarette/Vaping Substances     Social History     Tobacco Use    Smoking status: Never Smoker    Smokeless tobacco: Never Used   Vaping Use    Vaping Use: Never used   Substance Use Topics    Alcohol use: Never    Drug use: Never       Review of Systems   Constitutional: Negative  HENT: Negative  Respiratory: Negative  Cardiovascular: Negative  Gastrointestinal: Positive for abdominal pain  Negative for diarrhea, nausea and vomiting  Genitourinary: Negative for difficulty urinating, dysuria, vaginal bleeding and vaginal pain  Musculoskeletal: Negative  Neurological: Negative  Hematological: Negative  Physical Exam  Physical Exam  Vitals and nursing note reviewed  Constitutional:       General: She is awake  She is not in acute distress  Appearance: Normal appearance  She is well-developed  HENT:      Head: Normocephalic and atraumatic        Right Ear: Hearing and external ear normal       Left Ear: Hearing and external ear normal    Neck: Trachea: Trachea and phonation normal    Cardiovascular:      Rate and Rhythm: Normal rate and regular rhythm  Pulses:           Radial pulses are 2+ on the right side and 2+ on the left side  Dorsalis pedis pulses are 2+ on the right side and 2+ on the left side  Posterior tibial pulses are 2+ on the right side and 2+ on the left side  Heart sounds: Normal heart sounds, S1 normal and S2 normal  No murmur heard  No friction rub  No gallop  Pulmonary:      Effort: Pulmonary effort is normal  No respiratory distress  Breath sounds: Normal breath sounds  No stridor  No decreased breath sounds, wheezing, rhonchi or rales  Abdominal:      Tenderness: There is abdominal tenderness in the suprapubic area and left lower quadrant  There is no right CVA tenderness, left CVA tenderness, guarding or rebound  Hernia: There is no hernia in the left inguinal area or right inguinal area  Comments: Very minimal LLQ ttp into suprapubic region   Genitourinary:     General: Normal vulva  Exam position: Supine  Labia:         Right: No tenderness or injury  Left: No tenderness or injury  Comments: Exam assisted by Puja Jackson RN  Lymphadenopathy:      Lower Body: No right inguinal adenopathy  Skin:     General: Skin is warm and dry  Neurological:      Mental Status: She is alert and oriented to person, place, and time  GCS: GCS eye subscore is 4  GCS verbal subscore is 5  GCS motor subscore is 6  Cranial Nerves: No cranial nerve deficit  Sensory: No sensory deficit  Motor: No abnormal muscle tone  Comments: PERRLA; EOMI  Sensation intact to light touch over face in V1-V3 distribution bilaterally  Facial expressions symmetric  Tongue/uvula midline  Shoulder shrug equal bilaterally  Strength 5/5 in UE/LE bilaterally  Sensation intact to light touch in UE/LE bilaterally           Vital Signs  ED Triage Vitals [09/30/22 0934]   Temperature Pulse Respirations Blood Pressure SpO2   97 5 °F (36 4 °C) 93 18 127/60 100 %      Temp src Heart Rate Source Patient Position - Orthostatic VS BP Location FiO2 (%)   -- Monitor Sitting Left arm --      Pain Score       6           Vitals:    09/30/22 0934 09/30/22 1050   BP: 127/60 105/59   Pulse: 93 80   Patient Position - Orthostatic VS: Sitting Sitting         Visual Acuity      ED Medications  Medications   acetaminophen (TYLENOL) tablet 650 mg (650 mg Oral Given 9/30/22 1049)       Diagnostic Studies  Results Reviewed     Procedure Component Value Units Date/Time    UA w Reflex to Microscopic w Reflex to Culture [[id number]] Collected: 09/30/22 0950    Lab Status: Final result Specimen: Urine, Clean Catch Updated: 09/30/22 1000     Color, UA Yellow     Clarity, UA Clear     Specific Avery, UA 1 020     pH, UA 6 0     Leukocytes, UA Negative     Nitrite, UA Negative     Protein, UA Negative mg/dl      Glucose, UA Negative mg/dl      Ketones, UA Negative mg/dl      Urobilinogen, UA 0 2 E U /dl      Bilirubin, UA Negative     Occult Blood, UA Negative     URINE COMMENT --    Urine culture [[id number]] Collected: 09/30/22 0950    Lab Status: In process Specimen: Urine, Clean Catch Updated: 09/30/22 1000                 US OB Follow up with Transvaginal   Final Result by Jeanne Marte MD (09/30 1109)      Single live intrauterine gestation at 7 weeks 5 days (range +/- 5 days)      Interval growth within expected range of normal   The ANT remains 5/12/2023, as determined on the initial scan  Workstation performed: [id number]                    Procedures  Procedures     US 23 Sept 2022:      FINDINGS:     A single live intrauterine gestation is identified  Cardiac activity is present  Heart rate of 131 bpm      YOLK SAC:  Present and normal in size and appearance    MEAN GESTATIONAL SAC SIZE: 25 mm = 7 weeks 1 days   MEAN CROWN RUMP LENGTH:  9 mm = 6 weeks 6 days   FETAL ANATOMY:  Appropriate for gestational age  AMNIOTIC FLUID/SAC SHAPE:  Within expected normal range  PLACENTA:  The placenta is appropriate for gestational age      2 6 x 1 2 x 1 2 cm subchronic hemorrhage      UTERUS/ADNEXA:   The uterus and ovaries are within normal limits  The cervix remains closed  No free fluid present      IMPRESSION:     Single live intrauterine gestation at 7 weeks 0 days (range +/- 3)      ANT of 5/12/2023  ED Course  ED Course as of 09/30/22 1308   Fri Sep 30, 2022   1003 UA w Reflex to Microscopic w Reflex to Culture  Normal   1045 Ultrasound completed and awaiting interpretation   1112 US OB Follow up with Transvaginal  IMPRESSION:     Single live intrauterine gestation at 7 weeks 5 days (range +/- 5 days)     Interval growth within expected range of normal   The ANT remains 5/12/2023, as determined on the initial scan       1112 No abnormalities identified on ultrasound  No evidence of inguinal hernia on examination  No urinary abnormalities  Likely abdominal wall/inguinal strain accounting for symptoms  Would advise continued use of acetaminophen with activity as tolerated and follow up with her gynecologist as previously scheduled  ED return for any vaginal bleeding or intractable pain  This plan was discussed with the patient who was in agreement  All questions were answered to her satisfaction prior to discharge  SBIRT 20yo+    Flowsheet Row Most Recent Value   SBIRT (23 yo +)    In order to provide better care to our patients, we are screening all of our patients for alcohol and drug use  Would it be okay to ask you these screening questions? No Filed at: 09/30/2022 0941   Initial Alcohol Screen: US AUDIT-C     1  How often do you have a drink containing alcohol? 0 Filed at: 09/30/2022 0941   2  How many drinks containing alcohol do you have on a typical day you are drinking? 0 Filed at: 09/30/2022 0941   3a  Male UNDER 65:  How often do you have five or more drinks on one occasion? 0 Filed at: 09/30/2022 0941   3b  FEMALE Any Age, or MALE 65+: How often do you have 4 or more drinks on one occassion? 0 Filed at: 09/30/2022 0941   Audit-C Score 0 Filed at: 09/30/2022 1336   DES: How many times in the past year have you    Used an illegal drug or used a prescription medication for non-medical reasons? Never Filed at: 09/30/2022 0941          MDM    Disposition  Final diagnoses:   Left inguinal strain     Time reflects when diagnosis was documented in both MDM as applicable and the Disposition within this note     Time User Action Codes Description Comment    9/30/2022 11:13 AM Lamona Board Add [R10 32] Left inguinal pain     9/30/2022 11:13 AM Lamona Board Modify [R10 32] Left inguinal strain       ED Disposition     ED Disposition   Discharge    Condition   Stable    Date/Time   Fri Sep 30, 2022 11:12 AM    Comment   Derik Client discharge to home/self care                 Follow-up Information    None         Discharge Medication List as of 9/30/2022 11:15 AM      CONTINUE these medications which have NOT CHANGED    Details   Prenatal w/o A Vit-Fe Fum-FA (PRENATA PO) Take by mouth, Historical Med      Pyridoxine HCl (vitamin B-6) 25 MG tablet Take 1 tablet (25 mg total) by mouth daily, Starting Tue 9/27/2022, Normal      dicyclomine (BENTYL) 20 mg tablet Take 1 tablet (20 mg total) by mouth 2 (two) times a day, Starting Fri 8/12/2022, Normal      gabapentin (Neurontin) 100 mg capsule Take 1 capsule (100 mg total) by mouth daily at bedtime, Starting Tue 5/17/2022, Normal      methocarbamol (Robaxin-750) 750 mg tablet Take 1 tablet (750 mg total) by mouth every 6 (six) hours as needed for muscle spasms (neck pain), Starting Tue 5/17/2022, Normal      omeprazole (PriLOSEC) 20 mg delayed release capsule Take 20 mg by mouth daily, Historical Med      ondansetron (Zofran ODT) 4 mg disintegrating tablet Take 1 tablet (4 mg total) by mouth every 6 (six) hours as needed for nausea or vomiting, Starting Fri 8/12/2022, Normal             No discharge procedures on file      PDMP Review       Value Time User    PDMP Reviewed  Yes 10/13/2021  3:14 PM Lylia Runner, PA-C          ED Provider  Electronically Signed by           Evone MedicineDO  09/30/22 1961

## 2022-09-30 NOTE — DISCHARGE INSTRUCTIONS
The US today was normal and shows the expected growth of the fetus since the last ultrasound  You can continue using acetaminophen (Tylenol) according to package instructions for pain, if needed  You can continue performing whatever activity you are able to tolerate  Please keep the appointment scheduled with your gynecologist in the next several weeks  If you develop any vaginal bleeding or additional pain in your stomach, you should go to the ER

## 2022-10-02 LAB — BACTERIA UR CULT: NORMAL

## 2022-10-10 ENCOUNTER — HOSPITAL ENCOUNTER (EMERGENCY)
Facility: HOSPITAL | Age: 37
Discharge: HOME | End: 2022-10-10
Attending: EMERGENCY MEDICINE
Payer: COMMERCIAL

## 2022-10-10 VITALS
RESPIRATION RATE: 18 BRPM | DIASTOLIC BLOOD PRESSURE: 67 MMHG | SYSTOLIC BLOOD PRESSURE: 104 MMHG | HEIGHT: 66 IN | HEART RATE: 79 BPM | WEIGHT: 187 LBS | TEMPERATURE: 97.5 F | OXYGEN SATURATION: 99 % | BODY MASS INDEX: 30.05 KG/M2

## 2022-10-10 DIAGNOSIS — O26.891 ABDOMINAL PAIN DURING PREGNANCY IN FIRST TRIMESTER: Primary | ICD-10-CM

## 2022-10-10 DIAGNOSIS — R10.9 ABDOMINAL PAIN DURING PREGNANCY IN FIRST TRIMESTER: Primary | ICD-10-CM

## 2022-10-10 PROCEDURE — 99282 EMERGENCY DEPT VISIT SF MDM: CPT | Performed by: EMERGENCY MEDICINE

## 2022-10-10 PROCEDURE — 81003 URINALYSIS AUTO W/O SCOPE: CPT | Performed by: EMERGENCY MEDICINE

## 2022-10-10 PROCEDURE — 76815 OB US LIMITED FETUS(S): CPT | Performed by: EMERGENCY MEDICINE

## 2022-10-10 PROCEDURE — 87086 URINE CULTURE/COLONY COUNT: CPT | Performed by: EMERGENCY MEDICINE

## 2022-10-10 PROCEDURE — 99284 EMERGENCY DEPT VISIT MOD MDM: CPT

## 2022-10-10 RX ORDER — PYRIDOXINE HCL (VITAMIN B6) 50 MG
50 TABLET ORAL ONCE
Status: COMPLETED | OUTPATIENT
Start: 2022-10-10 | End: 2022-10-10

## 2022-10-10 RX ORDER — ACETAMINOPHEN 325 MG/1
975 TABLET ORAL ONCE
Status: COMPLETED | OUTPATIENT
Start: 2022-10-10 | End: 2022-10-10

## 2022-10-10 RX ORDER — PYRIDOXINE HCL (VITAMIN B6) 50 MG
50 TABLET ORAL DAILY
Status: DISCONTINUED | OUTPATIENT
Start: 2022-10-11 | End: 2022-10-10

## 2022-10-10 RX ADMIN — ACETAMINOPHEN 975 MG: 325 TABLET, FILM COATED ORAL at 22:01

## 2022-10-10 RX ADMIN — Medication 50 MG: at 22:00

## 2022-10-11 ENCOUNTER — TELEPHONE (OUTPATIENT)
Dept: NEUROSURGERY | Facility: CLINIC | Age: 37
End: 2022-10-11

## 2022-10-11 NOTE — DISCHARGE INSTRUCTIONS
You have been seen for abdominal pain  Please take tylenol as needed for discomfort  Return to the emergency department if you develop worsening pain, vaginal bleeding or any other symptoms of concern  Please follow up with your OB/GYN by calling the number provided

## 2022-10-11 NOTE — ED PROVIDER NOTES
History  Chief Complaint   Patient presents with   • Abdominal Pain     Lower abdominal pain that started today  States her son jumped on her abdomen and has been having pain since  States she has been nauseous since last night, denies any vomiting  Currently about 9 weeks pregnant      Loan Renae is a 44y o  year old  female presenting to the [facility] ED for evaluation of abdominal pain  Patient has had several hours of lower abdominal discomfort  Pain rated as 6/10, constant and nonradiating  Symptoms started after patient's son was laying with the patient and went to stand up on the couch and accidentally stepped on the mother's abdomen  Denies vaginal bleeding or leakage of fluid  No dysuria/hematuria or flank pain  Associated nausea which has been ongoing in first trimester  Denies vomiting or idarrhea  Patient currently established with an OB  Patient has not taken/received any medications at home for relief of symptoms  OB history notable for two prior spontaneous miscarriages  History provided by:  Patient and medical records   used: No    Abdominal Pain  Associated symptoms: nausea    Associated symptoms: no chest pain, no diarrhea, no dysuria, no fever, no hematuria, no shortness of breath, no vaginal bleeding, no vaginal discharge and no vomiting        Prior to Admission Medications   Prescriptions Last Dose Informant Patient Reported? Taking?    Prenatal w/o A Vit-Fe Fum-FA (PRENATA PO)   Yes No   Sig: Take by mouth   Pyridoxine HCl (vitamin B-6) 25 MG tablet   No No   Sig: Take 1 tablet (25 mg total) by mouth daily   dicyclomine (BENTYL) 20 mg tablet   No No   Sig: Take 1 tablet (20 mg total) by mouth 2 (two) times a day   Patient not taking: Reported on 2022   gabapentin (Neurontin) 100 mg capsule   No No   Sig: Take 1 capsule (100 mg total) by mouth daily at bedtime   Patient not taking: Reported on 2022   methocarbamol (Robaxin-750) 750 mg tablet No No   Sig: Take 1 tablet (750 mg total) by mouth every 6 (six) hours as needed for muscle spasms (neck pain)   Patient not taking: Reported on 9/27/2022   omeprazole (PriLOSEC) 20 mg delayed release capsule   Yes No   Sig: Take 20 mg by mouth daily   Patient not taking: Reported on 9/27/2022   ondansetron (Zofran ODT) 4 mg disintegrating tablet   No No   Sig: Take 1 tablet (4 mg total) by mouth every 6 (six) hours as needed for nausea or vomiting   Patient not taking: Reported on 9/27/2022      Facility-Administered Medications: None       Past Medical History:   Diagnosis Date   • Sickle cell trait ([facility] )        Past Surgical History:   Procedure Laterality Date   • CHOLECYSTECTOMY         History reviewed  No pertinent family history  I have reviewed and agree with the history as documented  E-Cigarette/Vaping   • E-Cigarette Use Never User      E-Cigarette/Vaping Substances     Social History     Tobacco Use   • Smoking status: Never Smoker   • Smokeless tobacco: Never Used   Vaping Use   • Vaping Use: Never used   Substance Use Topics   • Alcohol use: Never   • Drug use: Never       Review of Systems   Constitutional: Negative for fever  HENT: Negative for congestion  Respiratory: Negative for shortness of breath  Cardiovascular: Negative for chest pain  Gastrointestinal: Positive for abdominal pain and nausea  Negative for diarrhea and vomiting  Genitourinary: Negative for dysuria, flank pain, hematuria, vaginal bleeding and vaginal discharge  Musculoskeletal: Negative for arthralgias and myalgias  Skin: Negative for rash  Neurological: Negative for light-headedness  Psychiatric/Behavioral: Negative for confusion  All other systems reviewed and are negative  Physical Exam  Physical Exam  Vitals and nursing note reviewed  Constitutional:       General: She is not in acute distress  Appearance: Normal appearance  She is well-developed   She is not ill-appearing, toxic-appearing or diaphoretic  HENT:      Head: Normocephalic and atraumatic  Nose: No congestion or rhinorrhea  Eyes:      General:         Right eye: No discharge  Left eye: No discharge  Cardiovascular:      Rate and Rhythm: Normal rate and regular rhythm  Pulmonary:      Effort: Pulmonary effort is normal  No accessory muscle usage or respiratory distress  Breath sounds: Normal breath sounds  No stridor  No decreased breath sounds, wheezing, rhonchi or rales  Abdominal:      General: Bowel sounds are normal  There is no distension  Palpations: Abdomen is soft  Tenderness: There is abdominal tenderness in the suprapubic area  There is no right CVA tenderness, left CVA tenderness, guarding or rebound  Musculoskeletal:      Cervical back: Normal range of motion  No rigidity  Right lower leg: No tenderness  No edema  Left lower leg: No tenderness  No edema  Skin:     Capillary Refill: Capillary refill takes less than 2 seconds  Findings: No rash  Neurological:      Mental Status: She is alert and oriented to person, place, and time     Psychiatric:         Mood and Affect: Mood normal          Behavior: Behavior normal          Vital Signs  ED Triage Vitals [10/10/22 2103]   Temperature Pulse Respirations Blood Pressure SpO2   97 5 °F (36 4 °C) 72 18 116/82 100 %      Temp Source Heart Rate Source Patient Position - Orthostatic VS BP Location FiO2 (%)   Temporal Monitor Lying Left arm --      Pain Score       6           Vitals:    10/10/22 2103 10/10/22 2145 10/10/22 2200   BP: 116/82  104/67   Pulse: 72 76 79   Patient Position - Orthostatic VS: Lying           Visual Acuity      ED Medications  Medications   acetaminophen (TYLENOL) tablet 975 mg (975 mg Oral Given 10/10/22 2201)   pyridoxine (VITAMIN B6) tablet 50 mg (50 mg Oral Given 10/10/22 2200)       Diagnostic Studies  Results Reviewed     Procedure Component Value Units Date/Time    UA w Reflex to Microscopic w Reflex to Culture [[id number]] Collected: 10/10/22 2133    Lab Status: Final result Specimen: Urine, Clean Catch Updated: 10/10/22 2139     Color, UA Yellow     Clarity, UA Clear     Specific Kinsman, UA 1 020     pH, UA 6 0     Leukocytes, UA Negative     Nitrite, UA Negative     Protein, UA Negative mg/dl      Glucose, UA Negative mg/dl      Ketones, UA Negative mg/dl      Urobilinogen, UA 0 2 E U /dl      Bilirubin, UA Negative     Occult Blood, UA Negative     URINE COMMENT --    Urine culture [[id number]] Collected: 10/10/22 2133    Lab Status: In process Specimen: Urine, Clean Catch Updated: 10/10/22 2139                 No orders to display              Procedures  POC Pelvic US    Date/Time: 10/10/2022 9:17 PM  Performed by: Celia Song DO  Authorized by: Celia Song DO     Patient location:  ED  Procedure details:     Exam Type:  Diagnostic    Indications: evaluate for IUP and pregnant with abdominal pain      Assessment for: evaluate fetal viability      Technique:  Transabdominal obstetric (HCG+) exam    Views obtained: uterus (transverse and sagittal)      Image quality: diagnostic      Image availability:  Images available in PACS  Uterine findings:     Endometrial stripe: identified      Intrauterine pregnancy: identified      Fetal heart rate: identified      Fetal heart rate (bpm):  158  Interpretation:     Ultrasound impressions: normal      Pregnancy findings: intrauterine pregnancy (IUP)               ED Course                                             MDM  Number of Diagnoses or Management Options  Abdominal pain during pregnancy in first trimester  Diagnosis management comments:   44 y o  female presenting for abdominal discomfort  VSS, nontoxic appearing  Chart reviewed, IUP previously confirmed during current pregnancy  Will check UA and perform POCUS to evaluate FHR  Reassessment: UA without findings consistent with cystitis/bacteriuria   POCUS performed with mother at bedside  Disposition: I have discussed with the patient our plan to discharge them from the ED and the patient is in agreement with this plan  Discharge Plan: PRN tylenol for pain, OB f/u  RTED precautions emphasized  The patient was provided a written after visit summary with strict RTED precautions  Followup: I have discussed with the patient plan to follow up with OB/GYN  Contact information provided in AVS        Amount and/or Complexity of Data Reviewed  Clinical lab tests: ordered and reviewed  Tests in the radiology section of CPT®: reviewed  Review and summarize past medical records: yes  Independent visualization of images, tracings, or specimens: yes    Patient Progress  Patient progress: stable      Disposition  Final diagnoses:   Abdominal pain during pregnancy in first trimester     Time reflects when diagnosis was documented in both MDM as applicable and the Disposition within this note     Time User Action Codes Description Comment    10/10/2022  9:24 PM Sandraindia Jacqueline Add [O26 891,  R10 9] Abdominal pain during pregnancy in first trimester       ED Disposition     ED Disposition   Discharge    Condition   Stable    Date/Time   Mon Oct 10, 2022  9:24 PM    [address]  discharge to home/self care  Follow-up Information     Follow up With Specialties Details Why Contact Info Additional [id number] [facility] Schedule an appointment as soon as possible for a visit  To make appointment for reevaluation in 3-5 days   [address] [id number]  [address] [place], [place], [address]          Discharge Medication List as of 10/10/2022  9:41 PM      CONTINUE these medications which have NOT CHANGED    Details   dicyclomine (BENTYL) 20 mg tablet Take 1 tablet (20 mg total) by mouth 2 (two) times a day, Starting Fri 8/12/2022, Normal      gabapentin (Neurontin) 100 mg capsule Take 1 capsule (100 mg total) by mouth daily at bedtime, Starting Tue 5/17/2022, Normal      methocarbamol (Robaxin-750) 750 mg tablet Take 1 tablet (750 mg total) by mouth every 6 (six) hours as needed for muscle spasms (neck pain), Starting Tue 5/17/2022, Normal      omeprazole (PriLOSEC) 20 mg delayed release capsule Take 20 mg by mouth daily, Historical Med      ondansetron (Zofran ODT) 4 mg disintegrating tablet Take 1 tablet (4 mg total) by mouth every 6 (six) hours as needed for nausea or vomiting, Starting Fri 8/12/2022, Normal      Prenatal w/o A Vit-Fe Fum-FA (PRENATA PO) Take by mouth, Historical Med      Pyridoxine HCl (vitamin B-6) 25 MG tablet Take 1 tablet (25 mg total) by mouth daily, Starting Tue 9/27/2022, Normal             No discharge procedures on file      PDMP Review       Value Time User    PDMP Reviewed  Yes 10/13/2021  3:14 PM Nate Grace PA-C          ED Provider  Electronically Signed by           Douglas Camacho DO  10/11/22 2049

## 2022-10-12 LAB — BACTERIA UR CULT: NORMAL

## 2022-10-17 ENCOUNTER — INITIAL PRENATAL (OUTPATIENT)
Dept: OBGYN CLINIC | Facility: MEDICAL CENTER | Age: 37
End: 2022-10-17
Payer: COMMERCIAL

## 2022-10-17 VITALS
WEIGHT: 192 LBS | DIASTOLIC BLOOD PRESSURE: 74 MMHG | SYSTOLIC BLOOD PRESSURE: 122 MMHG | HEIGHT: 66 IN | BODY MASS INDEX: 30.86 KG/M2

## 2022-10-17 DIAGNOSIS — Z34.81 PRENATAL CARE, SUBSEQUENT PREGNANCY, FIRST TRIMESTER: Primary | ICD-10-CM

## 2022-10-17 PROCEDURE — T1001 NURSING ASSESSMENT/EVALUATN: HCPCS | Performed by: OBSTETRICS & GYNECOLOGY

## 2022-10-17 NOTE — PROGRESS NOTES
[id number]  OB History    Para Term  AB Living   5 2 2   2 2   SAB IAB Ectopic Multiple Live Births   2       2      # Outcome Date GA Lbr New/2nd Weight Sex Delivery Anes PTL Lv   5 Current            4 SAB 2021 4w0d          3 SAB 10/2021 7w0d          2 Term 17 40w0d  3884 g (8 lb 9 oz) M Vag-Spont  N KARLA   1 Term 10/05/04 40w0d  3430 g (7 lb 9 oz) F Vag-Spont   KARLA         * Pt presents for OB intake  *[id number]  *Pt's LMP was Patient's last menstrual period was 2022  *Ultrasound date:2022   7weeks 3days  *Estimated date of delivery: 2023   * confirmed by us    *Signs/Symptoms of Pregnancy   *yes Constipation    *no Headaches   *no Cramping  *no Spotting   Diabetes     *no Hx of GDM    *no BMI >35    *yes First degree relative with type 2 diabetes     Hypertension-    *no Hx of chronic HTN    *no Hx of gestational HTN   *no hx of preeclampsia, eclampsia, or HELLP syndrome     *Infection Screening   *no Does the pt have a hx of MRSA? *no History of herpes?      *Immunizations:   *yes Discussed TDaP vaccine   *yes COVID Vaccine  Received     ACTIVE MEDICAL/MENTAL HEALTH CONDITIONS  Depression *n   Anxiety*n  Medications*n  *Interview education   *Handouts given:    *Baby and Me support center     *MyChart sign up instructions    *Lab Locations    *[facility]ians List/Choosing Pediatrician Sheet    *[facility] Childbirth and Parenting Classes    *Schedule for Prenatal Visits    *Pregnancy Warning Signs Reviewed    *Safe Medications During Pregnancy    *SMA and CF Testing information sheet    *CPT Code Sheet/MFM 13week NT pamphlet    *[employer]      SBIRT Screen:  Depression Screening Follow-up Plan: Patient's depression screening was negative with an [place] score of  6     *[facility]   *yes discussed genetic testing- pt interested  Patient has been informed of basic prenatal advice such as avoiding alcohol, drugs, and smoking  She should remain hydrated and take daily prenatal vitamins  Patient should avoid caffeine, raw sprouts, high mercury fish, undercooked fish, raw eggs, organ meat, unwashed produce, and unpasteurized cheeses, milk, and fruit juice and undercooked meats  She has been informed about toxoplasmosis and to avoid cat feces  *Details that I feel the provider should be aware of:  Eris Morales was seen for her ob intake at 10w2d  Prenatal panel and early gtt ordered  PN1 visit scheduled for *10/31/2022  The patient was oriented to our practice and all questions were answered      Interviewed by: Evan Patel

## 2022-10-19 ENCOUNTER — APPOINTMENT (OUTPATIENT)
Dept: LAB | Facility: MEDICAL CENTER | Age: 37
End: 2022-10-19
Payer: COMMERCIAL

## 2022-10-19 DIAGNOSIS — Z34.81 PRENATAL CARE, SUBSEQUENT PREGNANCY, FIRST TRIMESTER: ICD-10-CM

## 2022-10-19 LAB
ABO GROUP BLD: NORMAL
BASOPHILS # BLD AUTO: 0.04 THOUSANDS/ÂΜL (ref 0–0.1)
BASOPHILS NFR BLD AUTO: 1 % (ref 0–1)
BILIRUB UR QL STRIP: NEGATIVE
BLD GP AB SCN SERPL QL: NEGATIVE
CLARITY UR: CLEAR
COLOR UR: NORMAL
EOSINOPHIL # BLD AUTO: 0.24 THOUSAND/ÂΜL (ref 0–0.61)
EOSINOPHIL NFR BLD AUTO: 3 % (ref 0–6)
ERYTHROCYTE [DISTWIDTH] IN BLOOD BY AUTOMATED COUNT: 14.6 % (ref 11.6–15.1)
GLUCOSE 1H P 50 G GLC PO SERPL-MCNC: 81 MG/DL (ref 40–134)
GLUCOSE UR STRIP-MCNC: NEGATIVE MG/DL
HBV SURFACE AG SER QL: NORMAL
HCT VFR BLD AUTO: 41.4 % (ref 34.8–46.1)
HGB BLD-MCNC: 13.1 G/DL (ref 11.5–15.4)
HGB UR QL STRIP.AUTO: NEGATIVE
IMM GRANULOCYTES # BLD AUTO: 0.03 THOUSAND/UL (ref 0–0.2)
IMM GRANULOCYTES NFR BLD AUTO: 0 % (ref 0–2)
KETONES UR STRIP-MCNC: NEGATIVE MG/DL
LEUKOCYTE ESTERASE UR QL STRIP: NEGATIVE
LYMPHOCYTES # BLD AUTO: 2.11 THOUSANDS/ÂΜL (ref 0.6–4.47)
LYMPHOCYTES NFR BLD AUTO: 24 % (ref 14–44)
MCH RBC QN AUTO: 26.3 PG (ref 26.8–34.3)
MCHC RBC AUTO-ENTMCNC: 31.6 G/DL (ref 31.4–37.4)
MCV RBC AUTO: 83 FL (ref 82–98)
MONOCYTES # BLD AUTO: 0.51 THOUSAND/ÂΜL (ref 0.17–1.22)
MONOCYTES NFR BLD AUTO: 6 % (ref 4–12)
NEUTROPHILS # BLD AUTO: 5.95 THOUSANDS/ÂΜL (ref 1.85–7.62)
NEUTS SEG NFR BLD AUTO: 66 % (ref 43–75)
NITRITE UR QL STRIP: NEGATIVE
NRBC BLD AUTO-RTO: 0 /100 WBCS
PH UR STRIP.AUTO: 6.5 [PH]
PLATELET # BLD AUTO: 347 THOUSANDS/UL (ref 149–390)
PMV BLD AUTO: 12.1 FL (ref 8.9–12.7)
PROT UR STRIP-MCNC: NEGATIVE MG/DL
RBC # BLD AUTO: 4.98 MILLION/UL (ref 3.81–5.12)
RH BLD: POSITIVE
RUBV IGG SERPL IA-ACNC: 51.6 IU/ML
SP GR UR STRIP.AUTO: 1.02 (ref 1–1.03)
SPECIMEN EXPIRATION DATE: NORMAL
UROBILINOGEN UR STRIP-ACNC: <2 MG/DL
WBC # BLD AUTO: 8.88 THOUSAND/UL (ref 4.31–10.16)

## 2022-10-19 PROCEDURE — 36415 COLL VENOUS BLD VENIPUNCTURE: CPT

## 2022-10-19 PROCEDURE — 82950 GLUCOSE TEST: CPT

## 2022-10-19 PROCEDURE — 87086 URINE CULTURE/COLONY COUNT: CPT

## 2022-10-19 PROCEDURE — 80081 OBSTETRIC PANEL INC HIV TSTG: CPT

## 2022-10-19 PROCEDURE — 81003 URINALYSIS AUTO W/O SCOPE: CPT

## 2022-10-20 LAB
BACTERIA UR CULT: NORMAL
HIV 1+2 AB+HIV1 P24 AG SERPL QL IA: NORMAL
RPR SER QL: NORMAL

## 2022-10-25 ENCOUNTER — TELEPHONE (OUTPATIENT)
Dept: NEUROSURGERY | Facility: CLINIC | Age: 37
End: 2022-10-25

## 2022-10-25 NOTE — TELEPHONE ENCOUNTER
Patient called back and is aware she needs to see PM  Provided Dr Ro Zabala phone number  Rescheduled appt x 1 month to 11/29 to give patient time to schedule   Patient was made aware to call our office back if she gets appt sooner with PM

## 2022-10-25 NOTE — TELEPHONE ENCOUNTER
lmom on patients machine to discuss f/u  Patient needs to complete PM as per [facility]   Called Rodriguez (sig other) he will forward message to patient to call our office

## 2022-10-31 ENCOUNTER — INITIAL PRENATAL (OUTPATIENT)
Dept: OBGYN CLINIC | Facility: CLINIC | Age: 37
End: 2022-10-31

## 2022-10-31 VITALS — BODY MASS INDEX: 31.89 KG/M2 | WEIGHT: 197.6 LBS | SYSTOLIC BLOOD PRESSURE: 106 MMHG | DIASTOLIC BLOOD PRESSURE: 64 MMHG

## 2022-10-31 DIAGNOSIS — Z34.91 FIRST TRIMESTER PREGNANCY: ICD-10-CM

## 2022-10-31 DIAGNOSIS — Z3A.12 12 WEEKS GESTATION OF PREGNANCY: ICD-10-CM

## 2022-10-31 DIAGNOSIS — D57.3 SICKLE CELL TRAIT IN MOTHER AFFECTING PREGNANCY (HCC): ICD-10-CM

## 2022-10-31 DIAGNOSIS — O99.019 SICKLE CELL TRAIT IN MOTHER AFFECTING PREGNANCY (HCC): ICD-10-CM

## 2022-10-31 NOTE — PROGRESS NOTES
Initial Prenatal Visit  [facility] [address], [place]    Assessment/Plan:  Hugh Gabriel is a 44y o  year old [id number] at [address] who presents for initial prenatal visit  Supervision of normal pregnancy  - Prenatal labs reviewed and normal   Blood type: O positive  - Aneuploidy screening discussed  Patient is undecided regarding aneuploidy screening   - Routine cervical cancer screening: Pap Up to date  - Routine STI Screening: GC/Chlamydia sent today  HIV/Hep B/Syphilis ordered in prenatal panel   - Patient Education: Patient was counseled regarding diet, exercise, weight gain, foods to avoid, vaccines in pregnancy, aneuploidy screening, travel precautions to include seat belt use and VTE risk reduction  She has been provided our pregnancy packet which includes how and when to contact providers, medication recommendations, dietary suggestions, breastfeeding information as well as websites for additional information, hospital and delivery concerns  Additional Pregnancy Problems:   1  Sickle cell trait in mother affecting pregnancy ([address] )    2  First trimester pregnancy    3  12 weeks gestation of pregnancy  -     Chlamydia/GC amplified DNA by PCR          Subjective:   CC:  Desires prenatal care  Maryann johnny is a 44 y o  [id number] female who presents for prenatal care  Pregnancy ROS: no leakage of fluid, pelvic pain, or vaginal bleeding  Improving nausea/vomiting  Notes that she is having constipation- recommend colace   via u/s  Feels that she may have felt movement  Notes good dietary intake and fluid intake       The following portions of the patient's history were reviewed and updated as appropriate: allergies, current medications, past family history, past medical history, obstetric history, gynecologic history, past social history, past surgical history and problem list       Objective:  /64   Wt 89 6 kg (197 lb 9 6 oz)   LMP 07/13/2022 BMI 31 89 kg/m²   Pregravid Weight/BMI: Pregravid weight not on file (BMI Could not be calculated)  Current Weight: 89 6 kg (197 lb 9 6 oz)   Total Weight Gain: Not found  Pre-Magaly Vitals    Flowsheet Row Most Recent Value   Prenatal Assessment    Fetal Heart Rate 162   Prenatal Vitals    Blood Pressure 106/64   Weight - Scale 89 6 kg (197 lb 9 6 oz)   Urine Albumin/Glucose    Dilation/Effacement/Station    Vaginal Drainage    Edema          General: Well appearing, no distress  Respiratory: Normal respiratory rate, lungs clear to auscultation, no wheezing or rales  Cardiovascular: Regular rate and rhythm, no murmurs, rubs, or gallops  Breasts: Normal bilaterally, nontender without masses, asymmetry, or nipple discharge  Abdomen: Soft, gravid, nontender  : Urethra normal  Normal labia majora and minora  Vagina normal   No vaginal bleeding  No vaginal discharge  Cervix visually closed  Extremities: Warm and well perfused  Non tender  No edema

## 2022-11-01 ENCOUNTER — ROUTINE PRENATAL (OUTPATIENT)
Dept: PERINATAL CARE | Facility: OTHER | Age: 37
End: 2022-11-01

## 2022-11-01 VITALS
SYSTOLIC BLOOD PRESSURE: 124 MMHG | HEIGHT: 66 IN | HEART RATE: 76 BPM | WEIGHT: 196.6 LBS | DIASTOLIC BLOOD PRESSURE: 74 MMHG | BODY MASS INDEX: 31.6 KG/M2

## 2022-11-01 DIAGNOSIS — Z36.82 NUCHAL TRANSLUCENCY OF FETUS ON PRENATAL ULTRASOUND: ICD-10-CM

## 2022-11-01 DIAGNOSIS — E28.2 PCOS (POLYCYSTIC OVARIAN SYNDROME): ICD-10-CM

## 2022-11-01 DIAGNOSIS — D57.3 SICKLE CELL TRAIT (HCC): ICD-10-CM

## 2022-11-01 DIAGNOSIS — Z71.85 VACCINE COUNSELING: ICD-10-CM

## 2022-11-01 DIAGNOSIS — E66.9 OBESITY (BMI 30.0-34.9): ICD-10-CM

## 2022-11-01 DIAGNOSIS — Z3A.12 12 WEEKS GESTATION OF PREGNANCY: Primary | ICD-10-CM

## 2022-11-01 DIAGNOSIS — Z13.79 GENETIC SCREENING: ICD-10-CM

## 2022-11-01 DIAGNOSIS — O09.521 MULTIGRAVIDA OF ADVANCED MATERNAL AGE IN FIRST TRIMESTER: ICD-10-CM

## 2022-11-01 LAB
C TRACH DNA SPEC QL NAA+PROBE: NEGATIVE
N GONORRHOEA DNA SPEC QL NAA+PROBE: NEGATIVE

## 2022-11-01 NOTE — PROGRESS NOTES
A fetal ultrasound was completed  See Ob procedures in Epic for an interpretation and recommendations  Do not hesitate to contact us in [facility] if you have questions  Erick Bird MD, [address] [facility]  Maternal Fetal Medicine

## 2022-11-01 NOTE — PROGRESS NOTES
Patient chose to have [facility] Non-invasive Prenatal Screen with fetal sex  Patient given brochure and is aware [facility] will contact patients insurance and coordinate coverage  Patient made aware she will need to respond to text message or e-mail from [employer] within 2 business days or testing will be run through insurance  Patient informed text message will come from area code  "415"  Provided [employer] # [phone] and web site : [email or web address]  "[place] your test online" card with barcode and test tube ID provided to patient  Reviewed [employer]'s web site states 5-7 business days for results via their portal  [facility] states 7-10 business days for results to be in [facility] [address]  A [employer] message will be sent once we receive results  2 vials of blood drawn from left arm by A  Tish Duenas RN  Patient tolerated blood draw without difficulty  Specimens labeled with patient identifiers (name, date of birth, specimen collection date), order and specimen was verified with patient, packed and sent via [address]  Copy of lab order scanned to Epic media  Maternal Fetal Medicine will have results in approximately 7-10 business days and will call patient or notify via [address]  Patient aware viewing lab result online will reveal fetal sex if ordered  Patient verbalized understanding of all instructions and no questions at this time

## 2022-11-01 NOTE — LETTER
November 1, 2022     Fred Woods MD  [address]    Patient: Daisy Nowak   YOB: 1985   Date of Visit: 11/1/2022       Dear Dr Kim Roldan:    Thank you for referring Mary Ann Pinto to me for evaluation  Below are my notes for this consultation  If you have questions, please do not hesitate to call me  I look forward to following your patient along with you  Sincerely,        Moi Hughes MD        CC: No Recipients  Moi Hughes MD  11/1/2022  2:40 PM  Sign when Signing Visit  A fetal ultrasound was completed  See Ob procedures in Epic for an interpretation and recommendations  Do not hesitate to contact us in [facility] if you have questions  Uziel Gagnon MD, [address] [facility]  Maternal Fetal Medicine

## 2022-11-08 ENCOUNTER — HOSPITAL ENCOUNTER (EMERGENCY)
Facility: HOSPITAL | Age: 37
Discharge: HOME | End: 2022-11-08
Attending: EMERGENCY MEDICINE

## 2022-11-08 ENCOUNTER — CONSULT (OUTPATIENT)
Dept: PAIN MEDICINE | Facility: CLINIC | Age: 37
End: 2022-11-08

## 2022-11-08 VITALS
WEIGHT: 194.8 LBS | BODY MASS INDEX: 31.31 KG/M2 | HEIGHT: 66 IN | SYSTOLIC BLOOD PRESSURE: 138 MMHG | DIASTOLIC BLOOD PRESSURE: 79 MMHG | HEART RATE: 101 BPM

## 2022-11-08 VITALS
DIASTOLIC BLOOD PRESSURE: 64 MMHG | WEIGHT: 201.06 LBS | SYSTOLIC BLOOD PRESSURE: 106 MMHG | TEMPERATURE: 97.3 F | HEART RATE: 85 BPM | RESPIRATION RATE: 18 BRPM | BODY MASS INDEX: 32.45 KG/M2 | OXYGEN SATURATION: 99 %

## 2022-11-08 DIAGNOSIS — R10.84 GENERALIZED ABDOMINAL DISCOMFORT: Primary | ICD-10-CM

## 2022-11-08 DIAGNOSIS — M79.18 MYOFASCIAL PAIN SYNDROME: ICD-10-CM

## 2022-11-08 DIAGNOSIS — M54.2 NECK PAIN: Primary | ICD-10-CM

## 2022-11-08 LAB
ANION GAP SERPL CALCULATED.3IONS-SCNC: 10 MMOL/L (ref 4–13)
BACTERIA UR QL AUTO: NORMAL /HPF
BASOPHILS # BLD AUTO: 0.04 THOUSANDS/ÂΜL (ref 0–0.1)
BASOPHILS NFR BLD AUTO: 0 % (ref 0–1)
BILIRUB UR QL STRIP: NEGATIVE
BUN SERPL-MCNC: 8 MG/DL (ref 5–25)
CALCIUM SERPL-MCNC: 9.3 MG/DL (ref 8.3–10.1)
CHLORIDE SERPL-SCNC: 102 MMOL/L (ref 96–108)
CLARITY UR: CLEAR
CO2 SERPL-SCNC: 24 MMOL/L (ref 21–32)
COLOR UR: YELLOW
CREAT SERPL-MCNC: 0.64 MG/DL (ref 0.6–1.3)
EOSINOPHIL # BLD AUTO: 0.28 THOUSAND/ÂΜL (ref 0–0.61)
EOSINOPHIL NFR BLD AUTO: 3 % (ref 0–6)
ERYTHROCYTE [DISTWIDTH] IN BLOOD BY AUTOMATED COUNT: 14.1 % (ref 11.6–15.1)
GFR SERPL CREATININE-BSD FRML MDRD: 113 ML/MIN/1.73SQ M
GLUCOSE SERPL-MCNC: 85 MG/DL (ref 65–140)
GLUCOSE UR STRIP-MCNC: NEGATIVE MG/DL
HCT VFR BLD AUTO: 37.9 % (ref 34.8–46.1)
HGB BLD-MCNC: 12.6 G/DL (ref 11.5–15.4)
HGB UR QL STRIP.AUTO: NEGATIVE
IMM GRANULOCYTES # BLD AUTO: 0.02 THOUSAND/UL (ref 0–0.2)
IMM GRANULOCYTES NFR BLD AUTO: 0 % (ref 0–2)
KETONES UR STRIP-MCNC: NEGATIVE MG/DL
LEUKOCYTE ESTERASE UR QL STRIP: ABNORMAL
LIPASE SERPL-CCNC: 82 U/L (ref 73–393)
LYMPHOCYTES # BLD AUTO: 2.14 THOUSANDS/ÂΜL (ref 0.6–4.47)
LYMPHOCYTES NFR BLD AUTO: 22 % (ref 14–44)
MCH RBC QN AUTO: 26.7 PG (ref 26.8–34.3)
MCHC RBC AUTO-ENTMCNC: 33.2 G/DL (ref 31.4–37.4)
MCV RBC AUTO: 80 FL (ref 82–98)
MONOCYTES # BLD AUTO: 0.56 THOUSAND/ÂΜL (ref 0.17–1.22)
MONOCYTES NFR BLD AUTO: 6 % (ref 4–12)
NEUTROPHILS # BLD AUTO: 6.64 THOUSANDS/ÂΜL (ref 1.85–7.62)
NEUTS SEG NFR BLD AUTO: 69 % (ref 43–75)
NITRITE UR QL STRIP: NEGATIVE
NON-SQ EPI CELLS URNS QL MICRO: NORMAL /HPF
NRBC BLD AUTO-RTO: 0 /100 WBCS
PH UR STRIP.AUTO: 6 [PH]
PLATELET # BLD AUTO: 297 THOUSANDS/UL (ref 149–390)
PMV BLD AUTO: 10.9 FL (ref 8.9–12.7)
POTASSIUM SERPL-SCNC: 3.4 MMOL/L (ref 3.5–5.3)
PROT UR STRIP-MCNC: NEGATIVE MG/DL
RBC # BLD AUTO: 4.72 MILLION/UL (ref 3.81–5.12)
RBC #/AREA URNS AUTO: NORMAL /HPF
SODIUM SERPL-SCNC: 136 MMOL/L (ref 135–147)
SP GR UR STRIP.AUTO: <=1.005 (ref 1–1.03)
UROBILINOGEN UR QL STRIP.AUTO: 0.2 E.U./DL
WBC # BLD AUTO: 9.68 THOUSAND/UL (ref 4.31–10.16)
WBC #/AREA URNS AUTO: NORMAL /HPF

## 2022-11-08 RX ORDER — MAGNESIUM HYDROXIDE/ALUMINUM HYDROXICE/SIMETHICONE 120; 1200; 1200 MG/30ML; MG/30ML; MG/30ML
30 SUSPENSION ORAL ONCE
Status: COMPLETED | OUTPATIENT
Start: 2022-11-08 | End: 2022-11-08

## 2022-11-08 RX ADMIN — ALUMINUM HYDROXIDE, MAGNESIUM HYDROXIDE, AND DIMETHICONE 30 ML: 200; 20; 200 SUSPENSION ORAL at 15:56

## 2022-11-08 NOTE — PROGRESS NOTES
[facility] Otolaryngology Head and Neck Surgery  CLINIC NOTE    Chief Complaint   Patient presents with   • Follow-up     recheck ears          HPI   Follow up  Accompanied by:  caregiver  Octavio Werner is a 32 y.o. male who is here for follow up. He has had improved hearing. Audio not done because of residual wax on TM  He is status post No surgery found on No surgery found.        History     Last Reviewed by Roland Gonzáles Jr., MD on 7/13/2021 at  1:30 PM    Sections Reviewed    Medical, Family, Tobacco, Surgical      Problem list reviewed by Roland Gonzáles Jr., MD on 7/13/2021 at  1:30 PM  Medicines reviewed by Roland Gonzáles Jr., MD on 7/13/2021 at  1:30 PM  Allergies reviewed by Roland Gonzáles Jr., MD on 7/13/2021 at  1:30 PM         Vital Signs:        Physical Exam  Constitutional:       General: He is not in acute distress.     Appearance: He is well-developed. He is obese.      Comments: In wheelchair, retracted L side  Slowed speech   HENT:      Head:      Comments: Prior craniotomy R side of head     Right Ear: External ear normal. There is impacted cerumen.      Left Ear: External ear normal. There is impacted cerumen.      Ears:      Comments: See procedure note     Nose: Septal deviation (R to L general with obstruction) and mucosal edema (pale, moderate) present. No nasal deformity.      Right Turbinates: Enlarged and swollen.      Left Turbinates: Enlarged and swollen.      Mouth/Throat:      Mouth: Mucous membranes are dry. No oral lesions.      Dentition: Abnormal dentition (missing teeth upper and lower).      Tongue: No lesions. Tongue does not deviate from midline.      Pharynx: Oropharynx is clear. Uvula midline.      Tonsils: 2+ on the right. 2+ on the left.      Comments: Macroglossia- mod to severe, prolapse moderate  Palate very low  Eyes:      General: Lids are normal. Gaze aligned appropriately.      Extraocular Movements: Extraocular  Assessment:  1  Neck pain    2  Myofascial pain syndrome        Plan:  Patient is a 30-year-old female complaints of neck pain and bilateral arm and hand pain with chronic pain syndrome secondary to cervical degenerative disc disease, cervical disc herniation, cervical spondylosis presents to office for initial consultation  Patient has MRI of cervical spine does show C5-C6 disc herniation to the left with foraminal narrowing some mild displacement of the spinal core no signs of spinal cord edema  There is also osteophyte formation at the C5-C6 disc space anteriorly  Patient is currently in physical therapy however she does note exacerbation of symptoms thereafter when she undergoes the after session massage  1  Once patient has we will consider a cervical epidural steroid injection  2  Patient will continue cervical strengthening exercises  3  We will consider the iron neck in the near future for cervical strengthen       History of Present Illness: The patient is a 44 y o  female who presents for consultation in regards to Neck Pain, Shoulder Pain, Arm Pain, Hand Pain, and Back Pain  Symptoms have been present for 2 years  Symptoms began without any precipitating injury or trauma  Pain is reported to be 6 on the numeric rating scale  Symptoms are felt nearly constantly and worst in the no typical pattern  Symptoms are characterized as burning, shooting, sharp, numbing, tingling and throbbing  Symptoms are associated with bilateral arm weakness  Aggravating factors include lying down, standing, sitting, walking, exercise and turning the head  Relieving factors include nothing  No change in symptoms with kneeling, bending, leaning forward, leaning bckward, coughing/sneezing and bowel movements  Treatments that have been helpful include heat/ice  physical therapy and home exercise have provided no relief  Medications to relieve symptoms include nothing      Review of Systems:    Review of Systems Constitutional: Negative for fever and unexpected weight change  HENT: Negative for trouble swallowing  Eyes: Positive for pain  Negative for visual disturbance  Respiratory: Negative for shortness of breath and wheezing  Cardiovascular: Negative for chest pain and palpitations  Gastrointestinal: Negative for constipation, diarrhea, nausea and vomiting  Endocrine: Negative for cold intolerance, heat intolerance and polydipsia  Genitourinary: Negative for difficulty urinating and frequency  Musculoskeletal: Positive for myalgias  Negative for arthralgias, gait problem and joint swelling  Skin: Negative for rash  Neurological: Positive for numbness  Negative for dizziness, seizures, syncope and weakness  Hematological: Does not bruise/bleed easily  Psychiatric/Behavioral: Negative for dysphoric mood  All other systems reviewed and are negative          Past Medical History:   Diagnosis Date   • Polycystic ovary syndrome    • Sickle cell trait ([facility] [address] )        Past Surgical History:   Procedure Laterality Date   • CHOLECYSTECTOMY         Family History   Problem Relation Age of Onset   • Diabetes Mother    • Hypertension Mother    • Kidney disease Mother    • Diabetes Father    • Hypertension Father    • No Known Problems Daughter    • No Known Problems Son    • Diabetes Maternal Grandmother    • Alzheimer's disease Maternal Grandfather    • Liver cancer Paternal Grandmother    • Breast cancer Neg Hx    • Colon cancer Neg Hx    • Ovarian cancer Neg Hx        Social History     Occupational History   • Not on file   Tobacco Use   • Smoking status: Never Smoker   • Smokeless tobacco: Never Used   Vaping Use   • Vaping Use: Never used   Substance and Sexual Activity   • Alcohol use: Not Currently   • Drug use: Never   • Sexual activity: Yes     Partners: Male         Current Outpatient Medications:   •  omeprazole (PriLOSEC) 20 mg delayed release capsule, Take 20 mg by mouth daily (Patient not movements intact.      Conjunctiva/sclera: Conjunctivae normal.      Comments: Color brown  Glasses   Neck:        Comments: Very short, very thick  Larynx low  Trachea deep  Trach scar present  Cardiovascular:      Rate and Rhythm: Regular rhythm.      Heart sounds: Normal heart sounds.   Pulmonary:      Effort: Pulmonary effort is normal. No tachypnea or respiratory distress.      Breath sounds: No stridor.   Neurological:      Mental Status: He is alert.   Psychiatric:         Attention and Perception: Attention and perception normal.         Mood and Affect: Mood and affect normal.         Speech: Speech is delayed.         Behavior: Behavior is slowed. Behavior is cooperative.         Thought Content: Thought content normal.         Cognition and Memory: Cognition is impaired (Mild).               Result Review       I have reviewed the patients old records in the chart.            Assessment:        Diagnosis Plan   1. Hearing loss, unspecified hearing loss type, unspecified laterality  Comprehensive Hearing Test    Audio pending   2. Bilateral impacted cerumen  Comprehensive Hearing Test    removed from TMs AU   3. Conductive hearing loss of both ears  Comprehensive Hearing Test    due to cerumen   4. Cerumen debris on tympanic membrane of both ears      removed              Plan:        Continue current management plan.  Had cerumen cleared today.  I will plan audio in the near future.  Ear care with oil  Audio ordered        Orders Placed This Encounter   Procedures   • Comprehensive Hearing Test       MY CHART:  Patient is Encouraged to enroll in My Chart  Encouraged to review data and findings in My Chart    Patient understand(s) and agree(s) with the treatment plan as described.    Return After Audio, for Recheck hearing.            Roland Gonzáles Jr, MD  07/13/21  13:33 CDT   taking: No sig reported), Disp: , Rfl:   •  Prenatal w/o A Vit-Fe Fum-FA (PRENATA PO), Take by mouth, Disp: , Rfl:   •  Pyridoxine HCl (vitamin B-6) 25 MG tablet, Take 1 tablet (25 mg total) by mouth daily, Disp: 60 tablet, Rfl: 1    Allergies   Allergen Reactions   • Shellfish Allergy - Food Allergy Hives       Physical Exam:    /79 (BP Location: Left arm, Patient Position: Sitting, Cuff Size: Standard)   Pulse 101   Ht 5' 6" (1 676 m)   Wt 88 4 kg (194 lb 12 8 oz)   LMP 07/13/2022   BMI 31 44 kg/m²     Constitutional: normal, well developed, well nourished, alert, in no distress and non-toxic and no overt pain behavior  Eyes: anicteric  HEENT: grossly intact  Neck: supple, symmetric, trachea midline and no masses   Pulmonary:even and unlabored  Cardiovascular:No edema or pitting edema present  Skin:Normal without rashes or lesions and well hydrated  Psychiatric:Mood and affect appropriate  Neurologic:Cranial Nerves II-XII grossly intact  Musculoskeletal:normal    Imaging  MRI CERVICAL SPINE WITHOUT CONTRAST     INDICATION: R20 2: Paresthesia of skin  M79 601: Pain in right arm  M79 602: Pain in left arm  R29 898: Other symptoms and signs involving the musculoskeletal system      COMPARISON:  None      TECHNIQUE:  Sagittal T1, sagittal T2, sagittal inversion recovery, axial T2, axial  2D merge     IMAGE QUALITY:  Diagnostic     FINDINGS:     ALIGNMENT:  Minor straightening of normal cervical lordosis without subluxation or fracture      MARROW SIGNAL:  Normal marrow signal is identified within the visualized bony structures    No discrete marrow lesion      CERVICAL AND VISUALIZED THORACIC CORD:  Normal signal within the visualized cord      PREVERTEBRAL AND PARASPINAL SOFT TISSUES:  Normal      VISUALIZED POSTERIOR FOSSA:  The visualized posterior fossa demonstrates no abnormal signal      CERVICAL DISC SPACES:     C2-C3:  Normal      C3-C4:  Normal      C4-C5:  Normal      C5-C6:  Circumferential bulging the discs with anterior osteophytes  Disc osteophyte extends asymmetrically to the left, deforming and rotating the cord with high probability compression left C6 root as it emerges from the ventral cord surface  Correlate for left C6 radiculitis      C6-C7:  Normal      C7-T1:  Normal      UPPER THORACIC DISC SPACES:  Normal      IMPRESSION:     Left posterolateral disc osteophyte C5-C6 resulting in rotation and minor flattening of the cord    Correlate for left C6 radiculitis     yes

## 2022-11-08 NOTE — PATIENT INSTRUCTIONS
Neck Exercises   WHAT YOU NEED TO KNOW:   Why is it important to do neck exercises? Neck exercises help reduce neck pain, and improve neck movement and strength  Neck exercises also help prevent long-term neck problems  What do I need to know about neck exercises? Do the exercises every day,  or as often as directed by your healthcare provider  Move slowly, gently, and smoothly  Avoid fast or jerky motions  Stand and sit the way your healthcare provider shows you  Good posture may reduce your neck pain  Check your posture often, even when you are not doing your neck exercises  How do I perform neck exercises safely? Exercise position:  You may sit or stand while you do neck exercises  Face forward  Your shoulders should be straight and relaxed, with a good posture  Head tilts, forward and back:  Gently bow your head and try to touch your chin to your chest  Your healthcare provider may tell you to push on the back of your neck to help bow your head  Raise your chin back to the starting position  Tilt your head back as far as possible so you are looking up at the ceiling  Your healthcare provider may tell you to lift your chin to help tilt your head back  Return your head to the starting position  Head tilts, side to side:  Tilt your head, bringing your ear toward your shoulder  Then tilt your head toward the other shoulder  Head turns:  Turn your head to look over your shoulder  Tilt your chin down and try to touch it to your shoulder  Do not raise your shoulder to your chin  Face forward again  Do the same on the other side  Head rolls:  Slowly bring your chin toward your chest  Next, roll your head to the right  Your ear should be positioned over your shoulder  Hold this position for 5 seconds  Roll your head back toward your chest and to the left into the same position  Hold for 5 seconds  Gently roll your head back and around in a clockwise [place] 3 times   Next, move your head in the reverse direction (counterclockwise) in a [place] 3 times  Do not shrug your shoulders upwards while you do this exercise  When should I contact my healthcare provider? Your pain does not get better, or gets worse  You have questions or concerns about your condition, care, or exercise program     CARE AGREEMENT:   You have the right to help plan your care  Learn about your health condition and how it may be treated  Discuss treatment options with your healthcare providers to decide what care you want to receive  You always have the right to refuse treatment  The above information is an  only  It is not intended as medical advice for individual conditions or treatments  Talk to your doctor, nurse or pharmacist before following any medical regimen to see if it is safe and effective for you  © Copyright [employer] 2022 Information is for End User's use only and may not be sold, redistributed or otherwise used for commercial purposes   All illustrations and images included in CareNotes® are the copyrighted property of [employer]  or [address] [place] [address]

## 2022-11-08 NOTE — ED PROVIDER NOTES
History  Chief Complaint   Patient presents with   • Abdominal Pain     Patient c/o "all over" abdominal pain starting yesterday  Denies CP, SOB, N/V  Patient is around 13 weeks pregnant  Denies vaginal bleeding  Patient states having urinary frequency  54-year-old female presents for evaluation of generalized abdominal pain  Patient is a , she believes she is around 13 weeks pregnant  As far as patient knows pregnancy is progressing normally, she has been suffering from nausea vomiting episodes although states this is starting to remit  Starting last night patient developed generalized abdominal pain, she is uncertain what may have started the pain  Pain feels like a cramping sensation in her lower abdomen, she has a difficult time describing the other pain  She does report pain is worse when she is upright for long periods of time, when she is in a reclined position it tends to remit  Patient reports not feeling this with her previous pregnancies  She denies any vaginal bleeding or discharge  She further denies dysuria, diarrhea  Patient has suffer from constipation throughout this pregnancy  She denies fevers or chills, chest pain or dyspnea  She took a Tylenol last night without relief in symptoms  Prior to Admission Medications   Prescriptions Last Dose Informant Patient Reported? Taking?    Prenatal w/o A Vit-Fe Fum-FA (PRENATA PO)  Self Yes No   Sig: Take by mouth   Pyridoxine HCl (vitamin B-6) 25 MG tablet  Self No No   Sig: Take 1 tablet (25 mg total) by mouth daily   omeprazole (PriLOSEC) 20 mg delayed release capsule   Yes No   Sig: Take 20 mg by mouth daily   Patient not taking: No sig reported      Facility-Administered Medications: None       Past Medical History:   Diagnosis Date   • Polycystic ovary syndrome    • Sickle cell trait (HCC)        Past Surgical History:   Procedure Laterality Date   • CHOLECYSTECTOMY         Family History   Problem Relation Age of Onset   • Diabetes Mother    • Hypertension Mother    • Kidney disease Mother    • Diabetes Father    • Hypertension Father    • No Known Problems Daughter    • No Known Problems Son    • Diabetes Maternal Grandmother    • Alzheimer's disease Maternal Grandfather    • Liver cancer Paternal Grandmother    • Breast cancer Neg Hx    • Colon cancer Neg Hx    • Ovarian cancer Neg Hx      I have reviewed and agree with the history as documented  E-Cigarette/Vaping   • E-Cigarette Use Never User      E-Cigarette/Vaping Substances     Social History     Tobacco Use   • Smoking status: Never Smoker   • Smokeless tobacco: Never Used   Vaping Use   • Vaping Use: Never used   Substance Use Topics   • Alcohol use: Not Currently   • Drug use: Never       Review of Systems   Constitutional: Negative for activity change, appetite change, chills and fever  Respiratory: Negative for shortness of breath  Cardiovascular: Negative for chest pain  Gastrointestinal: Positive for abdominal pain and constipation  Negative for diarrhea, nausea and vomiting  Genitourinary: Negative for dysuria, vaginal bleeding and vaginal discharge  All other systems reviewed and are negative  Physical Exam  Physical Exam  Vitals reviewed  Constitutional:       General: She is not in acute distress  Appearance: She is well-developed  She is not ill-appearing, toxic-appearing or diaphoretic  HENT:      Head: Normocephalic and atraumatic  Right Ear: External ear normal       Left Ear: External ear normal       Mouth/Throat:      Mouth: Mucous membranes are moist    Eyes:      General:         Right eye: No discharge  Left eye: No discharge  Extraocular Movements: Extraocular movements intact  Cardiovascular:      Rate and Rhythm: Normal rate and regular rhythm  Pulmonary:      Effort: Pulmonary effort is normal  No respiratory distress  Abdominal:      General: Bowel sounds are normal  There is no distension  Tenderness: There is no abdominal tenderness  There is no guarding or rebound  Musculoskeletal:         General: No deformity or signs of injury  Right lower leg: No edema  Left lower leg: No edema  Skin:     General: Skin is warm  Coloration: Skin is not jaundiced or pale  Neurological:      General: No focal deficit present  Mental Status: She is alert  Mental status is at baseline        Gait: Gait normal          Vital Signs  ED Triage Vitals [11/08/22 1507]   Temperature Pulse Respirations Blood Pressure SpO2   (!) 97 3 °F (36 3 °C) 92 16 134/63 100 %      Temp Source Heart Rate Source Patient Position - Orthostatic VS BP Location FiO2 (%)   Temporal Monitor Lying -- --      Pain Score       6           Vitals:    11/08/22 1507 11/08/22 1630   BP: 134/63 106/64   Pulse: 92 85   Patient Position - Orthostatic VS: Lying Lying         Visual Acuity      ED Medications  Medications   aluminum-magnesium hydroxide-simethicone (MYLANTA) oral suspension 30 mL (30 mL Oral Given 11/8/22 1556)       Diagnostic Studies  Results Reviewed     Procedure Component Value Units Date/Time    Urine Microscopic [[id number]]  (Normal) Collected: 11/08/22 1532    Lab Status: Final result Specimen: Urine, Clean Catch Updated: 11/08/22 1634     RBC, UA None Seen /hpf      WBC, UA 1-2 /hpf      Epithelial Cells Occasional /hpf      Bacteria, UA None Seen /hpf     Basic metabolic panel [[id number]]  (Abnormal) Collected: 11/08/22 1532    Lab Status: Final result Specimen: Blood from Arm, Right Updated: 11/08/22 1556     Sodium 136 mmol/L      Potassium 3 4 mmol/L      Chloride 102 mmol/L      CO2 24 mmol/L      ANION GAP 10 mmol/L      BUN 8 mg/dL      Creatinine 0 64 mg/dL      Glucose 85 mg/dL      Calcium 9 3 mg/dL      eGFR 113 ml/min/1 73sq m     Narrative:      [place] guidelines for Chronic Kidney Disease (CKD):   •  Stage 1 with normal or high GFR (GFR > 90 mL/min/1 73 square meters)  •  Stage 2 Mild CKD (GFR = 60-89 mL/min/1 73 square meters)  •  Stage 3A Moderate CKD (GFR = 45-59 mL/min/1 73 square meters)  •  Stage 3B Moderate CKD (GFR = 30-44 mL/min/1 73 square meters)  •  Stage 4 Severe CKD (GFR = 15-29 mL/min/1 73 square meters)  •  Stage 5 End Stage CKD (GFR <15 mL/min/1 73 square meters)  Note: GFR calculation is accurate only with a steady state creatinine    Lipase [[id number]]  (Normal) Collected: 11/08/22 1532    Lab Status: Final result Specimen: Blood from Arm, Right Updated: 11/08/22 1556     Lipase 82 u/L     UA (URINE) with reflex to Scope [[id number]]  (Abnormal) Collected: 11/08/22 1532    Lab Status: Final result Specimen: Urine, Clean Catch Updated: 11/08/22 1543     Color, UA Yellow     Clarity, UA Clear     Specific Gravity, UA <=1 005     pH, UA 6 0     Leukocytes, UA Trace     Nitrite, UA Negative     Protein, UA Negative mg/dl      Glucose, UA Negative mg/dl      Ketones, UA Negative mg/dl      Urobilinogen, UA 0 2 E U /dl      Bilirubin, UA Negative     Occult Blood, UA Negative    CBC and differential [[id number]]  (Abnormal) Collected: 11/08/22 1532    Lab Status: Final result Specimen: Blood from Arm, Right Updated: 11/08/22 1539     WBC 9 68 Thousand/uL      RBC 4 72 Million/uL      Hemoglobin 12 6 g/dL      Hematocrit 37 9 %      MCV 80 fL      MCH 26 7 pg      MCHC 33 2 g/dL      RDW 14 1 %      MPV 10 9 fL      Platelets 680 Thousands/uL      nRBC 0 /100 WBCs      Neutrophils Relative 69 %      Immat GRANS % 0 %      Lymphocytes Relative 22 %      Monocytes Relative 6 %      Eosinophils Relative 3 %      Basophils Relative 0 %      Neutrophils Absolute 6 64 Thousands/µL      Immature Grans Absolute 0 02 Thousand/uL      Lymphocytes Absolute 2 14 Thousands/µL      Monocytes Absolute 0 56 Thousand/µL      Eosinophils Absolute 0 28 Thousand/µL      Basophils Absolute 0 04 Thousands/µL                  No orders to display              Procedures  Procedures ED Course  ED Course as of 11/08/22 1838   Tue Nov 08, 2022   1506 16w6d via LMP in  EMR   1519 Blood Pressure: 134/63   1519 Pulse: 92   1519 Respirations: 16   1519 SpO2: 100 %   1550 Leukocytes, UA(!): Trace   1639 Bacteria, UA: None Seen   1640 Epithelial Cells: Occasional  Likely source of leuks                                             MDM  Number of Diagnoses or Management Options  Generalized abdominal discomfort  Diagnosis management comments: 78-year-old female presents for evaluation of generalized abdominal tenderness, patient is presently about 13 weeks pregnant  Fetal heart tones are reassuring on arrival   Abdomen is generally soft, nontender  Patient likely experiencing pain related to abdominal and ureteral stretch  Patient notes previous cholecystectomy, will obtain abdominal labs, urinalysis  Disposition  Final diagnoses:   Generalized abdominal discomfort     Time reflects when diagnosis was documented in both MDM as applicable and the Disposition within this note     Time User Action Codes Description Comment    11/8/2022  4:40 PM Erika Cordoba Add [R10 84] Generalized abdominal discomfort       ED Disposition     ED Disposition   Discharge    Condition   Stable    Date/Time   Tue Nov 8, 2022  4:40 PM    Comment   Guadalupe Jeans discharge to home/self care                 Follow-up Information     Follow up With Specialties Details Why Contact Info Additional [address], [place] Obstetrics and Gynecology Call   [address] [address] [facility] D[facility]G SIL[facility] [address] [place] [address], [id number],           Discharge Medication List as of 11/8/2022  4:42 PM      CONTINUE these medications which have NOT CHANGED    Details   omeprazole (PriLOSEC) 20 mg delayed release capsule Take 20 mg by mouth daily, Historical Med      Prenatal w/o A Vit-Fe Fum-FA (PRENATA PO) Take by mouth, Historical Med      Pyridoxine HCl (vitamin B-6) 25 MG tablet Take 1 tablet (25 mg total) by mouth daily, Starting Tue 9/27/2022, Normal             No discharge procedures on file      PDMP Review       Value Time User    PDMP Reviewed  Yes 10/13/2021  3:14 PM Cassel SHRUTI Cortez          ED Provider  Electronically Signed by           Thalia Gonzalez DO  11/08/22 7376

## 2022-11-15 ENCOUNTER — VBI (OUTPATIENT)
Dept: ADMINISTRATIVE | Facility: OTHER | Age: 37
End: 2022-11-15

## 2022-11-28 ENCOUNTER — TELEPHONE (OUTPATIENT)
Dept: NEUROSURGERY | Facility: CLINIC | Age: 37
End: 2022-11-28

## 2022-11-28 ENCOUNTER — ROUTINE PRENATAL (OUTPATIENT)
Dept: OBGYN CLINIC | Facility: CLINIC | Age: 37
End: 2022-11-28

## 2022-11-28 ENCOUNTER — APPOINTMENT (OUTPATIENT)
Dept: LAB | Facility: MEDICAL CENTER | Age: 37
End: 2022-11-28

## 2022-11-28 VITALS
WEIGHT: 197.53 LBS | SYSTOLIC BLOOD PRESSURE: 108 MMHG | DIASTOLIC BLOOD PRESSURE: 67 MMHG | BODY MASS INDEX: 31.88 KG/M2

## 2022-11-28 DIAGNOSIS — D57.3 SICKLE CELL TRAIT (HCC): ICD-10-CM

## 2022-11-28 DIAGNOSIS — E66.9 OBESITY (BMI 30.0-34.9): ICD-10-CM

## 2022-11-28 DIAGNOSIS — O99.019 SICKLE CELL TRAIT IN MOTHER AFFECTING PREGNANCY (HCC): Primary | ICD-10-CM

## 2022-11-28 DIAGNOSIS — Z71.85 VACCINE COUNSELING: ICD-10-CM

## 2022-11-28 DIAGNOSIS — Z3A.16 16 WEEKS GESTATION OF PREGNANCY: ICD-10-CM

## 2022-11-28 DIAGNOSIS — B37.31 VAGINAL CANDIDIASIS: ICD-10-CM

## 2022-11-28 DIAGNOSIS — D57.3 SICKLE CELL TRAIT IN MOTHER AFFECTING PREGNANCY (HCC): ICD-10-CM

## 2022-11-28 DIAGNOSIS — O99.019 SICKLE CELL TRAIT IN MOTHER AFFECTING PREGNANCY (HCC): ICD-10-CM

## 2022-11-28 DIAGNOSIS — D57.3 SICKLE CELL TRAIT IN MOTHER AFFECTING PREGNANCY (HCC): Primary | ICD-10-CM

## 2022-11-28 DIAGNOSIS — O09.521 MULTIGRAVIDA OF ADVANCED MATERNAL AGE IN FIRST TRIMESTER: ICD-10-CM

## 2022-11-28 LAB
BV WHIFF TEST VAG QL: ABNORMAL
CLUE CELLS SPEC QL WET PREP: ABNORMAL
PH SMN: NORMAL [PH]
SL AMB POCT WET MOUNT: ABNORMAL
T VAGINALIS VAG QL WET PREP: ABNORMAL
YEAST VAG QL WET PREP: PRESENT

## 2022-11-28 NOTE — PROGRESS NOTES
Routine Prenatal Visit  [facility] [address] [place], [place]    Assessment/Plan:  Kimbelry Dunham is a 44y o  year old [id number] at 16w2d who presents for routine prenatal visit  1  Sickle cell trait in mother affecting pregnancy ([address] )  -     Alpha fetoprotein, maternal; Future; Expected date: 2022    2  Multigravida of advanced maternal age in first trimester    3  Vaccine counseling    4  Sickle cell trait (HCC)    5  Obesity (BMI 30 0-34 9)    6  16 weeks gestation of pregnancy  -     Alpha fetoprotein, maternal; Future; Expected date: 2022          Subjective:     CC: Prenatal care    Judson Osorio is a 44 y o  [id number] female who presents for routine prenatal care at 16w2d  Pregnancy ROS: Denies leakage of fluid, pelvic pain, or vaginal bleeding  Reports normal fetal movement  The following portions of the patient's history were reviewed and updated as appropriate: allergies, current medications, past family history, past medical history, obstetric history, gynecologic history, past social history, past surgical history and problem list       Objective:  /67   Wt 89 6 kg (197 lb 8 5 oz)   LMP 2022   BMI 31 88 kg/m²   Pregravid Weight/BMI: Pregravid weight not on file (BMI Could not be calculated)  Current Weight: 89 6 kg (197 lb 8 5 oz)   Total Weight Gain: Not found  Pre-Magaly Vitals    Flowsheet Row Most Recent Value   Prenatal Assessment    Fetal Heart Rate 158   Prenatal Vitals    Blood Pressure 108/67   Weight - Scale 89 6 kg (197 lb 8 5 oz)   Urine Albumin/Glucose    Dilation/Effacement/Station    Vaginal Drainage    Edema    LLE Edema None   RLE Edema None   Facial Edema None           General: Well appearing, no distress  Respiratory: Unlabored breathing  Cardiovascular: Regular rate  Abdomen: Soft, gravid, nontender  Fundal Height: Appropriate for gestational age  Extremities: Warm and well perfused  Non tender    : Thick green discharge  Wet mount with many hyphae      Petra Sharif MD  [address] [place] [address]  11/28/2022 10:35 AM

## 2022-11-29 ENCOUNTER — OFFICE VISIT (OUTPATIENT)
Dept: NEUROSURGERY | Facility: CLINIC | Age: 37
End: 2022-11-29

## 2022-11-29 VITALS
HEART RATE: 63 BPM | TEMPERATURE: 97.6 F | SYSTOLIC BLOOD PRESSURE: 88 MMHG | WEIGHT: 197 LBS | DIASTOLIC BLOOD PRESSURE: 64 MMHG | RESPIRATION RATE: 14 BRPM | HEIGHT: 66 IN | BODY MASS INDEX: 31.66 KG/M2

## 2022-11-29 DIAGNOSIS — M54.12 RADICULOPATHY, CERVICAL: Primary | ICD-10-CM

## 2022-11-29 NOTE — PROGRESS NOTES
Neurosurgery Office Note  Mali June 44 y o  female MRN: [id number]      Assessment/Plan        Problem List Items Addressed This Visit      Visit Diagnoses     Radiculopathy, cervical    -  Primary            Discussion:  Patient was previously evaluated on 5/17/22  She is a 50-year-old female with h/o sickle cell trait who p/w several months of neck pain (“always feels stiff”) and intermittent bilateral arm pain (initially left > right and now "right side is catching up"), diffusely throughout both arms especially wrists, initially started only as paresthesias then delayed pain  No persistent numbness, weakness, gait imbalance, find motor dysfunction in bilateral hands, bowel/bladder changes, recent trauma, prior spinal surgery  I prescribed Robaxin and Neurontin after last visit, but she has not been taking  Of note, she is currently pregnant and has visited ED multiple times recently for abdominal pain (ultrasounds normal, no OB/GYN concerns)  Has not yet worked with PT aggressively  Initial evaluation by Dr Janes Vaughan (Pain Medicine) on 11/8, no interventions  Not taking AC  Non-smoker  MRI on 5/3/22 showed partial disc collapse and disc herniation at C5/6 a/w L>R foraminal stenosis without significant cord compression or cord signal changes  She remains neurologically intact and stable on exam without long tract signs  At this time, given stable intact exam and current pregnancy, I recommend conservative therapies per PT and Pain Medicine  I encouraged her to start taking the medications I prescribed  Again, I explained that no emergent or urgent neurosurgical intervention is indicated  After completion of her pregnancy and if conservative therapies (including KOSTA) fail to improve her symptoms, then she may return to clinic for re-evaluation  All questions and concerns were addressed during this visit          CHIEF COMPLAINT    Chief Complaint   Patient presents with   • Follow-up     6 months f/u--Paresthesia and pain of both upper extremities       HISTORY    History of Present Illness     44y o  year old female     HPI    See Discussion    REVIEW OF SYSTEMS    Review of Systems   Constitutional: Negative  HENT: Negative  Eyes: Negative  Respiratory: Negative  Cardiovascular: Negative  Gastrointestinal: Negative  Endocrine: Negative  Genitourinary: Negative  Musculoskeletal: Positive for gait problem, neck pain (for about 1 month-center neck pain into bi/arm into thumb and middle to pinky in left/right arm to thumb and pinky, left is worse) and neck stiffness  6 months f/u--Paresthesia and pain of both upper extremities- Phy therapy/ pain man    Robaxin or Neurontin ( any relief?)    PT last ov 9/7/22  PM consult 11/8/22    pt is pregnant    Non smoker   Neurological: Positive for weakness (weakness in bi/arm-hands, left is worse), numbness (bi/arm and hands, more so in left) and headaches (starting in the occipital, at times left eye pressure)  Psychiatric/Behavioral: Negative  Meds/Allergies     Current Outpatient Medications   Medication Sig Dispense Refill   • miconazole (MONISTAT 7) 100 mg vaginal suppository Insert 1 suppository (100 mg total) into the vagina daily at bedtime 7 suppository 0   • omeprazole (PriLOSEC) 20 mg delayed release capsule Take 20 mg by mouth daily     • Prenatal w/o A Vit-Fe Fum-FA (PRENATA PO) Take by mouth     • Pyridoxine HCl (vitamin B-6) 25 MG tablet Take 1 tablet (25 mg total) by mouth daily 60 tablet 1     No current facility-administered medications for this visit         Allergies   Allergen Reactions   • Shellfish Allergy - Food Allergy Hives       PAST HISTORY    Past Medical History:   Diagnosis Date   • Polycystic ovary syndrome    • Sickle cell trait ([facility] [address] [address] )        Past Surgical History:   Procedure Laterality Date   • CHOLECYSTECTOMY         Social History     Tobacco Use   • Smoking status: Never   • Smokeless tobacco: Never   Vaping Use   • Vaping Use: Never used   Substance Use Topics   • Alcohol use: Not Currently   • Drug use: Never       Family History   Problem Relation Age of Onset   • Diabetes Mother    • Hypertension Mother    • Kidney disease Mother    • Diabetes Father    • Hypertension Father    • No Known Problems Daughter    • No Known Problems Son    • Diabetes Maternal Grandmother    • Alzheimer's disease Maternal Grandfather    • Liver cancer Paternal Grandmother    • Breast cancer Neg Hx    • Colon cancer Neg Hx    • Ovarian cancer Neg Hx          The following portions of the patient's history were reviewed in this encounter and updated as appropriate: Past medical, surgical, family, and social history, as well as medications, allergies, and review of systems  EXAM    Vitals:Blood pressure (!) 88/64, pulse 63, temperature 97 6 °F (36 4 °C), temperature source Temporal, resp  rate 14, height 5' 6" (1 676 m), weight 89 4 kg (197 lb), last menstrual period 07/13/2022, unknown if currently breastfeeding  ,There is no height or weight on file to calculate BMI  Physical Exam  Constitutional:       Appearance: Normal appearance  HENT:      Head: Normocephalic and atraumatic  Eyes:      Extraocular Movements: Extraocular movements intact and EOM normal       Pupils: Pupils are equal, round, and reactive to light  Cardiovascular:      Rate and Rhythm: Normal rate and regular rhythm  Pulses: Normal pulses  Heart sounds: Normal heart sounds  Pulmonary:      Effort: Pulmonary effort is normal       Breath sounds: Normal breath sounds  Abdominal:      General: Abdomen is flat  Palpations: Abdomen is soft  Musculoskeletal:         General: Normal range of motion  Cervical back: Normal range of motion  Skin:     General: Skin is warm  Neurological:      General: No focal deficit present        Mental Status: She is alert and oriented to person, place, and time  Mental status is at baseline  Motor: Motor strength is normal       Gait: Gait is intact  Psychiatric:         Mood and Affect: Mood normal          Speech: Speech normal          Behavior: Behavior normal          Neurologic Exam     Mental Status   Oriented to person, place, and time  Attention: normal    Speech: speech is normal   Level of consciousness: alert    Cranial Nerves     CN II   Visual fields full to confrontation  Visual acuity: normal  Right visual field deficit: none  Left visual field deficit: none     CN III, IV, VI   Pupils are equal, round, and reactive to light  Extraocular motions are normal    CN III: no CN III palsy  CN VI: no CN VI palsy  Nystagmus: none   Diplopia: none  Ophthalmoparesis: none  Upgaze: normal  Downgaze: normal  Conjugate gaze: present    CN V   Facial sensation intact  Right facial sensation deficit: none  Left facial sensation deficit: none    CN VII   Facial expression full, symmetric  Right facial weakness: none  Left facial weakness: none    CN VIII   CN VIII normal      CN IX, X   CN IX normal    CN X normal    Palate: symmetric    CN XI   CN XI normal    Right sternocleidomastoid strength: normal  Left sternocleidomastoid strength: normal  Right trapezius strength: normal  Left trapezius strength: normal    CN XII   CN XII normal    Tongue deviation: none    Motor Exam   Muscle bulk: normal  Overall muscle tone: normal  Right arm pronator drift: absent  Left arm pronator drift: absent    Strength   Strength 5/5 throughout  Sensory Exam   Light touch normal      Gait, Coordination, and Reflexes     Gait  Gait: normal    Reflexes   Reflexes 2+ except as noted  MEDICAL DECISION MAKING    Imaging Studies:     No results found  I have personally reviewed pertinent films in [place] [facility]    Neurosurgeon

## 2022-11-30 LAB
2ND TRIMESTER 4 SCREEN SERPL-IMP: NORMAL
AFP ADJ MOM SERPL: 1.91
AFP INTERP AMN-IMP: NORMAL
AFP INTERP SERPL-IMP: NORMAL
AFP INTERP SERPL-IMP: NORMAL
AFP SERPL-MCNC: 58.5 NG/ML
AGE AT DELIVERY: 38.9 YR
GA METHOD: NORMAL
GA: 16.3 WEEKS
IDDM PATIENT QL: NO
MULTIPLE PREGNANCY: NO
NEURAL TUBE DEFECT RISK FETUS: 987 %

## 2022-12-14 ENCOUNTER — OFFICE VISIT (OUTPATIENT)
Dept: URGENT CARE | Facility: CLINIC | Age: 37
End: 2022-12-14

## 2022-12-14 VITALS
SYSTOLIC BLOOD PRESSURE: 126 MMHG | OXYGEN SATURATION: 98 % | DIASTOLIC BLOOD PRESSURE: 62 MMHG | RESPIRATION RATE: 19 BRPM | HEART RATE: 112 BPM | TEMPERATURE: 98.4 F

## 2022-12-14 DIAGNOSIS — R50.9 FEVER, UNSPECIFIED FEVER CAUSE: Primary | ICD-10-CM

## 2022-12-14 DIAGNOSIS — R11.0 NAUSEA: ICD-10-CM

## 2022-12-14 RX ORDER — METOCLOPRAMIDE 5 MG/1
5 TABLET ORAL 4 TIMES DAILY PRN
Qty: 30 TABLET | Refills: 0 | Status: SHIPPED | OUTPATIENT
Start: 2022-12-14

## 2022-12-14 RX ORDER — ONDANSETRON 4 MG/1
4 TABLET, FILM COATED ORAL EVERY 8 HOURS PRN
Qty: 20 TABLET | Refills: 0 | Status: SHIPPED | OUTPATIENT
Start: 2022-12-14 | End: 2022-12-14 | Stop reason: ALTCHOICE

## 2022-12-14 NOTE — PROGRESS NOTES
[facility]        NAME: Brett Carlin is a 44 y o  female  : 1985    MRN: [id number]  DATE: 2022  TIME: 11:09 AM    Assessment and Plan   Fever, unspecified fever cause [R50 9]  1  Fever, unspecified fever cause  Covid/Flu-Office Collect      2  Nausea  Covid/Flu-Office Collect    metoclopramide (REGLAN) 5 mg tablet    DISCONTINUED: ondansetron (ZOFRAN) 4 mg tablet        Symptoms are consistent with viral illness such as influenza  Will test   Discussed Tamiflu with patient, patient declines at this time  We will send Reglan to the pharmacy given patient 18 weeks pregnant and complains of severe nausea    Patient Instructions     You are being tested for flu  The test results will return in approximately 24 to 48 hours  They will be available immediately via [employer]  I did send Reglan to the pharmacy for nausea you may take this up to 4 times a day as needed  Make sure that you are drinking plenty of fluids if you continue with severe nausea and are unable to keep fluids down proceed to the ER  Call your OB and inform of results if positive  Get your list that was provided to you by your OB office for medications that are safe to take in pregnancy  You may use hot tea as well as a humidifier to help with your symptoms  You may use throat lozenges and cough drops to help with your sore throat and cough  The cough may linger for 3 to 4 weeks which should get better not worse  You may do chicken noodle soup as well as pasteurized honey  Follow up with PCP in 3-5 days  Proceed to  ER if symptoms worsen      Chief Complaint     Chief Complaint   Patient presents with   • Headache   • Cold Like Symptoms   • Cough   • Vomiting     18 weeks pregnant  Started yesterday, cough, headache, chills, vomiting  No earache, or diarrhea  Poor po intake, and poor appetite  OTC tylenol         History of Present Illness       Patient is a 44year old female who presents to the office today for headache, cough, nausea, 1 episode of vomiting chills, myalgia that started yesterday afternoon  Worse this morning  Is 18 weeks pregnant  Does not work, denies sick contacts at home  Took 2 tylenol yesterday and tea  Did not call OB  Does have a list from OB at home  Patient is drinking 2 cups of water in the exam room      Review of Systems   Review of Systems   Constitutional: Positive for chills, fatigue and fever  HENT: Positive for congestion  Negative for rhinorrhea, sinus pressure, sinus pain, sore throat and tinnitus  Respiratory: Positive for cough  Negative for shortness of breath and wheezing  Cardiovascular: Negative for chest pain and palpitations  Gastrointestinal: Positive for nausea and vomiting  Negative for diarrhea  Musculoskeletal: Positive for myalgias  Neurological: Positive for headaches  All other systems reviewed and are negative          Current Medications       Current Outpatient Medications:   •  metoclopramide (REGLAN) 5 mg tablet, Take 1 tablet (5 mg total) by mouth 4 (four) times a day as needed (nausea), Disp: 30 tablet, Rfl: 0  •  omeprazole (PriLOSEC) 20 mg delayed release capsule, Take 20 mg by mouth daily as needed PRN, Disp: , Rfl:   •  Prenatal w/o A Vit-Fe Fum-FA (PRENATA PO), Take by mouth, Disp: , Rfl:   •  Pyridoxine HCl (vitamin B-6) 25 MG tablet, Take 1 tablet (25 mg total) by mouth daily, Disp: 60 tablet, Rfl: 1  •  miconazole (MONISTAT 7) 100 mg vaginal suppository, Insert 1 suppository (100 mg total) into the vagina daily at bedtime (Patient not taking: Reported on 11/29/2022), Disp: 7 suppository, Rfl: 0    Current Allergies     Allergies as of 12/14/2022 - Reviewed 12/14/2022   Allergen Reaction Noted   • Shellfish allergy - food allergy Hives 03/11/2021            The following portions of the patient's history were reviewed and updated as appropriate: allergies, current medications, past family history, past medical history, past social history, past surgical history and problem list      Past Medical History:   Diagnosis Date   • Polycystic ovary syndrome    • Sickle cell trait ([address] )        Past Surgical History:   Procedure Laterality Date   • CHOLECYSTECTOMY         Family History   Problem Relation Age of Onset   • Diabetes Mother    • Hypertension Mother    • Kidney disease Mother    • Diabetes Father    • Hypertension Father    • No Known Problems Daughter    • No Known Problems Son    • Diabetes Maternal Grandmother    • Alzheimer's disease Maternal Grandfather    • Liver cancer Paternal Grandmother    • Breast cancer Neg Hx    • Colon cancer Neg Hx    • Ovarian cancer Neg Hx          Medications have been verified  Objective   /62   Pulse (!) 112   Temp 98 4 °F (36 9 °C)   Resp 19   LMP 07/13/2022   SpO2 98%        Physical Exam     Physical Exam  Vitals and nursing note reviewed  Constitutional:       General: She is not in acute distress  Appearance: Normal appearance  She is normal weight  She is not ill-appearing or toxic-appearing  HENT:      Right Ear: Tympanic membrane normal       Left Ear: Tympanic membrane normal       Mouth/Throat:      Mouth: Mucous membranes are moist       Pharynx: Posterior oropharyngeal erythema (Posterior pharynx with postnasal drip no tonsillar swelling or exudate noted) present  Cardiovascular:      Rate and Rhythm: Regular rhythm  Tachycardia present  Pulses: Normal pulses  Heart sounds: Normal heart sounds  Pulmonary:      Effort: Pulmonary effort is normal       Breath sounds: Normal breath sounds  Skin:     General: Skin is warm  Capillary Refill: Capillary refill takes less than 2 seconds  Neurological:      General: No focal deficit present  Mental Status: She is alert and oriented to person, place, and time

## 2022-12-14 NOTE — PATIENT INSTRUCTIONS
You are being tested for flu  The test results will return in approximately 24 to 48 hours  They will be available immediately via [employer]  I did send Reglan to the pharmacy for nausea you may take this up to 4 times a day as needed  Make sure that you are drinking plenty of fluids if you continue with severe nausea and are unable to keep fluids down proceed to the ER  Call your OB and inform of results if positive  Get your list that was provided to you by your OB office for medications that are safe to take in pregnancy  You may use hot tea as well as a humidifier to help with your symptoms  You may use throat lozenges and cough drops to help with your sore throat and cough  The cough may linger for 3 to 4 weeks which should get better not worse  You may do chicken noodle soup as well as pasteurized honey

## 2022-12-15 LAB
FLUAV RNA RESP QL NAA+PROBE: POSITIVE
FLUBV RNA RESP QL NAA+PROBE: NEGATIVE
SARS-COV-2 RNA RESP QL NAA+PROBE: NEGATIVE

## 2022-12-29 ENCOUNTER — ROUTINE PRENATAL (OUTPATIENT)
Dept: OBGYN CLINIC | Facility: CLINIC | Age: 37
End: 2022-12-29

## 2022-12-29 VITALS — BODY MASS INDEX: 32.96 KG/M2 | WEIGHT: 204.2 LBS | DIASTOLIC BLOOD PRESSURE: 64 MMHG | SYSTOLIC BLOOD PRESSURE: 106 MMHG

## 2022-12-29 DIAGNOSIS — J01.00 ACUTE NON-RECURRENT MAXILLARY SINUSITIS: ICD-10-CM

## 2022-12-29 DIAGNOSIS — Z3A.20 20 WEEKS GESTATION OF PREGNANCY: ICD-10-CM

## 2022-12-29 DIAGNOSIS — O99.019 SICKLE CELL TRAIT IN MOTHER AFFECTING PREGNANCY (HCC): ICD-10-CM

## 2022-12-29 DIAGNOSIS — Z34.92 SECOND TRIMESTER PREGNANCY: Primary | ICD-10-CM

## 2022-12-29 DIAGNOSIS — D57.3 SICKLE CELL TRAIT IN MOTHER AFFECTING PREGNANCY (HCC): ICD-10-CM

## 2022-12-29 DIAGNOSIS — O09.522 MULTIGRAVIDA OF ADVANCED MATERNAL AGE IN SECOND TRIMESTER: ICD-10-CM

## 2022-12-29 RX ORDER — AZITHROMYCIN 250 MG/1
TABLET, FILM COATED ORAL
Qty: 6 TABLET | Refills: 0 | Status: SHIPPED | OUTPATIENT
Start: 2022-12-29 | End: 2023-01-03 | Stop reason: ALTCHOICE

## 2022-12-29 RX ORDER — GUAIFENESIN DEXTROMETHORPHAN HYDROBROMIDE ORAL SOLUTION 10; 100 MG/5ML; MG/5ML
5 SOLUTION ORAL EVERY 12 HOURS
Qty: 180 ML | Refills: 0 | Status: SHIPPED | OUTPATIENT
Start: 2022-12-29

## 2022-12-29 NOTE — LETTER
December 29, 2022     Patient: Cherylene Odea  YOB: 1985  Date of Visit: 12/29/2022      Dear Dr Arianne Villalobos:    Thomas Mane is under my professional care for the supervision of her pregnancy  She is currently 20w5d with an ANT of 5/13/23  Lanie Pod was seen in my office on 12/29/2022 and expressed concerns regarding recommendation for Gabapentin use for her cervical radiculopathy  I have reviewed your most recent office note from 11/29/22 with your recommendations  I reviewed with Lanie Pod that Gabapentin currently carries a pregnancy category C classification, which signifies limited data on its risks  We currently have no known harm with this medication use, but evidence remains limited, specifically in terms of long-term risk  I reviewed with Lanie Pod that this classification does not contraindicate its use and that many medications commonly used in pregnancy also carry this classification, but that the expected benefits should outweigh the potential risks  I advised that conservative measures are preferred where able  Muscle relaxing medications are generally safe in pregnancy, although Robaxin carries a category C classification and Flexeril is category B and thus preferred if they are otherwise equivocal for her case  Her OB team remains available should you have questions or concerns regarding medication choices in pregnancy  If you have any questions or concerns, please don't hesitate to call           Sincerely,          Joseluis Dennis MD

## 2022-12-29 NOTE — PROGRESS NOTES
Assessment  44 y o  [id number] at 20w5d presenting for routine prenatal visit  Plan  Diagnoses and all orders for this visit:    Second trimester pregnancy  20 weeks gestation of pregnancy  - Second trimester precautions  - Normal movement  - Level 2 scheduled  - Return in 4wks for PN    Multigravida of advanced maternal age in second trimester  - Follows with MFM  - NIPT/AFP wnl    Sickle cell trait in mother affecting pregnancy ([facility] )  - Post-delivery  testing    Acute non-recurrent maxillary sinusitis  -     azithromycin (ZITHROMAX) 250 mg tablet; Take 2 tablets today then 1 tablet daily x 4 days  -     dextromethorphan-guaiFENesin (ROBITUSSIN-DM)  mg/5 mL oral liquid; Take 5 mL by mouth every 12 (twelve) hours        ____________________________________________________________        Subjective    Jose A Santana is a 44 y o  [id number] at 20w5d who presents for routine prenatal visit  She is recovering from the flu  Positive 2wks prior  Improved initially, and systemic symptoms are resolved  But now feels worse again in terms of sinus pressure and pain  Historically gets sinus infections in similar scenarios  Also very congested with green/yellow sputum  Using robitussin, but not very helpful  She denies contractions, loss of fluid, or vaginal bleeding  She feels regular fetal movements  Reports neurologist would like to start gabapentin for radiculopathy  Reviewed evidence with this med, pregnancy category, and risk/benefit considerations  Pregnancy Problems:  Patient Active Problem List   Diagnosis   • PCOS (polycystic ovarian syndrome)   • Sickle cell trait (HCC)   • Gastroesophageal reflux disease without esophagitis   • Sickle cell trait in mother affecting pregnancy ([facility] )   • Multigravida of advanced maternal age in second trimester   • Vaccine counseling   • Obesity (BMI 30 0-34  9)   • 20 weeks gestation of pregnancy         Objective  /64   Wt 92 6 kg (204 lb 3 2 oz) LMP 07/13/2022   BMI 32 96 kg/m²     FHT: 149 BPM   Uterine Size: size equals dates     Physical Exam:  Physical Exam  Constitutional:       General: She is not in acute distress  Appearance: Normal appearance  She is well-developed  She is not ill-appearing, toxic-appearing or diaphoretic  HENT:      Head: Normocephalic and atraumatic  Eyes:      General: No scleral icterus  Right eye: No discharge  Left eye: No discharge  Conjunctiva/sclera: Conjunctivae normal    Pulmonary:      Effort: Pulmonary effort is normal  No accessory muscle usage or respiratory distress  Abdominal:      General: There is distension (gravid)  Tenderness: There is no abdominal tenderness  There is no guarding or rebound  Skin:     General: Skin is warm and dry  Coloration: Skin is not jaundiced  Findings: No bruising, erythema or rash  Neurological:      Mental Status: She is alert  Psychiatric:         Mood and Affect: Mood normal          Behavior: Behavior normal          Thought Content:  Thought content normal          Judgment: Judgment normal

## 2022-12-30 NOTE — PATIENT INSTRUCTIONS
Patient did not attend 1300 nursing group despite staff encouragement. Thank you for choosing us for your  care today  If you have any questions about your ultrasound or care, please do not hesitate to contact us or your primary obstetrician  Some general instructions for your pregnancy are:    Exercise: Aim for 22 minutes per day (150 minutes per week) of regular exercise  Walking is great! Nutrition: Choose healthy sources of calcium, iron, and protein  Learn about Preeclampsia: preeclampsia is a common, serious high blood pressure complication in pregnancy  A blood pressure of 990LRCC (systolic or top number) or 97UVDK (diastolic or bottom number) is not normal and needs evaluation by your doctor  Aspirin is sometimes prescribed in early pregnancy to prevent preeclampsia in women with risk factors - ask your obstetrician if you should be on this medication  For more resources, visit:  https://mothertobaby org/fact-sheets/low-dose-aspirin/  [employer] com [place]  https://www highriskpregnancyinfo org/preeclampsia  If you smoke, try to reduce how many cigarettes you smoke or try to quit completely  Do not vape  Other warning signs to watch out for in pregnancy or postpartum: chest pain, obstructed breathing or shortness of breath, seizures, thoughts of hurting yourself or your baby, bleeding, a painful or swollen leg, fever, or headache (see [employer] POST-BIRTH Warning Signs campaign)  If these happen call 911  Itching is also not normal in pregnancy and if you experience this, especially over your hands and feet, potentially worse at night, notify your doctors

## 2023-01-03 ENCOUNTER — ROUTINE PRENATAL (OUTPATIENT)
Dept: PERINATAL CARE | Facility: OTHER | Age: 38
End: 2023-01-03

## 2023-01-03 VITALS
SYSTOLIC BLOOD PRESSURE: 94 MMHG | HEIGHT: 66 IN | WEIGHT: 203.6 LBS | DIASTOLIC BLOOD PRESSURE: 62 MMHG | HEART RATE: 90 BPM | BODY MASS INDEX: 32.72 KG/M2

## 2023-01-03 DIAGNOSIS — Z36.86 ENCOUNTER FOR ANTENATAL SCREENING FOR CERVICAL LENGTH: ICD-10-CM

## 2023-01-03 DIAGNOSIS — Z36.3 ENCOUNTER FOR ANTENATAL SCREENING FOR MALFORMATIONS: ICD-10-CM

## 2023-01-03 DIAGNOSIS — O09.522 MULTIGRAVIDA OF ADVANCED MATERNAL AGE IN SECOND TRIMESTER: Primary | ICD-10-CM

## 2023-01-03 DIAGNOSIS — Z3A.21 21 WEEKS GESTATION OF PREGNANCY: ICD-10-CM

## 2023-01-03 DIAGNOSIS — D57.3 SICKLE CELL TRAIT (HCC): ICD-10-CM

## 2023-01-03 NOTE — LETTER
January 3, 2023     Hector Arora MD  [address]    Patient: Joshua Araujo   YOB: 1985   Date of Visit: 1/3/2023       Dear Dr Glen Clark:    Thank you for referring Juani Caceres to me for evaluation  Below are my notes for this consultation  If you have questions, please do not hesitate to call me  I look forward to following your patient along with you  Sincerely,        Rogerio Vieira MD        CC: No Recipients  Rogerio Vieira MD  1/3/2023  1:01 PM  Sign when Signing Visit  Joshua Araujo  has no complaints today at 21w3d  She reports fetal movements and does not report any vaginal bleeding or signs of labor  Her recently completed fetal testing revealed a normal NIPT and MSAFP and normal early Glucola  Problem list:  1  Advanced maternal age  3  History of 2 prior miscarriages  3  Recent flu type A infection 12/14/2022    Ultrasound findings: The ultrasound today shows normal interval fetal growth and fluid, normal cervical length, and no malformations were detected  Pregnancy ultrasound has limitations and is unable to detect all forms of fetal congenital abnormalities  Follow up recommended:   1  Recommend a 32-week growth scan due to her advanced maternal age  2  Encouraged her to complete the flu vaccine  Pre visit time reviewing her records   5 minutes  Face to face time 5 minutes  Post visit time on documentation of note, updating her problem list, adding orders and prescriptions 5 minutes  Procedures that were completed today were charged separately  The level of decision making was straight forward      Rogerio Vieira MD

## 2023-01-03 NOTE — PROGRESS NOTES
Carlitos Calvo  has no complaints today at 21w3d  She reports fetal movements and does not report any vaginal bleeding or signs of labor  Her recently completed fetal testing revealed a normal NIPT and MSAFP and normal early Glucola  Problem list:  1  Advanced maternal age  3  History of 2 prior miscarriages  3  Recent flu type A infection 12/14/2022    Ultrasound findings: The ultrasound today shows normal interval fetal growth and fluid, normal cervical length, and no malformations were detected  Pregnancy ultrasound has limitations and is unable to detect all forms of fetal congenital abnormalities  Follow up recommended:   1  Recommend a 32-week growth scan due to her advanced maternal age  2  Encouraged her to complete the flu vaccine  Pre visit time reviewing her records   5 minutes  Face to face time 5 minutes  Post visit time on documentation of note, updating her problem list, adding orders and prescriptions 5 minutes  Procedures that were completed today were charged separately  The level of decision making was straight forward      Jeremy Brooks MD

## 2023-01-03 NOTE — PROGRESS NOTES
Ultrasound Probe Disinfection    A transvaginal ultrasound was performed  Prior to use, disinfection was performed with High Level Disinfection Process ([employer])  Probe serial number F2: [id number] was used        Lore Monae  01/03/23  11:07 AM

## 2023-01-15 ENCOUNTER — HOSPITAL ENCOUNTER (EMERGENCY)
Facility: HOSPITAL | Age: 38
Discharge: HOME | End: 2023-01-15
Attending: EMERGENCY MEDICINE

## 2023-01-15 VITALS
HEART RATE: 98 BPM | RESPIRATION RATE: 18 BRPM | WEIGHT: 205 LBS | OXYGEN SATURATION: 97 % | SYSTOLIC BLOOD PRESSURE: 111 MMHG | BODY MASS INDEX: 32.95 KG/M2 | TEMPERATURE: 96.9 F | DIASTOLIC BLOOD PRESSURE: 61 MMHG | HEIGHT: 66 IN

## 2023-01-15 DIAGNOSIS — R35.0 URINARY FREQUENCY: Primary | ICD-10-CM

## 2023-01-15 LAB
BILIRUB UR QL STRIP: NEGATIVE
CLARITY UR: CLEAR
COLOR UR: YELLOW
GLUCOSE SERPL-MCNC: 66 MG/DL (ref 65–140)
GLUCOSE UR STRIP-MCNC: NEGATIVE MG/DL
HGB UR QL STRIP.AUTO: NEGATIVE
KETONES UR STRIP-MCNC: NEGATIVE MG/DL
LEUKOCYTE ESTERASE UR QL STRIP: NEGATIVE
NITRITE UR QL STRIP: NEGATIVE
PH UR STRIP.AUTO: 7 [PH]
PROT UR STRIP-MCNC: NEGATIVE MG/DL
SP GR UR STRIP.AUTO: 1.01 (ref 1–1.03)
UROBILINOGEN UR QL STRIP.AUTO: 0.2 E.U./DL

## 2023-01-15 NOTE — ED PROVIDER NOTES
History  Chief Complaint   Patient presents with   • Possible UTI     States she thinks she has a UTI  Has urgency to go all the time  Is approx 25 weeks pregnant      44year old female with PMH PCOS, sickle cell trait, currently 22 weeks pregnant presents for evaluation of possible UTI  Pt notes over the past few days she has developed frequency, urgency and hesitancy of urination  Pt feels she has been voiding small frequent amounts  Denies dysuria, hematuria  Denies abdominal pain  Denies flank or back pain  Denies fever, chills  Denies chest pain, SOB  Reports nausea  Denies V/D/C  She notes stools are sometimes loose  Denies vaginal bleeding or discharge  Has had reports fetal movement  No reported cramping  No reported complications with pregnancy thus far  She notes symptoms are the same as her prior UTI symptoms  She states history of recurrent UTI  No reported aggravating or alleviating factors  No prior evaluation  History provided by:  Patient and medical records   used: No        Prior to Admission Medications   Prescriptions Last Dose Informant Patient Reported? Taking?    Prenatal w/o A Vit-Fe Fum-FA (PRENATA PO)   Yes No   Sig: Take by mouth   dextromethorphan-guaiFENesin (ROBITUSSIN-DM)  mg/5 mL oral liquid   No No   Sig: Take 5 mL by mouth every 12 (twelve) hours   omeprazole (PriLOSEC) 20 mg delayed release capsule   Yes No   Sig: Take 20 mg by mouth daily as needed PRN      Facility-Administered Medications: None       Past Medical History:   Diagnosis Date   • Polycystic ovary syndrome    • Sickle cell trait (HCC)        Past Surgical History:   Procedure Laterality Date   • CHOLECYSTECTOMY         Family History   Problem Relation Age of Onset   • Diabetes Mother    • Hypertension Mother    • Kidney disease Mother    • Diabetes Father    • Hypertension Father    • No Known Problems Daughter    • No Known Problems Son    • Diabetes Maternal Grandmother    • Alzheimer's disease Maternal Grandfather    • Liver cancer Paternal Grandmother    • Breast cancer Neg Hx    • Colon cancer Neg Hx    • Ovarian cancer Neg Hx      I have reviewed and agree with the history as documented  E-Cigarette/Vaping   • E-Cigarette Use Never User      E-Cigarette/Vaping Substances     Social History     Tobacco Use   • Smoking status: Never   • Smokeless tobacco: Never   Vaping Use   • Vaping Use: Never used   Substance Use Topics   • Alcohol use: Not Currently   • Drug use: Never       Review of Systems   Constitutional: Negative  Negative for chills, fatigue and fever  HENT: Negative  Negative for congestion, rhinorrhea and sore throat  Eyes: Negative  Respiratory: Negative  Negative for cough, shortness of breath and wheezing  Cardiovascular: Negative  Negative for chest pain, palpitations and leg swelling  Gastrointestinal: Positive for nausea  Negative for abdominal pain, constipation, diarrhea and vomiting  Genitourinary: Positive for frequency and urgency  Negative for dysuria, flank pain, hematuria, vaginal bleeding and vaginal discharge  Musculoskeletal: Negative  Negative for back pain and myalgias  Skin: Negative  Negative for rash  Neurological: Negative  Negative for dizziness, light-headedness and headaches  Psychiatric/Behavioral: Negative  All other systems reviewed and are negative  Physical Exam  Physical Exam  Vitals and nursing note reviewed  Constitutional:       General: She is awake  She is not in acute distress  Appearance: She is well-developed  She is not toxic-appearing  HENT:      Head: Normocephalic and atraumatic  Right Ear: Hearing and external ear normal       Left Ear: Hearing and external ear normal       Nose: Nose normal       Mouth/Throat:      Mouth: Mucous membranes are moist       Pharynx: Oropharynx is clear  Eyes:      General: Lids are normal  No scleral icterus  Conjunctiva/sclera: Conjunctivae normal    Neck:      Trachea: Trachea and phonation normal  No tracheal deviation  Cardiovascular:      Rate and Rhythm: Normal rate and regular rhythm  Pulses: Normal pulses  Heart sounds: Normal heart sounds, S1 normal and S2 normal  No murmur heard  Pulmonary:      Effort: Pulmonary effort is normal  No tachypnea or respiratory distress  Breath sounds: Normal breath sounds  No wheezing or rhonchi  Abdominal:      General: Bowel sounds are normal       Palpations: Abdomen is soft  Tenderness: There is no abdominal tenderness  There is no right CVA tenderness, left CVA tenderness, guarding or rebound  Comments: +gravid   Musculoskeletal:      Right lower leg: No edema  Left lower leg: No edema  Skin:     General: Skin is warm and dry  Capillary Refill: Capillary refill takes less than 2 seconds  Findings: No rash  Neurological:      Mental Status: She is alert and oriented to person, place, and time  GCS: GCS eye subscore is 4  GCS verbal subscore is 5  GCS motor subscore is 6  Motor: No abnormal muscle tone        Gait: Gait normal    Psychiatric:         Mood and Affect: Mood normal          Speech: Speech normal          Behavior: Behavior normal          Vital Signs  ED Triage Vitals [01/15/23 1115]   Temperature Pulse Respirations Blood Pressure SpO2   (!) 96 9 °F (36 1 °C) 98 18 111/61 97 %      Temp Source Heart Rate Source Patient Position - Orthostatic VS BP Location FiO2 (%)   Tympanic Monitor Sitting Right arm --      Pain Score       9           Vitals:    01/15/23 1115   BP: 111/61   Pulse: 98   Patient Position - Orthostatic VS: Sitting         Visual Acuity      ED Medications  Medications - No data to display    Diagnostic Studies  Results Reviewed     Procedure Component Value Units Date/Time    Fingerstick Glucose (POCT) [[id number]]  (Normal) Collected: 01/15/23 1156    Lab Status: Final result Updated: 01/15/23 1157     POC Glucose 66 mg/dl     UA w Reflex to Microscopic w Reflex to Culture [[id number]] Collected: 01/15/23 1128    Lab Status: Final result Specimen: Urine, Clean Catch Updated: 01/15/23 1142     Color, UA Yellow     Clarity, UA Clear     Specific Gravity, UA 1 010     pH, UA 7 0     Leukocytes, UA Negative     Nitrite, UA Negative     Protein, UA Negative mg/dl      Glucose, UA Negative mg/dl      Ketones, UA Negative mg/dl      Urobilinogen, UA 0 2 E U /dl      Bilirubin, UA Negative     Occult Blood, UA Negative     URINE COMMENT --    Urine culture [[id number]] Collected: 01/15/23 1128    Lab Status: In process Specimen: Urine, Clean Catch Updated: 01/15/23 1142                 No orders to display              Procedures  Procedures         ED Course  ED Course as of 01/15/23 1219   Sun Justo 15, 2023   1143 Fetal heart tones obtained = 147 LLQ  1144 UA w Reflex to Microscopic w Reflex to Culture  Unremarkable; not suggestive of infection   1144 Will obtain fingerstick glucose given urinary frequency   1157 POC Glucose: 66  Within normal range, not c/w diabetes   1200 Pt updated on results  UA not suggestive of UTI/cystitis  Abdomen gravid but nontender  Glucose within acceptable range  Pt afebrile, vitals stable  Pt is not having any symptoms of labor  No vaginal discharge, bleeding or cramping  Doubt other etiologies such as STI or vaginitis in abscess of symptoms  Urinary symptoms of frequency and urgency could be related to increasing size of uterus and pressure it is applying to bladder  Urine culture obtained and pending  I have recommend pt follow up with OB/GYN this week and she is comfortable with plan  Discussed continued symptomatic/supportive care  Advised rest, fluids, OTC meds such as tylenol as needed for symptoms  Urine culture pending, will treat if positive  Strict return precautions outlined      Advised outpatient follow up with OB/GYN or return to ER for change in condition as outlined  Pt verbalized understanding and had no further questions  Medical Decision Making  Urinary frequency: acute illness or injury  Amount and/or Complexity of Data Reviewed  External Data Reviewed: labs and notes  Labs: ordered  Decision-making details documented in ED Course  Risk  OTC drugs  Disposition  Final diagnoses:   Urinary frequency     Time reflects when diagnosis was documented in both MDM as applicable and the Disposition within this note     Time User Action Codes Description Comment    1/15/2023 12:03 PM Nemo Man Add [R35 0] Urinary frequency       ED Disposition     ED Disposition   Discharge    Condition   Stable    Date/Time   Sun Justo 15, 2023 12:03 PM    [address] [facility] discharge to home/self care  Follow-up Information     Follow up With Specialties Details Why Contact Info Additional Information    Please follow up with your OB/GYN this week         [facility] Emergency Department Emergency Medicine  As needed [phone]  [facility] Department, [address], [facility]          Discharge Medication List as of 1/15/2023 12:04 PM      CONTINUE these medications which have NOT CHANGED    Details   dextromethorphan-guaiFENesin (ROBITUSSIN-DM)  mg/5 mL oral liquid Take 5 mL by mouth every 12 (twelve) hours, Starting Thu 12/29/2022, Normal      omeprazole (PriLOSEC) 20 mg delayed release capsule Take 20 mg by mouth daily as needed PRN, Historical Med      Prenatal w/o A Vit-Fe Fum-FA (PRENATA PO) Take by mouth, Historical Med             No discharge procedures on file      PDMP Review       Value Time User    PDMP Reviewed  Yes 10/13/2021  3:14 PM Neel Stoll PA-C          ED Provider  Electronically Signed by           Neel Stoll SHRUTI  01/15/23 1215

## 2023-01-15 NOTE — DISCHARGE INSTRUCTIONS
Follow up with your OB/GYN or return to ER as needed  We will contact you with results of urine culture when available if positive  These will be available in your mychart

## 2023-01-17 LAB — BACTERIA UR CULT: NORMAL

## 2023-01-18 ENCOUNTER — TELEPHONE (OUTPATIENT)
Dept: OBGYN CLINIC | Facility: MEDICAL CENTER | Age: 38
End: 2023-01-18

## 2023-01-18 NOTE — TELEPHONE ENCOUNTER
Pt called in would like to go over labs that were done on 1/15/23 from ER visit   Pt would like a call back to discuss, please review when available thank you

## 2023-01-18 NOTE — TELEPHONE ENCOUNTER
Labs reviewed -- UA, UCx, glucose  Notable for contaminated UCx, which means we cannot adequately determine if she has a UTI or not using this  If symptoms persist, she should be offered treatment (pregnancy is a high risk state for progression of UTI)   If asymptomatic, we can repeat culture to determine need first

## 2023-01-19 ENCOUNTER — TELEPHONE (OUTPATIENT)
Dept: OBGYN CLINIC | Facility: MEDICAL CENTER | Age: 38
End: 2023-01-19

## 2023-01-19 DIAGNOSIS — O23.12 ACUTE CYSTITIS DURING PREGNANCY IN SECOND TRIMESTER: Primary | ICD-10-CM

## 2023-01-19 RX ORDER — NITROFURANTOIN 25; 75 MG/1; MG/1
100 CAPSULE ORAL 2 TIMES DAILY
Qty: 14 CAPSULE | Refills: 0 | Status: SHIPPED | OUTPATIENT
Start: 2023-01-19

## 2023-01-19 NOTE — TELEPHONE ENCOUNTER
Patient called about her test results and is still experiencing pain/discomfort during urinating  Patient feels like when she uses the bathroom she does not fully empty her bladder  She would like medication prescribed to her if possible  Pharmacy on file   Please review when you get a chance

## 2023-01-20 ENCOUNTER — HOSPITAL ENCOUNTER (EMERGENCY)
Facility: HOSPITAL | Age: 38
Discharge: HOME | End: 2023-01-20
Attending: EMERGENCY MEDICINE

## 2023-01-20 ENCOUNTER — APPOINTMENT (EMERGENCY)
Dept: ULTRASOUND IMAGING | Facility: HOSPITAL | Age: 38
End: 2023-01-20

## 2023-01-20 VITALS
SYSTOLIC BLOOD PRESSURE: 119 MMHG | BODY MASS INDEX: 33.11 KG/M2 | TEMPERATURE: 97.8 F | DIASTOLIC BLOOD PRESSURE: 70 MMHG | OXYGEN SATURATION: 97 % | HEIGHT: 66 IN | RESPIRATION RATE: 18 BRPM | HEART RATE: 103 BPM | WEIGHT: 206 LBS

## 2023-01-20 DIAGNOSIS — O26.899 ABDOMINAL PAIN IN PREGNANCY: Primary | ICD-10-CM

## 2023-01-20 DIAGNOSIS — R10.9 ABDOMINAL PAIN IN PREGNANCY: Primary | ICD-10-CM

## 2023-01-20 LAB
ALBUMIN SERPL BCP-MCNC: 3.5 G/DL (ref 3.5–5)
ALP SERPL-CCNC: 71 U/L (ref 34–104)
ALT SERPL W P-5'-P-CCNC: 10 U/L (ref 7–52)
ANION GAP SERPL CALCULATED.3IONS-SCNC: 11 MMOL/L (ref 4–13)
AST SERPL W P-5'-P-CCNC: 18 U/L (ref 13–39)
BASOPHILS # BLD AUTO: 0.02 THOUSANDS/ÂΜL (ref 0–0.1)
BASOPHILS NFR BLD AUTO: 0 % (ref 0–1)
BILIRUB SERPL-MCNC: 0.54 MG/DL (ref 0.2–1)
BILIRUB UR QL STRIP: NEGATIVE
BUN SERPL-MCNC: 6 MG/DL (ref 5–25)
CALCIUM SERPL-MCNC: 8.8 MG/DL (ref 8.4–10.2)
CHLORIDE SERPL-SCNC: 103 MMOL/L (ref 96–108)
CLARITY UR: CLEAR
CO2 SERPL-SCNC: 22 MMOL/L (ref 21–32)
COLOR UR: YELLOW
CREAT SERPL-MCNC: 0.55 MG/DL (ref 0.6–1.3)
EOSINOPHIL # BLD AUTO: 0.06 THOUSAND/ÂΜL (ref 0–0.61)
EOSINOPHIL NFR BLD AUTO: 1 % (ref 0–6)
ERYTHROCYTE [DISTWIDTH] IN BLOOD BY AUTOMATED COUNT: 14.2 % (ref 11.6–15.1)
GFR SERPL CREATININE-BSD FRML MDRD: 119 ML/MIN/1.73SQ M
GLUCOSE SERPL-MCNC: 87 MG/DL (ref 65–140)
GLUCOSE UR STRIP-MCNC: NEGATIVE MG/DL
HCT VFR BLD AUTO: 37.2 % (ref 34.8–46.1)
HGB BLD-MCNC: 12.6 G/DL (ref 11.5–15.4)
HGB UR QL STRIP.AUTO: NEGATIVE
IMM GRANULOCYTES # BLD AUTO: 0.03 THOUSAND/UL (ref 0–0.2)
IMM GRANULOCYTES NFR BLD AUTO: 0 % (ref 0–2)
KETONES UR STRIP-MCNC: ABNORMAL MG/DL
LEUKOCYTE ESTERASE UR QL STRIP: NEGATIVE
LIPASE SERPL-CCNC: 15 U/L (ref 11–82)
LYMPHOCYTES # BLD AUTO: 1.02 THOUSANDS/ÂΜL (ref 0.6–4.47)
LYMPHOCYTES NFR BLD AUTO: 11 % (ref 14–44)
MCH RBC QN AUTO: 27.4 PG (ref 26.8–34.3)
MCHC RBC AUTO-ENTMCNC: 33.9 G/DL (ref 31.4–37.4)
MCV RBC AUTO: 81 FL (ref 82–98)
MONOCYTES # BLD AUTO: 0.29 THOUSAND/ÂΜL (ref 0.17–1.22)
MONOCYTES NFR BLD AUTO: 3 % (ref 4–12)
NEUTROPHILS # BLD AUTO: 7.69 THOUSANDS/ÂΜL (ref 1.85–7.62)
NEUTS SEG NFR BLD AUTO: 85 % (ref 43–75)
NITRITE UR QL STRIP: NEGATIVE
NRBC BLD AUTO-RTO: 0 /100 WBCS
PH UR STRIP.AUTO: 6 [PH]
PLATELET # BLD AUTO: 295 THOUSANDS/UL (ref 149–390)
PMV BLD AUTO: 11.5 FL (ref 8.9–12.7)
POTASSIUM SERPL-SCNC: 4 MMOL/L (ref 3.5–5.3)
PROT SERPL-MCNC: 6.9 G/DL (ref 6.4–8.4)
PROT UR STRIP-MCNC: NEGATIVE MG/DL
RBC # BLD AUTO: 4.6 MILLION/UL (ref 3.81–5.12)
SODIUM SERPL-SCNC: 136 MMOL/L (ref 135–147)
SP GR UR STRIP.AUTO: 1.02 (ref 1–1.03)
UROBILINOGEN UR QL STRIP.AUTO: 0.2 E.U./DL
WBC # BLD AUTO: 9.11 THOUSAND/UL (ref 4.31–10.16)

## 2023-01-20 RX ORDER — ACETAMINOPHEN 325 MG/1
650 TABLET ORAL ONCE
Status: COMPLETED | OUTPATIENT
Start: 2023-01-20 | End: 2023-01-20

## 2023-01-20 RX ORDER — ONDANSETRON 4 MG/1
4 TABLET, ORALLY DISINTEGRATING ORAL ONCE
Status: COMPLETED | OUTPATIENT
Start: 2023-01-20 | End: 2023-01-20

## 2023-01-20 RX ORDER — ONDANSETRON 2 MG/ML
4 INJECTION INTRAMUSCULAR; INTRAVENOUS ONCE
Status: DISCONTINUED | OUTPATIENT
Start: 2023-01-20 | End: 2023-01-20

## 2023-01-20 RX ADMIN — ACETAMINOPHEN 650 MG: 325 TABLET, FILM COATED ORAL at 13:22

## 2023-01-20 RX ADMIN — ONDANSETRON 4 MG: 4 TABLET, ORALLY DISINTEGRATING ORAL at 13:22

## 2023-01-20 NOTE — ED PROVIDER NOTES
History  Chief Complaint   Patient presents with   • Abdominal Pain Pregnant     Patient reports generalized abdominal pain beginning last night  States she is 23 weeks pregnant  Recently started on antibiotics for possible uti  Also reports nausea and vomiting  Denies any vaginal bleeding  Patient presents to the emergency department today for evaluation of abdominal pain and vomiting  This is a very pleasant 25-year-old female who presents via private vehicle  She provides her own history stating she was here few days ago, she had symptoms of what she thought was a urinary tract infection she had incomplete emptying and pressure  Urine dip here was negative therefore not sent home on antibiotics, culture showed mixed contaminants greater than 100,000 colony count she did phone OB/GYN, she started on Macrobid yesterday has taken 2 doses  She states that she began with essentially a generalized abdominal pain  The abdominal pain is somewhat sharp in nature and radiates into the back  She denies any vaginal bleeding or discharge  She states she does not feel as though these are contractions  She has never required RhoGAM    She is  5 para 2, last pregnancy prior to this did result in unfortunate miscarriage  She states she vomited    X2 today it contained food products  No black or red contents  She has reported normal fetal movement  She does follow with [facility]'s OB[facility] and to this point has not had any gestational abnormalities with this pregnancy  Prior to Admission Medications   Prescriptions Last Dose Informant Patient Reported? Taking?    Prenatal w/o A Vit-Fe Fum-FA (PRENATA PO) 2023  Yes Yes   Sig: Take by mouth   dextromethorphan-guaiFENesin (ROBITUSSIN-DM)  mg/5 mL oral liquid   No No   Sig: Take 5 mL by mouth every 12 (twelve) hours   nitrofurantoin (MACROBID) 100 mg capsule 2023  No Yes   Sig: Take 1 capsule (100 mg total) by mouth 2 (two) times a day omeprazole (PriLOSEC) 20 mg delayed release capsule  at PRN  Yes No   Sig: Take 20 mg by mouth daily as needed PRN      Facility-Administered Medications: None       Past Medical History:   Diagnosis Date   • Polycystic ovary syndrome    • Sickle cell trait ([facility] [address] )        Past Surgical History:   Procedure Laterality Date   • CHOLECYSTECTOMY         Family History   Problem Relation Age of Onset   • Diabetes Mother    • Hypertension Mother    • Kidney disease Mother    • Diabetes Father    • Hypertension Father    • No Known Problems Daughter    • No Known Problems Son    • Diabetes Maternal Grandmother    • Alzheimer's disease Maternal Grandfather    • Liver cancer Paternal Grandmother    • Breast cancer Neg Hx    • Colon cancer Neg Hx    • Ovarian cancer Neg Hx      I have reviewed and agree with the history as documented  E-Cigarette/Vaping   • E-Cigarette Use Never User      E-Cigarette/Vaping Substances     Social History     Tobacco Use   • Smoking status: Never   • Smokeless tobacco: Never   Vaping Use   • Vaping Use: Never used   Substance Use Topics   • Alcohol use: Not Currently   • Drug use: Never       Review of Systems   Constitutional: Negative for chills and fever  HENT: Negative for ear pain and sore throat  Eyes: Negative for pain and visual disturbance  Respiratory: Negative for cough and shortness of breath  Cardiovascular: Negative for chest pain and palpitations  Gastrointestinal: Positive for abdominal pain and vomiting  Negative for nausea  Genitourinary: Negative for dysuria and hematuria  Musculoskeletal: Positive for back pain  Negative for arthralgias  Skin: Negative for color change and rash  Neurological: Negative for seizures and syncope  All other systems reviewed and are negative  Physical Exam  Physical Exam  Vitals reviewed  Constitutional:       General: She is not in acute distress  Appearance: She is well-developed   She is not ill-appearing or diaphoretic  HENT:      Right Ear: External ear normal  No swelling  Tympanic membrane is not bulging  Left Ear: External ear normal  No swelling  Tympanic membrane is not bulging  Nose: Nose normal       Mouth/Throat:      Pharynx: No oropharyngeal exudate  Eyes:      General: Lids are normal       Conjunctiva/sclera: Conjunctivae normal       Pupils: Pupils are equal, round, and reactive to light  Neck:      Thyroid: No thyromegaly  Vascular: No JVD  Trachea: No tracheal deviation  Cardiovascular:      Rate and Rhythm: Normal rate and regular rhythm  Pulses: Normal pulses  Heart sounds: Normal heart sounds  No murmur heard  No friction rub  No gallop  Pulmonary:      Effort: Pulmonary effort is normal  No respiratory distress  Breath sounds: Normal breath sounds  No stridor  No wheezing or rales  Chest:      Chest wall: No tenderness  Abdominal:      General: Bowel sounds are normal  There is no distension  Palpations: Abdomen is soft  There is no mass  Tenderness: There is abdominal tenderness  There is no guarding or rebound  Negative signs include Sawant's sign  Hernia: No hernia is present  Comments: Gravid abdomen   Musculoskeletal:         General: No tenderness  Normal range of motion  Cervical back: Normal range of motion and neck supple  No edema  Normal range of motion  Lymphadenopathy:      Cervical: No cervical adenopathy  Skin:     General: Skin is warm and dry  Capillary Refill: Capillary refill takes less than 2 seconds  Coloration: Skin is not pale  Findings: No erythema or rash  Neurological:      Mental Status: She is alert and oriented to person, place, and time  GCS: GCS eye subscore is 4  GCS verbal subscore is 5  GCS motor subscore is 6  Cranial Nerves: No cranial nerve deficit  Sensory: No sensory deficit  Deep Tendon Reflexes: Reflexes are normal and symmetric  Psychiatric:         Speech: Speech normal          Behavior: Behavior normal          Vital Signs  ED Triage Vitals [01/20/23 1109]   Temperature Pulse Respirations Blood Pressure SpO2   97 8 °F (36 6 °C) (!) 130 20 136/74 98 %      Temp Source Heart Rate Source Patient Position - Orthostatic VS BP Location FiO2 (%)   Temporal Monitor Lying Right arm --      Pain Score       10 - Worst Possible Pain           Vitals:    01/20/23 1109 01/20/23 1330   BP: 136/74 115/69   Pulse: (!) 130 104   Patient Position - Orthostatic VS: Lying Sitting         Visual Acuity      ED Medications  Medications   acetaminophen (TYLENOL) tablet 650 mg (650 mg Oral Given 1/20/23 1322)   ondansetron (ZOFRAN-ODT) dispersible tablet 4 mg (4 mg Oral Given 1/20/23 1322)       Diagnostic Studies  Results Reviewed     Procedure Component Value Units Date/Time    UA w Reflex to Microscopic w Reflex to Culture [[id number]]  (Abnormal) Collected: 01/20/23 1200    Lab Status: Final result Specimen: Urine, Clean Catch Updated: 01/20/23 1238     Color, UA Yellow     Clarity, UA Clear     Specific Gravity, UA 1 025     pH, UA 6 0     Leukocytes, UA Negative     Nitrite, UA Negative     Protein, UA Negative mg/dl      Glucose, UA Negative mg/dl      Ketones, UA 15 (1+) mg/dl      Urobilinogen, UA 0 2 E U /dl      Bilirubin, UA Negative     Occult Blood, UA Negative     URINE COMMENT --    Urine culture [[id number]] Collected: 01/20/23 1200    Lab Status:  In process Specimen: Urine, Clean Catch Updated: 01/20/23 1238    Lipase [[id number]]  (Normal) Collected: 01/20/23 1138    Lab Status: Final result Specimen: Blood from Arm, Right Updated: 01/20/23 1210     Lipase 15 u/L     Comprehensive metabolic panel [[id number]]  (Abnormal) Collected: 01/20/23 1138    Lab Status: Final result Specimen: Blood from Arm, Right Updated: 01/20/23 1210     Sodium 136 mmol/L      Potassium 4 0 mmol/L      Chloride 103 mmol/L      CO2 22 mmol/L      ANION GAP 11 mmol/L BUN 6 mg/dL      Creatinine 0 55 mg/dL      Glucose 87 mg/dL      Calcium 8 8 mg/dL      AST 18 U/L      ALT 10 U/L      Alkaline Phosphatase 71 U/L      Total Protein 6 9 g/dL      Albumin 3 5 g/dL      Total Bilirubin 0 54 mg/dL      eGFR 119 ml/min/1 73sq m     Narrative:      National Kidney Disease Foundation guidelines for Chronic Kidney Disease (CKD):   •  Stage 1 with normal or high GFR (GFR > 90 mL/min/1 73 square meters)  •  Stage 2 Mild CKD (GFR = 60-89 mL/min/1 73 square meters)  •  Stage 3A Moderate CKD (GFR = 45-59 mL/min/1 73 square meters)  •  Stage 3B Moderate CKD (GFR = 30-44 mL/min/1 73 square meters)  •  Stage 4 Severe CKD (GFR = 15-29 mL/min/1 73 square meters)  •  Stage 5 End Stage CKD (GFR <15 mL/min/1 73 square meters)  Note: GFR calculation is accurate only with a steady state creatinine    CBC and differential [[id number]]  (Abnormal) Collected: 01/20/23 1138    Lab Status: Final result Specimen: Blood from Arm, Right Updated: 01/20/23 1145     WBC 9 11 Thousand/uL      RBC 4 60 Million/uL      Hemoglobin 12 6 g/dL      Hematocrit 37 2 %      MCV 81 fL      MCH 27 4 pg      MCHC 33 9 g/dL      RDW 14 2 %      MPV 11 5 fL      Platelets 990 Thousands/uL      nRBC 0 /100 WBCs      Neutrophils Relative 85 %      Immat GRANS % 0 %      Lymphocytes Relative 11 %      Monocytes Relative 3 %      Eosinophils Relative 1 %      Basophils Relative 0 %      Neutrophils Absolute 7 69 Thousands/µL      Immature Grans Absolute 0 03 Thousand/uL      Lymphocytes Absolute 1 02 Thousands/µL      Monocytes Absolute 0 29 Thousand/µL      Eosinophils Absolute 0 06 Thousand/µL      Basophils Absolute 0 02 Thousands/µL     Blood culture #1 [[id number]] Collected: 01/20/23 1138    Lab Status: In process Specimen: Blood from Arm, Right Updated: 01/20/23 1143    Blood culture #2 [[id number]] Collected: 01/20/23 1138    Lab Status:  In process Specimen: Blood from Arm, Right Updated: 01/20/23 1143                 US kidney and bladder with pvr   Final Result by Lisa Rodriguez MD (01/20 1223)      No hydronephrosis  Workstation performed: [id number]                    Procedures  Procedures         ED Course  ED Course as of 01/20/23 1417   Fri Jan 20, 2023   1115 SpO2: 98 %   1115 Respirations: 20   1115 Pulse(!): 130   1115 Blood Pressure: 136/74   1115 Temperature: 97 8 °F (36 6 °C)   1130 Bedside fetal heart tones measuring 158 bpm    1146 WBC: 9 11   1146 Hemoglobin: 12 6   1146 Platelet Count: 486   1151 Renal US in process   1151 HR now 106 beats per min   1212 Lipase: 15   1212 Sodium: 136   1212 Awaiting urine and results of ultrasound heart rate currently 115 bpm   1238 FINDINGS:     KIDNEYS:  Symmetric and normal size  Right kidney:  11 0 x 6 0 x 6 2 cm  Volume 212 8 mL  Left kidney:  11 8 x 4 6 x 5 0 cm  Volume 140 5 mL     Right kidney  Normal echogenicity and contour  No mass is identified  No hydronephrosis  No shadowing calculi  No perinephric fluid collections      Left kidney  Normal echogenicity and contour  No mass is identified  No hydronephrosis  No shadowing calculi  No perinephric fluid collections      URETERS:  Nonvisualized      BLADDER:   Normally distended  No focal thickening or mass lesions  Bilateral ureteral jets detected  Prevoid: 321 7 mL  No significant post void volume  Measured post void volume in mL: 0     OTHER: Partially imaged intrauterine pregnancy      IMPRESSION:     No hydronephrosis      1238 Blood, UA: Negative   1238 Bilirubin, UA: Negative   1238 SL AMB POCT UROBILINOGEN: 0 2   1238 Ketones, UA(!): 15 (1+)   1238 POCT URINE PROTEIN: Negative   1238 pH, UA: 6 0   1238 SL AMB SPECIFIC GRAVITY_URINE: 1 025   1238 Clarity, UA: Clear   1238 Color, UA: Yellow   1240 Patient was reevaluated again at 1240 hrs  She was given update on testing, she admits to continue abdominal pain and nausea    Will provide antiemetics and speak with on-call OB   1250 TT sent to on call [facility]    8214 Per on call OB Dr Lunda Essex, workup here appropriate  1417 Patient was reevaluated again at bedside at 1415 hrs , significant other present, she states she is feeling improved she believes her symptoms potentially could have been related to recent antibiotic use  She stable for discharge I recommend close follow-up with OB/GYN if symptoms worsen  SBIRT 20yo+    Flowsheet Row Most Recent Value   SBIRT (23 yo +)    In order to provide better care to our patients, we are screening all of our patients for alcohol and drug use  Would it be okay to ask you these screening questions? Yes Filed at: 01/20/2023 1126   Initial Alcohol Screen: US AUDIT-C     1  How often do you have a drink containing alcohol? 0 Filed at: 01/20/2023 1126   2  How many drinks containing alcohol do you have on a typical day you are drinking? 0 Filed at: 01/20/2023 1126   3a  Male UNDER 65: How often do you have five or more drinks on one occasion? 0 Filed at: 01/20/2023 1126   3b  FEMALE Any Age, or MALE 65+: How often do you have 4 or more drinks on one occassion? 0 Filed at: 01/20/2023 1126   Audit-C Score 0 Filed at: 01/20/2023 1126   DES: How many times in the past year have you    Used an illegal drug or used a prescription medication for non-medical reasons? Never Filed at: 01/20/2023 1126                    MDM    Disposition  Final diagnoses:   Abdominal pain in pregnancy     Time reflects when diagnosis was documented in both MDM as applicable and the Disposition within this note     Time User Action Codes Description Comment    1/20/2023  2:17 PM Audra Campos Add [[id number],  R10 9] Abdominal pain in pregnancy       ED Disposition     ED Disposition   Discharge    Condition   Stable    Date/Time   Fri Jan 20, 2023  2:17 PM    Comment   Randa Clay discharge to home/self care                 Follow-up Information     Follow up With Specialties Details Why Contact Info Additional [address] [facility] Emergency Medicine Go to  If symptoms worsen [facility] [address] [phone]  [address] [facility], [facility] [address], [place], [address] [facility], [address]          Patient's Medications   Discharge Prescriptions    No medications on file       No discharge procedures on file      PDMP Review       Value Time User    PDMP Reviewed  Yes 10/13/2021  3:14 PM Nigel Hicks PA-C          ED Provider  Electronically Signed by           Heidy Carlin PA-C  01/20/23 4946

## 2023-01-22 LAB — BACTERIA UR CULT: NORMAL

## 2023-01-23 ENCOUNTER — ROUTINE PRENATAL (OUTPATIENT)
Dept: OBGYN CLINIC | Facility: CLINIC | Age: 38
End: 2023-01-23

## 2023-01-23 VITALS — DIASTOLIC BLOOD PRESSURE: 62 MMHG | SYSTOLIC BLOOD PRESSURE: 104 MMHG | WEIGHT: 209.4 LBS | BODY MASS INDEX: 33.8 KG/M2

## 2023-01-23 DIAGNOSIS — E66.9 OBESITY (BMI 30.0-34.9): ICD-10-CM

## 2023-01-23 DIAGNOSIS — Z34.92 SECOND TRIMESTER PREGNANCY: ICD-10-CM

## 2023-01-23 DIAGNOSIS — Z3A.24 24 WEEKS GESTATION OF PREGNANCY: ICD-10-CM

## 2023-01-23 DIAGNOSIS — D57.3 SICKLE CELL TRAIT IN MOTHER AFFECTING PREGNANCY (HCC): ICD-10-CM

## 2023-01-23 DIAGNOSIS — O09.522 MULTIGRAVIDA OF ADVANCED MATERNAL AGE IN SECOND TRIMESTER: ICD-10-CM

## 2023-01-23 DIAGNOSIS — O99.019 SICKLE CELL TRAIT IN MOTHER AFFECTING PREGNANCY (HCC): ICD-10-CM

## 2023-01-23 LAB
BACTERIA BLD CULT: NORMAL
BACTERIA BLD CULT: NORMAL

## 2023-01-23 NOTE — PROGRESS NOTES
Routine Prenatal Visit  [facility] [address] [facility] [address], [address]    Assessment/Plan:  Tash Marcum is a 44y o  year old [id number] at 24w2d who presents for routine prenatal visit  1  Obesity (BMI 30 0-34 9)    2  Multigravida of advanced maternal age in second trimester    3  Sickle cell trait in mother affecting pregnancy ([facility] [address] )    4  Second trimester pregnancy  -     Glucose, 1H PG; Future  -     CBC and differential; Future  -     Anemia Panel w/Reflex, OB; Future    5  24 weeks gestation of pregnancy  Assessment & Plan:  - Reviewed 2nd trimester precautions  - 1 hr gtt and cbc script placed in chart  - 3/21/23- growth scan  - RT office 4 weeks    Orders:  -     Glucose, 1H PG; Future  -     CBC and differential; Future  -     Anemia Panel w/Reflex, OB; Future        Subjective:     CC: Prenatal care    Phyllis Mcardle is a 44 y o  [id number] female who presents for routine prenatal care at 24w2d  Pregnancy ROS: no leakage of fluid, pelvic pain, or vaginal bleeding   good fetal movement       The following portions of the patient's history were reviewed and updated as appropriate: allergies, current medications, past family history, past medical history, obstetric history, gynecologic history, past social history, past surgical history and problem list       Objective:  /62   Wt 95 kg (209 lb 6 4 oz)   LMP 2022   BMI 33 80 kg/m²   Pregravid Weight/BMI: 83 9 kg (185 lb) (BMI 29 87)  Current Weight: 95 kg (209 lb 6 4 oz)   Total Weight Gain: 11 1 kg (24 lb 6 4 oz)   Pre-Magaly Vitals    Flowsheet Row Most Recent Value   Prenatal Assessment    Fetal Heart Rate 153   Fundal Height (cm) 23 cm   Movement Present   Prenatal Vitals    Blood Pressure 104/62   Weight - Scale 95 kg (209 lb 6 4 oz)   Urine Albumin/Glucose    Dilation/Effacement/Station    Vaginal Drainage    Draining Fluid No   Edema    LLE Edema None   RLE Edema None   Facial Edema None           General: Well appearing, no distress  Respiratory: Unlabored breathing  Cardiovascular: Regular rate  Abdomen: Soft, gravid, nontender  Fundal Height: Appropriate for gestational age  Extremities: Warm and well perfused  Non tender

## 2023-01-23 NOTE — ASSESSMENT & PLAN NOTE
- Reviewed 2nd trimester precautions  - 1 hr gtt and cbc script placed in chart    - 3/21/23- growth scan  - RT office 4 weeks

## 2023-01-25 LAB
BACTERIA BLD CULT: NORMAL
BACTERIA BLD CULT: NORMAL

## 2023-02-02 ENCOUNTER — CONSULT (OUTPATIENT)
Dept: NEUROLOGY | Facility: CLINIC | Age: 38
End: 2023-02-02

## 2023-02-02 VITALS
HEART RATE: 74 BPM | HEIGHT: 66 IN | DIASTOLIC BLOOD PRESSURE: 60 MMHG | BODY MASS INDEX: 33.91 KG/M2 | SYSTOLIC BLOOD PRESSURE: 90 MMHG | WEIGHT: 211 LBS

## 2023-02-02 DIAGNOSIS — R20.2 PARESTHESIA AND PAIN OF BOTH UPPER EXTREMITIES: ICD-10-CM

## 2023-02-02 DIAGNOSIS — R29.898 OTHER SYMPTOMS AND SIGNS INVOLVING THE MUSCULOSKELETAL SYSTEM: ICD-10-CM

## 2023-02-02 DIAGNOSIS — M79.601 PARESTHESIA AND PAIN OF BOTH UPPER EXTREMITIES: ICD-10-CM

## 2023-02-02 DIAGNOSIS — M79.602 PARESTHESIA AND PAIN OF BOTH UPPER EXTREMITIES: ICD-10-CM

## 2023-02-02 NOTE — ASSESSMENT & PLAN NOTE
Assessment:  Patient is a pleasant 39-year-old female that is currently 25 weeks pregnant, presented to the resident neurology clinic for evaluation of paresthesias in bilateral upper extremities  In the patient's prior work-up and MRI of the C-spine did show an osteophyte at the C5-C6 level that would help account for her current symptoms  An extensive discussion with attending present was had with the patient  Her clinical course, and evaluations by other providers were recapped  We discussed the risks and benefits of obtaining repeat imaging, or starting a new medication at this juncture in her pregnancy, and it was decided to to currently hold off until patient is postpartum  The patient was agreeable to this plan  Plan: We will always be happy to see Sindy Wilson in the resident neurology clinic  She currently does not need to schedule a follow-up appointment and can be seen as needed  The patient is aware that she may call at any time for further questions or concerns that was not brought up during the visit

## 2023-02-02 NOTE — PROGRESS NOTES
Patient ID: Vanessa Blunt is a 44 y o  female  Assessment/Plan:  Paresthesia and pain of both upper extremities  Assessment:  Patient is a pleasant 58-year-old female that is currently 25 weeks pregnant, presented to the resident neurology clinic for evaluation of paresthesias in bilateral upper extremities  In the patient's prior work-up and MRI of the C-spine did show an osteophyte at the C5-C6 level that would help account for her current symptoms  An extensive discussion with attending present was had with the patient  Her clinical course, and evaluations by other providers were recapped  We discussed the risks and benefits of obtaining repeat imaging, or starting a new medication at this juncture in her pregnancy, and it was decided to to currently hold off until patient is postpartum  The patient was agreeable to this plan  Plan: We will always be happy to see Michele Liu in the resident neurology clinic  She currently does not need to schedule a follow-up appointment and can be seen as needed  The patient is aware that she may call at any time for further questions or concerns that was not brought up during the visit  Diagnoses and all orders for this visit:    Other symptoms and signs involving the musculoskeletal system  -     Ambulatory Referral to Neurology    Paresthesia and pain of both upper extremities  -     Ambulatory Referral to Neurology         Subjective:  Neuropathy:  She describes symptoms of numbness, burning, lancinating pain, tingling, cramping, and squeezing affecting her arms in a symmetric pattern  She describes the pain to be in a fluctuating, on and off pattern and can be quite severe where the pain would last between 1 5 and 2 days  The patient denies hypersensitivity and allodynia  The patient reports that the symptoms was sudden in onset starting around 1 year ago and gradually worsening since then    The symptoms currently occur in the evening and sometimes wake her from sleep  She also describes autonomic symptoms of orthostasis, bloating, early satiety, alternating diarrhea and constipation, dry eyes and dry mouth, and blurred vision however she denies lack of sweating and sexual dysfunction    Previous treatment has not alleviated symptoms  Prior evaluation has included first going to her primary care doctor where the patient obtain an MRI of the C-spine and EMG of the bilateral upper extremities  The patient was referred to neurosurgery  Neurosurgery completed an examination and did gait not recommend intervention at this time as the patient is pregnant  Neurosurgery referred the patient to pain medicine where the patient was initially placed on gabapentin and Robaxin  The patient reports that PT has not helped her, on how she feels incredibly tired and in a lot of pain post sessions  The following portions of the patient's history were reviewed and updated as appropriate: allergies, current medications, past family history, past medical history, past social history, past surgical history and problem list     Objective:  Blood pressure 90/60, pulse 74, height 5' 6" (1 676 m), weight 95 7 kg (211 lb), last menstrual period 07/13/2022, unknown if currently breastfeeding  Physical Exam  Vitals and nursing note reviewed  HENT:      Head: Normocephalic and atraumatic  Mouth/Throat:      Mouth: Mucous membranes are dry  Eyes:      General: Lids are normal       Extraocular Movements: Extraocular movements intact  Pupils: Pupils are equal, round, and reactive to light  Cardiovascular:      Rate and Rhythm: Normal rate  Pulses: Normal pulses  Pulmonary:      Effort: Pulmonary effort is normal    Musculoskeletal:         General: Normal range of motion  Cervical back: Normal range of motion  Tenderness present  Skin:     General: Skin is warm  Capillary Refill: Capillary refill takes less than 2 seconds  Neurological:      General: No focal deficit present  Mental Status: She is alert  Motor: Motor strength is normal       Coordination: Coordination is intact  Deep Tendon Reflexes: Reflexes are normal and symmetric  Psychiatric:         Mood and Affect: Mood normal          Speech: Speech normal        Neurological Exam  Mental Status  Alert  Oriented to person, place, time and situation  Speech is normal  Language is fluent with no aphasia  Attention and concentration are normal     Cranial Nerves  CN II: Right visual acuity: normal  Left visual acuity: normal  Right normal visual field  Left normal visual field  CN III, IV, VI: Extraocular movements intact bilaterally  Normal lids and orbits bilaterally  Pupils equal round and reactive to light bilaterally  Right pupil: 3 mm  Round  Reactive to light  Left pupil: 3 mm  Round  Reactive to light  CN V: Facial sensation is normal   CN VII:  Right: There is no facial weakness  Left: There is no facial weakness  CN VIII: Hearing is normal   CN IX, X: Palate elevates symmetrically  CN XI: Shoulder shrug strength is normal   CN XII: Tongue midline without atrophy or fasciculations  Motor  Normal muscle bulk throughout  No fasciculations present  Normal muscle tone  No abnormal involuntary movements  Strength is 5/5 throughout all four extremities  Sensory  Sensation is intact to light touch, pinprick, vibration and proprioception in all four extremities  Reflexes  Deep tendon reflexes are 2+ and symmetric in all four extremities  Coordination    Finger-to-nose, rapid alternating movements and heel-to-shin normal bilaterally without dysmetria  Gait  Normal casual, toe, heel and tandem gait  ROS:  Review of Systems   Constitutional: Negative for chills and fever  HENT: Negative for ear pain and sore throat  Eyes: Negative for pain and visual disturbance  Respiratory: Negative for cough and shortness of breath  Cardiovascular: Negative for chest pain and palpitations  Gastrointestinal: Negative for abdominal pain and vomiting  Genitourinary: Negative for dysuria and hematuria  Musculoskeletal: Positive for neck pain and neck stiffness  Negative for arthralgias and back pain  Skin: Negative for color change and rash  Neurological: Negative for dizziness, tremors, seizures, syncope, facial asymmetry, speech difficulty, weakness, light-headedness, numbness and headaches  Psychiatric/Behavioral: Negative for agitation, behavioral problems, confusion, decreased concentration, dysphoric mood, hallucinations, self-injury, sleep disturbance and suicidal ideas  The patient is not nervous/anxious and is not hyperactive  All other systems reviewed and are negative

## 2023-02-15 ENCOUNTER — APPOINTMENT (OUTPATIENT)
Dept: LAB | Facility: MEDICAL CENTER | Age: 38
End: 2023-02-15

## 2023-02-15 DIAGNOSIS — Z34.92 SECOND TRIMESTER PREGNANCY: ICD-10-CM

## 2023-02-15 DIAGNOSIS — Z3A.24 24 WEEKS GESTATION OF PREGNANCY: ICD-10-CM

## 2023-02-15 LAB
BASOPHILS # BLD AUTO: 0.02 THOUSANDS/ÂΜL (ref 0–0.1)
BASOPHILS NFR BLD AUTO: 0 % (ref 0–1)
EOSINOPHIL # BLD AUTO: 0.2 THOUSAND/ÂΜL (ref 0–0.61)
EOSINOPHIL NFR BLD AUTO: 2 % (ref 0–6)
ERYTHROCYTE [DISTWIDTH] IN BLOOD BY AUTOMATED COUNT: 14.6 % (ref 11.6–15.1)
GLUCOSE 1H P 50 G GLC PO SERPL-MCNC: 135 MG/DL (ref 40–134)
HCT VFR BLD AUTO: 35.3 % (ref 34.8–46.1)
HGB BLD-MCNC: 11.3 G/DL (ref 11.5–15.4)
IMM GRANULOCYTES # BLD AUTO: 0.06 THOUSAND/UL (ref 0–0.2)
IMM GRANULOCYTES NFR BLD AUTO: 1 % (ref 0–2)
LYMPHOCYTES # BLD AUTO: 1.25 THOUSANDS/ÂΜL (ref 0.6–4.47)
LYMPHOCYTES NFR BLD AUTO: 15 % (ref 14–44)
MCH RBC QN AUTO: 25.5 PG (ref 26.8–34.3)
MCHC RBC AUTO-ENTMCNC: 32 G/DL (ref 31.4–37.4)
MCV RBC AUTO: 80 FL (ref 82–98)
MONOCYTES # BLD AUTO: 0.44 THOUSAND/ÂΜL (ref 0.17–1.22)
MONOCYTES NFR BLD AUTO: 5 % (ref 4–12)
NEUTROPHILS # BLD AUTO: 6.67 THOUSANDS/ÂΜL (ref 1.85–7.62)
NEUTS SEG NFR BLD AUTO: 77 % (ref 43–75)
NRBC BLD AUTO-RTO: 0 /100 WBCS
PLATELET # BLD AUTO: 342 THOUSANDS/UL (ref 149–390)
PMV BLD AUTO: 11.9 FL (ref 8.9–12.7)
RBC # BLD AUTO: 4.44 MILLION/UL (ref 3.81–5.12)
WBC # BLD AUTO: 8.64 THOUSAND/UL (ref 4.31–10.16)

## 2023-02-17 ENCOUNTER — TELEPHONE (OUTPATIENT)
Dept: OBGYN CLINIC | Facility: MEDICAL CENTER | Age: 38
End: 2023-02-17

## 2023-02-17 DIAGNOSIS — R73.09 INCREASED GLUCOSE LEVEL: Primary | ICD-10-CM

## 2023-02-20 ENCOUNTER — APPOINTMENT (OUTPATIENT)
Dept: LAB | Facility: MEDICAL CENTER | Age: 38
End: 2023-02-20

## 2023-02-20 ENCOUNTER — ROUTINE PRENATAL (OUTPATIENT)
Dept: OBGYN CLINIC | Facility: CLINIC | Age: 38
End: 2023-02-20

## 2023-02-20 VITALS — DIASTOLIC BLOOD PRESSURE: 68 MMHG | WEIGHT: 213 LBS | BODY MASS INDEX: 34.38 KG/M2 | SYSTOLIC BLOOD PRESSURE: 102 MMHG

## 2023-02-20 DIAGNOSIS — Z3A.28 28 WEEKS GESTATION OF PREGNANCY: ICD-10-CM

## 2023-02-20 DIAGNOSIS — O99.019 SICKLE CELL TRAIT IN MOTHER AFFECTING PREGNANCY (HCC): Primary | ICD-10-CM

## 2023-02-20 DIAGNOSIS — D57.3 SICKLE CELL TRAIT IN MOTHER AFFECTING PREGNANCY (HCC): Primary | ICD-10-CM

## 2023-02-20 LAB — GLUCOSE 1H P 50 G GLC PO SERPL-MCNC: 131 MG/DL (ref 40–134)

## 2023-02-20 NOTE — ASSESSMENT & PLAN NOTE
- We reviewed her 1 hr glucose of 135- she requests to repeat the 1 hr test rather than take the 3hr  Technically she should complete the 3hr but we discussed reasonable to rescreen  - We do not currently have Tdap vaccine in [place] and therefore she will need to get it at another visit

## 2023-02-20 NOTE — PROGRESS NOTES
[id number]  OB History    Para Term  AB Living   5 2 2   2 2   SAB IAB Ectopic Multiple Live Births   2       2      # Outcome Date GA Lbr New/2nd Weight Sex Delivery Anes PTL Lv   5 Current            4 SAB 2021 4w0d          3 SAB 10/2021 7w0d          2 Term 17 40w0d  3884 g (8 lb 9 oz) M Vag-Spont  N KARLA   1 Term 10/05/04 40w0d  3430 g (7 lb 9 oz) F Vag-Spont   KARLA         * Pt presents for OB intake  *[id number]  *Pt's LMP was Patient's last menstrual period was 2022  *Ultrasound date:***   ***weeks ***days  *Estimated date of delivery: ***   * confirmed by {LMP or US}    *Signs/Symptoms of Pregnancy   *{YES/NO} Constipation    *{YES/NO} Headaches   *{YES/NO} Cramping  *{YES/NO} Spotting      Diabetes  If any of the following answers are  yes, please order 1 hour GTT, 50g   *{YES/NO} Hx of GDM    *{YES/NO} BMI >35    *{YES/NO} First degree relative with type 2 diabetes    *{YES/NO} Hx of PCOS   *{YES/NO} Current metformin use    *{YES/NO} Prior hx of LGA/macrosomia    *{YES/NO} AMA with other risk factors  Hypertension-    *{YES/NO} Hx of chronic HTN    *{YES/NO} Hx of gestational HTN   *{YES/NO} hx of preeclampsia, eclampsia, or HELLP syndrome     *Infection Screening   *{YES/NO} Does the pt have a hx of MRSA? *if yes- follow MRSA protocol and obtain a nasal swab for MRSA culture   *{YES/NO} History of herpes? *Immunizations:   *{YES/NO} Discussed influenza vaccine     Received:  *{Location} *{Date}  Given: *{YES/NO}   *{YES/NO} Discussed TDaP vaccine   *{YES/NO} COVID Vaccine *{Brand} Received at *{Place} on *{Date}      *{YES/NO}  Vaccine Card scanned into chart    SOCIOECONOMIC:  Mental/Physical/Sexual Abuse  *{Y/N}  Housing Security  *{Y/N}  Food Security  *{Y/N}  Speacial Needs/Challenges  *{Y/N}  Substance Use Disorder   ETOH   *{Y/N}    OPIOD   *{Y/N}     THC *{Y/N}    Other: ***  Opioid Therapy *{Y/N}        ACTIVE MEDICAL/MENTAL HEALTH CONDITIONS  Autoimmune Disease *{Y/N}  Asthma: *{Y/N}   Cardiac Disease *{Y/N}  Chronic HTN, Pregestational  *{Y/N}  Hepatitis  *{Y/N}     treated: *{Y/N}  Thalassemia Alpha*{Y/N}  Beta *{Y/N}  Renal Disease *{Y/N}  Sickle Cell Ds  Trait *{Y/N} Disease *{Y/N}  Depression *{Y/N}   Anxiety*{YES/NO}  Medications*{yes/no}    *Interview education   *Handouts given:    *Baby and Me support center     *MyChart sign up instructions    *Lab Locations    *  [facility] Pediatricians List/Choosing Pediatrician Sheet    *[facility]    *Schedule for Prenatal Visits    *Pregnancy Warning Signs Reviewed    *Safe Medications During Pregnancy    *SMA and CF Testing information sheet    *CPT Code Sheet/MFM 13week NT pamphlet    *[employer]      SBIRT Screen:  Depression Screening Follow-up Plan: Patient's depression screening was {POSITIVE/NEGATIVE:42629} with an [place] score of          *[facility] M[facility]   *{YES/NO} discussed genetic testing- pt interested   Patient has been informed of basic prenatal advice such as avoiding alcohol, drugs, and smoking  She should remain hydrated and take daily prenatal vitamins  Patient should avoid caffeine, raw sprouts, high mercury fish, undercooked fish, raw eggs, organ meat, unwashed produce, and unpasteurized cheeses, milk, and fruit juice and undercooked meats  She has been informed about toxoplasmosis and to avoid cat feces  *Details that I feel the provider should be aware of:  ***    PN1 visit scheduled for *{Date}  The patient was oriented to our practice and all questions were answered      Interviewed by:  Drake Thapa

## 2023-02-20 NOTE — PROGRESS NOTES
Routine Prenatal Visit  [facility] [address] [place], [place]    Assessment/Plan:  Balwinder Ricardo is a 44y o  year old [id number] at 28w2d who presents for routine prenatal visit  1  Sickle cell trait in mother affecting pregnancy ([address] )    2  28 weeks gestation of pregnancy  Assessment & Plan:  - We reviewed her 1 hr glucose of 135- she requests to repeat the 1 hr test rather than take the 3hr  Technically she should complete the 3hr but we discussed reasonable to rescreen  - We do not currently have Tdap vaccine in [facility] and therefore she will need to get it at another visit  Orders:  -     Glucose, 1H PG; Future        Subjective:     CC: Prenatal care    Gunjan Nunes is a 44 y o  [id number] female who presents for routine prenatal care at 28w2d  Pregnancy ROS: Denies leakage of fluid, pelvic pain, or vaginal bleeding  Reports normal fetal movement  The following portions of the patient's history were reviewed and updated as appropriate: allergies, current medications, past family history, past medical history, obstetric history, gynecologic history, past social history, past surgical history and problem list       Objective:  /68   Wt 96 6 kg (213 lb)   LMP 2022   BMI 34 38 kg/m²   Pregravid Weight/BMI: 83 9 kg (185 lb) (BMI 29 87)  Current Weight: 96 6 kg (213 lb)   Total Weight Gain: 12 7 kg (28 lb)   Pre-Magaly Vitals    Flowsheet Row Most Recent Value   Prenatal Assessment    Fetal Heart Rate 159   Movement Present   Prenatal Vitals    Blood Pressure 102/68   Weight - Scale 96 6 kg (213 lb)   Urine Albumin/Glucose    Dilation/Effacement/Station    Vaginal Drainage    Draining Fluid No   Edema    LLE Edema Trace   RLE Edema Trace   Facial Edema None           General: Well appearing, no distress  Respiratory: Unlabored breathing  Cardiovascular: Regular rate    Abdomen: Soft, gravid, nontender  Fundal Height: Appropriate for gestational age   Extremities: Warm and well perfused  Non tender

## 2023-03-06 ENCOUNTER — ROUTINE PRENATAL (OUTPATIENT)
Dept: OBGYN CLINIC | Facility: CLINIC | Age: 38
End: 2023-03-06

## 2023-03-06 VITALS — WEIGHT: 221 LBS | BODY MASS INDEX: 35.67 KG/M2 | DIASTOLIC BLOOD PRESSURE: 62 MMHG | SYSTOLIC BLOOD PRESSURE: 100 MMHG

## 2023-03-06 DIAGNOSIS — D57.3 SICKLE CELL TRAIT IN MOTHER AFFECTING PREGNANCY (HCC): ICD-10-CM

## 2023-03-06 DIAGNOSIS — Z34.83 ENCOUNTER FOR SUPERVISION OF OTHER NORMAL PREGNANCY IN THIRD TRIMESTER: Primary | ICD-10-CM

## 2023-03-06 DIAGNOSIS — O99.019 SICKLE CELL TRAIT IN MOTHER AFFECTING PREGNANCY (HCC): ICD-10-CM

## 2023-03-06 DIAGNOSIS — Z3A.30 30 WEEKS GESTATION OF PREGNANCY: ICD-10-CM

## 2023-03-06 DIAGNOSIS — O09.522 MULTIGRAVIDA OF ADVANCED MATERNAL AGE IN SECOND TRIMESTER: ICD-10-CM

## 2023-03-06 NOTE — PROGRESS NOTES
Routine Prenatal Visit  [facility] [address] [place], [place]    Assessment/Plan:  Veda Mattson is a 44y o  year old [id number] at 30w2d who presents for routine prenatal visit  1  Encounter for supervision of other normal pregnancy in third trimester    2  Multigravida of advanced maternal age in second trimester    3  Sickle cell trait in mother affecting pregnancy ([address] )    4  30 weeks gestation of pregnancy          Subjective:     CC: Prenatal care    Chuck Abraham is a 44 y o  [id number] female who presents for routine prenatal care at 30w2d  Pregnancy ROS: no leakage of fluid, pelvic pain, or vaginal bleeding   good fetal movement  The following portions of the patient's history were reviewed and updated as appropriate: allergies, current medications, past family history, past medical history, obstetric history, gynecologic history, past social history, past surgical history and problem list       Objective:  /62   Wt 100 kg (221 lb)   LMP 2022   BMI 35 67 kg/m²   Pregravid Weight/BMI: 83 9 kg (185 lb) (BMI 29 87)  Current Weight: 100 kg (221 lb)   Total Weight Gain: 16 3 kg (36 lb)   Pre-Magaly Vitals    Flowsheet Row Most Recent Value   Prenatal Assessment    Fetal Heart Rate 142   Movement Present   Prenatal Vitals    Blood Pressure 100/62   Weight - Scale 100 kg (221 lb)   Urine Albumin/Glucose    Dilation/Effacement/Station    Vaginal Drainage    Edema    LLE Edema Trace   RLE Edema Trace   Facial Edema None           General: Well appearing, no distress  Respiratory: Unlabored breathing  Cardiovascular: Regular rate  Abdomen: Soft, gravid, nontender  Fundal Height: Appropriate for gestational age  Extremities: Warm and well perfused  Non tender

## 2023-03-14 PROBLEM — Z3A.32 32 WEEKS GESTATION OF PREGNANCY: Status: ACTIVE | Noted: 2022-11-28

## 2023-03-14 NOTE — PROGRESS NOTES
Routine Prenatal Visit  [facility] [address] [place], [place]    Assessment/Plan:  Valentin Wayne is a 44y o  year old [id number] at 31w3d who presents for routine prenatal visit  1  32 weeks gestation of pregnancy  Assessment & Plan:  NIPT and AFP normal   Has growth on 3/21/23    Normal GTT the second time was 131  First was 135        2  Multigravida of advanced maternal age in second trimester  Assessment & Plan:  NIPT  / AFP - negative        3  Sickle cell trait in mother affecting pregnancy ([facility] [address] )  Assessment & Plan:  SCT HbAS  HbA 59 2% HbS 36 2%  FOB neg            Subjective:     CC: Prenatal care    Michael Florez is a 44 y o  [id number] female who presents for routine prenatal care at 31w3d  Pregnancy ROS: no leakage of fluid, pelvic pain, or vaginal bleeding  yes fetal movement  The following portions of the patient's history were reviewed and updated as appropriate: allergies, current medications, past family history, past medical history, obstetric history, gynecologic history, past social history, past surgical history and problem list       Objective:  /70   Wt 101 kg (223 lb 3 2 oz)   LMP 2022   BMI 36 03 kg/m²   Pregravid Weight/BMI: 83 9 kg (185 lb) (BMI 29 87)  Current Weight: 101 kg (223 lb 3 2 oz)   Total Weight Gain: 17 3 kg (38 lb 3 2 oz)   Pre- Vitals    Flowsheet Row Most Recent Value   Prenatal Assessment    Fetal Heart Rate 154   Movement Present   Prenatal Vitals    Blood Pressure 118/70   Weight - Scale 101 kg (223 lb 3 2 oz)   Urine Albumin/Glucose    Dilation/Effacement/Station    Vaginal Drainage    Draining Fluid No   Edema            General: Well appearing, no distress  Respiratory: Unlabored breathing  Cardiovascular: Regular rate  Abdomen: Soft, gravid, nontender  Fundal Height: Appropriate for gestational age  Extremities: Warm and well perfused  Non tender

## 2023-03-16 ENCOUNTER — HOSPITAL ENCOUNTER (EMERGENCY)
Facility: HOSPITAL | Age: 38
Discharge: HOME | End: 2023-03-16
Attending: EMERGENCY MEDICINE | Admitting: EMERGENCY MEDICINE

## 2023-03-16 VITALS
HEART RATE: 96 BPM | DIASTOLIC BLOOD PRESSURE: 51 MMHG | SYSTOLIC BLOOD PRESSURE: 105 MMHG | RESPIRATION RATE: 16 BRPM | TEMPERATURE: 97.6 F | OXYGEN SATURATION: 97 %

## 2023-03-16 DIAGNOSIS — O26.893 ABDOMINAL PAIN DURING PREGNANCY IN THIRD TRIMESTER: Primary | ICD-10-CM

## 2023-03-16 DIAGNOSIS — R10.9 ABDOMINAL PAIN DURING PREGNANCY IN THIRD TRIMESTER: Primary | ICD-10-CM

## 2023-03-16 LAB
ALBUMIN SERPL BCP-MCNC: 3.5 G/DL (ref 3.5–5)
ALP SERPL-CCNC: 93 U/L (ref 34–104)
ALT SERPL W P-5'-P-CCNC: 8 U/L (ref 7–52)
ANION GAP SERPL CALCULATED.3IONS-SCNC: 9 MMOL/L (ref 4–13)
AST SERPL W P-5'-P-CCNC: 11 U/L (ref 13–39)
BASOPHILS # BLD AUTO: 0.03 THOUSANDS/ÂΜL (ref 0–0.1)
BASOPHILS NFR BLD AUTO: 0 % (ref 0–1)
BILIRUB SERPL-MCNC: 0.29 MG/DL (ref 0.2–1)
BILIRUB UR QL STRIP: NEGATIVE
BUN SERPL-MCNC: 5 MG/DL (ref 5–25)
CALCIUM SERPL-MCNC: 9.3 MG/DL (ref 8.4–10.2)
CHLORIDE SERPL-SCNC: 104 MMOL/L (ref 96–108)
CLARITY UR: CLEAR
CO2 SERPL-SCNC: 24 MMOL/L (ref 21–32)
COLOR UR: YELLOW
CREAT SERPL-MCNC: 0.56 MG/DL (ref 0.6–1.3)
EOSINOPHIL # BLD AUTO: 0.22 THOUSAND/ÂΜL (ref 0–0.61)
EOSINOPHIL NFR BLD AUTO: 2 % (ref 0–6)
ERYTHROCYTE [DISTWIDTH] IN BLOOD BY AUTOMATED COUNT: 15.8 % (ref 11.6–15.1)
GFR SERPL CREATININE-BSD FRML MDRD: 118 ML/MIN/1.73SQ M
GLUCOSE SERPL-MCNC: 114 MG/DL (ref 65–140)
GLUCOSE UR STRIP-MCNC: NEGATIVE MG/DL
HCT VFR BLD AUTO: 36.8 % (ref 34.8–46.1)
HGB BLD-MCNC: 12 G/DL (ref 11.5–15.4)
HGB UR QL STRIP.AUTO: NEGATIVE
IMM GRANULOCYTES # BLD AUTO: 0.06 THOUSAND/UL (ref 0–0.2)
IMM GRANULOCYTES NFR BLD AUTO: 1 % (ref 0–2)
KETONES UR STRIP-MCNC: NEGATIVE MG/DL
LEUKOCYTE ESTERASE UR QL STRIP: NEGATIVE
LIPASE SERPL-CCNC: 25 U/L (ref 11–82)
LYMPHOCYTES # BLD AUTO: 2.36 THOUSANDS/ÂΜL (ref 0.6–4.47)
LYMPHOCYTES NFR BLD AUTO: 24 % (ref 14–44)
MCH RBC QN AUTO: 25.6 PG (ref 26.8–34.3)
MCHC RBC AUTO-ENTMCNC: 32.6 G/DL (ref 31.4–37.4)
MCV RBC AUTO: 79 FL (ref 82–98)
MONOCYTES # BLD AUTO: 0.55 THOUSAND/ÂΜL (ref 0.17–1.22)
MONOCYTES NFR BLD AUTO: 6 % (ref 4–12)
NEUTROPHILS # BLD AUTO: 6.64 THOUSANDS/ÂΜL (ref 1.85–7.62)
NEUTS SEG NFR BLD AUTO: 67 % (ref 43–75)
NITRITE UR QL STRIP: NEGATIVE
NRBC BLD AUTO-RTO: 0 /100 WBCS
PH UR STRIP.AUTO: 7 [PH]
PLATELET # BLD AUTO: 298 THOUSANDS/UL (ref 149–390)
PMV BLD AUTO: 11.3 FL (ref 8.9–12.7)
POTASSIUM SERPL-SCNC: 3.7 MMOL/L (ref 3.5–5.3)
PROT SERPL-MCNC: 6.6 G/DL (ref 6.4–8.4)
PROT UR STRIP-MCNC: NEGATIVE MG/DL
RBC # BLD AUTO: 4.68 MILLION/UL (ref 3.81–5.12)
SODIUM SERPL-SCNC: 137 MMOL/L (ref 135–147)
SP GR UR STRIP.AUTO: 1.01 (ref 1–1.03)
UROBILINOGEN UR QL STRIP.AUTO: 0.2 E.U./DL
WBC # BLD AUTO: 9.86 THOUSAND/UL (ref 4.31–10.16)

## 2023-03-16 RX ORDER — ACETAMINOPHEN 325 MG/1
650 TABLET ORAL ONCE
Status: COMPLETED | OUTPATIENT
Start: 2023-03-16 | End: 2023-03-16

## 2023-03-16 RX ADMIN — ACETAMINOPHEN 650 MG: 325 TABLET, FILM COATED ORAL at 18:02

## 2023-03-16 NOTE — ED PROVIDER NOTES
History  Chief Complaint   Patient presents with   • Medical Problem     Patient is 32 weeks pregnant and states she is having lower abdominal pressure for a few days  Patient also states her underwear has been wet so she's unsure if she's leaking any fluid  Abdominal Pain  Pain location:  LLQ  Pain quality: aching, cramping and dull    Pain radiates to:  Suprapubic region  Pain severity:  Moderate  Onset quality:  Gradual  Duration:  1 week  Timing:  Intermittent  Progression:  Waxing and waning  Chronicity:  New  Relieved by:  Nothing  Worsened by: Movement  Ineffective treatments:  None tried  Associated symptoms: no anorexia, no chest pain, no chills, no constipation, no cough, no diarrhea, no dysuria, no fever, no hematuria, no melena, no nausea, no shortness of breath, no sore throat, no vaginal bleeding, no vaginal discharge and no vomiting    Risk factors: pregnancy    [id number] at 32 weeks gestation presents emergency room for evaluation of 1 week of lower abdominal pressure  Patient reports gradual onset of symptoms of the last week she notes pain in the lower abdomen which is pressure-like and comes and goes  Frequency of the events is over hours  Denies frequent contraction-like symptoms  Denies sudden gush or loss of fluid but does note some wetness in the groin region unclear if from urinary incontinence or amniotic fluid per patient  Denies history of trauma  No dysuria  No fevers or chills  No flank pain  Reports no issues with this current pregnancy  Prior to Admission Medications   Prescriptions Last Dose Informant Patient Reported? Taking?    Prenatal w/o A Vit-Fe Fum-FA (PRENATA PO)   Yes No   Sig: Take by mouth   omeprazole (PriLOSEC) 20 mg delayed release capsule   Yes No   Sig: Take 20 mg by mouth daily as needed PRN      Facility-Administered Medications: None       Past Medical History:   Diagnosis Date   • Polycystic ovary syndrome    • Sickle cell trait ([facility] [address] ) Past Surgical History:   Procedure Laterality Date   • CHOLECYSTECTOMY         Family History   Problem Relation Age of Onset   • Diabetes Mother    • Hypertension Mother    • Kidney disease Mother    • Diabetes Father    • Hypertension Father    • No Known Problems Daughter    • No Known Problems Son    • Diabetes Maternal Grandmother    • Alzheimer's disease Maternal Grandfather    • Liver cancer Paternal Grandmother    • Breast cancer Neg Hx    • Colon cancer Neg Hx    • Ovarian cancer Neg Hx      I have reviewed and agree with the history as documented  E-Cigarette/Vaping   • E-Cigarette Use Never User      E-Cigarette/Vaping Substances     Social History     Tobacco Use   • Smoking status: Never   • Smokeless tobacco: Never   Vaping Use   • Vaping Use: Never used   Substance Use Topics   • Alcohol use: Not Currently   • Drug use: Never       Review of Systems   Constitutional: Negative for chills and fever  HENT: Negative for ear pain and sore throat  Eyes: Negative for pain and visual disturbance  Respiratory: Negative for cough and shortness of breath  Cardiovascular: Negative for chest pain and palpitations  Gastrointestinal: Positive for abdominal pain  Negative for anorexia, constipation, diarrhea, melena, nausea and vomiting  Genitourinary: Negative for dysuria, hematuria, vaginal bleeding and vaginal discharge  Musculoskeletal: Negative for arthralgias and back pain  Skin: Negative for color change and rash  Neurological: Negative for seizures and syncope  All other systems reviewed and are negative  Physical Exam  Physical Exam  Vitals and nursing note reviewed  Exam conducted with a chaperone present  Constitutional:       General: She is not in acute distress  Appearance: Normal appearance  She is well-developed  HENT:      Head: Normocephalic and atraumatic        Right Ear: External ear normal       Left Ear: External ear normal       Nose: Nose normal  Mouth/Throat:      Mouth: Mucous membranes are moist       Pharynx: Oropharynx is clear  Eyes:      Conjunctiva/sclera: Conjunctivae normal    Cardiovascular:      Rate and Rhythm: Normal rate and regular rhythm  Pulses: Normal pulses  Heart sounds: Normal heart sounds  No murmur heard  Pulmonary:      Effort: Pulmonary effort is normal  No respiratory distress  Breath sounds: Normal breath sounds  Abdominal:      General: Abdomen is protuberant  Palpations: Abdomen is soft  Tenderness: There is no abdominal tenderness  There is no right CVA tenderness, left CVA tenderness, guarding or rebound  Negative signs include Sawant's sign and McBurney's sign  Comments: Gravid abdomen no tenderness or rigidity  Genitourinary:     General: Normal vulva  Exam position: Lithotomy position  Pubic Area: No rash or pubic lice  Vagina: Normal  No bleeding  Cervix: Normal       Adnexa: Right adnexa normal and left adnexa normal         Right: No tenderness  Left: No tenderness  Comments: Cervix long, closed, thin  Musculoskeletal:         General: No swelling  Cervical back: Neck supple  Skin:     General: Skin is warm and dry  Capillary Refill: Capillary refill takes less than 2 seconds  Neurological:      Mental Status: She is alert     Psychiatric:         Mood and Affect: Mood normal          Vital Signs  ED Triage Vitals [03/16/23 1708]   Temperature Pulse Respirations Blood Pressure SpO2   97 6 °F (36 4 °C) 99 16 128/64 98 %      Temp Source Heart Rate Source Patient Position - Orthostatic VS BP Location FiO2 (%)   Temporal Monitor -- -- --      Pain Score       10 - Worst Possible Pain           Vitals:    03/16/23 1708 03/16/23 1800   BP: 128/64 105/51   Pulse: 99 96         Visual Acuity      ED Medications  Medications   acetaminophen (TYLENOL) tablet 650 mg (650 mg Oral Given 3/16/23 1802)       Diagnostic Studies  Results Reviewed Procedure Component Value Units Date/Time    Comprehensive metabolic panel [[id number]]  (Abnormal) Collected: 03/16/23 1732    Lab Status: Final result Specimen: Blood from Arm, Left Updated: 03/16/23 1756     Sodium 137 mmol/L      Potassium 3 7 mmol/L      Chloride 104 mmol/L      CO2 24 mmol/L      ANION GAP 9 mmol/L      BUN 5 mg/dL      Creatinine 0 56 mg/dL      Glucose 114 mg/dL      Calcium 9 3 mg/dL      AST 11 U/L      ALT 8 U/L      Alkaline Phosphatase 93 U/L      Total Protein 6 6 g/dL      Albumin 3 5 g/dL      Total Bilirubin 0 29 mg/dL      eGFR 118 ml/min/1 73sq m     Narrative:      [place] guidelines for Chronic Kidney Disease (CKD):   •  Stage 1 with normal or high GFR (GFR > 90 mL/min/1 73 square meters)  •  Stage 2 Mild CKD (GFR = 60-89 mL/min/1 73 square meters)  •  Stage 3A Moderate CKD (GFR = 45-59 mL/min/1 73 square meters)  •  Stage 3B Moderate CKD (GFR = 30-44 mL/min/1 73 square meters)  •  Stage 4 Severe CKD (GFR = 15-29 mL/min/1 73 square meters)  •  Stage 5 End Stage CKD (GFR <15 mL/min/1 73 square meters)  Note: GFR calculation is accurate only with a steady state creatinine    Lipase [[id number]]  (Normal) Collected: 03/16/23 1732    Lab Status: Final result Specimen: Blood from Arm, Left Updated: 03/16/23 1756     Lipase 25 u/L     UA w Reflex to Microscopic w Reflex to Culture [[id number]] Collected: 03/16/23 1733    Lab Status: Final result Specimen: Urine, Clean Catch Updated: 03/16/23 1740     Color, UA Yellow     Clarity, UA Clear     Specific Gravity, UA 1 010     pH, UA 7 0     Leukocytes, UA Negative     Nitrite, UA Negative     Protein, UA Negative mg/dl      Glucose, UA Negative mg/dl      Ketones, UA Negative mg/dl      Urobilinogen, UA 0 2 E U /dl      Bilirubin, UA Negative     Occult Blood, UA Negative     URINE COMMENT --    Urine culture [[id number]] Collected: 03/16/23 1733    Lab Status:  In process Specimen: Urine, Clean Catch Updated: 03/16/23 1740    CBC and differential [[id number]]  (Abnormal) Collected: 03/16/23 1732    Lab Status: Final result Specimen: Blood from Arm, Left Updated: 03/16/23 1738     WBC 9 86 Thousand/uL      RBC 4 68 Million/uL      Hemoglobin 12 0 g/dL      Hematocrit 36 8 %      MCV 79 fL      MCH 25 6 pg      MCHC 32 6 g/dL      RDW 15 8 %      MPV 11 3 fL      Platelets 182 Thousands/uL      nRBC 0 /100 WBCs      Neutrophils Relative 67 %      Immat GRANS % 1 %      Lymphocytes Relative 24 %      Monocytes Relative 6 %      Eosinophils Relative 2 %      Basophils Relative 0 %      Neutrophils Absolute 6 64 Thousands/µL      Immature Grans Absolute 0 06 Thousand/uL      Lymphocytes Absolute 2 36 Thousands/µL      Monocytes Absolute 0 55 Thousand/µL      Eosinophils Absolute 0 22 Thousand/µL      Basophils Absolute 0 03 Thousands/µL                  No orders to display              Procedures  Procedures         ED Course                               SBIRT 20yo+    Flowsheet Row Most Recent Value   SBIRT (25 yo +)    In order to provide better care to our patients, we are screening all of our patients for alcohol and drug use  Would it be okay to ask you these screening questions? No Filed at: 03/16/2023 1743                    Medical Decision Making  40-year-old female presents emerged department 32 weeks pregnancy for 1 week of lower abdominal pressure and concerns for wetness in the groin region with patient unsure if coming from urine or amniotic fluid  Denies contraction-like pain  Physical exam and bimanual exam inconsistent with labor  Screening urine and labs to evaluate for other etiologies such as UTI, pyelo-  No signs of UTI  Fetal heart tones normal   Bedside ultrasound performed by myself  Demonstrates good fetal movement, grossly normal anatomy, no evidence of hematoma    Discussed with patient in light of reassuring exam and labs and urine with no signs of labor will discharge with outpatient follow-up with OB/GYN with strict return to ER precautions  Amount and/or Complexity of Data Reviewed  Labs: ordered  Risk  OTC drugs  Disposition  Final diagnoses:   Abdominal pain during pregnancy in third trimester     Time reflects when diagnosis was documented in both MDM as applicable and the Disposition within this note     Time User Action Codes Description Comment    3/16/2023  6:24 PM Mark Julio Add [O26 [id number],  R10 9] Abdominal pain during pregnancy in third trimester       ED Disposition     ED Disposition   Discharge    Condition   Stable    Date/Time   Thu Mar 16, 2023  6:24 PM    [address] [facility] discharge to home/self care  Follow-up Information     Follow up With Specialties Details Why Contact Info Additional Information    Slim Medellin MD Obstetrics and Gynecology, Gynecology, Obstetrics Schedule an appointment as soon as possible for a visit   [address]       [facility] Emergency Department Emergency Medicine Go to  If symptoms worsen [facility] [address] [id number]  [facility] Emergency Department[address], [address]          Patient's Medications   Discharge Prescriptions    No medications on file       No discharge procedures on file      PDMP Review       Value Time User    PDMP Reviewed  Yes 10/13/2021  3:14 PM Linus Self PA-C          ED Provider  Electronically Signed by           Jovanni Gutiérrez DO  03/16/23 9875

## 2023-03-17 LAB — BACTERIA UR CULT: NORMAL

## 2023-03-20 ENCOUNTER — ROUTINE PRENATAL (OUTPATIENT)
Dept: OBGYN CLINIC | Facility: CLINIC | Age: 38
End: 2023-03-20

## 2023-03-20 VITALS — BODY MASS INDEX: 36.03 KG/M2 | SYSTOLIC BLOOD PRESSURE: 118 MMHG | DIASTOLIC BLOOD PRESSURE: 70 MMHG | WEIGHT: 223.2 LBS

## 2023-03-20 DIAGNOSIS — O99.019 SICKLE CELL TRAIT IN MOTHER AFFECTING PREGNANCY (HCC): ICD-10-CM

## 2023-03-20 DIAGNOSIS — O09.522 MULTIGRAVIDA OF ADVANCED MATERNAL AGE IN SECOND TRIMESTER: ICD-10-CM

## 2023-03-20 DIAGNOSIS — Z3A.32 32 WEEKS GESTATION OF PREGNANCY: Primary | ICD-10-CM

## 2023-03-20 DIAGNOSIS — D57.3 SICKLE CELL TRAIT IN MOTHER AFFECTING PREGNANCY (HCC): ICD-10-CM

## 2023-03-21 ENCOUNTER — ULTRASOUND (OUTPATIENT)
Dept: PERINATAL CARE | Facility: OTHER | Age: 38
End: 2023-03-21

## 2023-03-21 VITALS
BODY MASS INDEX: 35.52 KG/M2 | DIASTOLIC BLOOD PRESSURE: 56 MMHG | HEART RATE: 102 BPM | HEIGHT: 66 IN | SYSTOLIC BLOOD PRESSURE: 122 MMHG | WEIGHT: 221 LBS

## 2023-03-21 DIAGNOSIS — Z36.89 ENCOUNTER FOR ULTRASOUND TO ASSESS FETAL GROWTH: ICD-10-CM

## 2023-03-21 DIAGNOSIS — O09.523 MULTIGRAVIDA OF ADVANCED MATERNAL AGE IN THIRD TRIMESTER: ICD-10-CM

## 2023-03-21 DIAGNOSIS — O35.BXX0 ANOMALY OF HEART OF FETUS AFFECTING PREGNANCY, ANTEPARTUM, SINGLE OR UNSPECIFIED FETUS: Primary | ICD-10-CM

## 2023-03-21 DIAGNOSIS — Z3A.32 32 WEEKS GESTATION OF PREGNANCY: ICD-10-CM

## 2023-03-21 PROBLEM — O09.522 MULTIGRAVIDA OF ADVANCED MATERNAL AGE IN SECOND TRIMESTER: Status: RESOLVED | Noted: 2022-11-01 | Resolved: 2023-03-21

## 2023-03-21 NOTE — PATIENT INSTRUCTIONS
Thank you for choosing us for your  care today  If you have any questions about your ultrasound or care, please do not hesitate to contact us or your primary obstetrician  Some general instructions for your pregnancy are:    Exercise: Aim for 22 minutes per day (150 minutes per week) of regular exercise  Walking is great! Nutrition: Choose healthy sources of calcium, iron, and protein  Learn about Preeclampsia: preeclampsia is a common, serious high blood pressure complication in pregnancy  A blood pressure of 446LATT (systolic or top number) or 64TZII (diastolic or bottom number) is not normal and needs evaluation by your doctor  Aspirin is sometimes prescribed in early pregnancy to prevent preeclampsia in women with risk factors - ask your obstetrician if you should be on this medication  For more resources, visit:  [employer] fi  If you smoke, try to reduce how many cigarettes you smoke or try to quit completely  Do not vape  Other warning signs to watch out for in pregnancy or postpartum: chest pain, obstructed breathing or shortness of breath, seizures, thoughts of hurting yourself or your baby, bleeding, a painful or swollen leg, fever, or headache (see [facility] POST-BIRTH Warning Signs campaign)  If these happen call 911  Itching is also not normal in pregnancy and if you experience this, especially over your hands and feet, potentially worse at night, notify your doctors

## 2023-03-21 NOTE — LETTER
March 23, 2023     Chano Eldridge MD  [address]    Patient: Kevin Otto   YOB: 1985   Date of Visit: 3/21/2023       Dear Dr Zander Orellana:    Thank you for referring Benjamin Aleman to me for evaluation  Below are my notes for this consultation  If you have questions, please do not hesitate to call me  I look forward to following your patient along with you  Sincerely,        Dipti Callejas MD        CC: No Recipients  Dipti Callejas MD  3/23/2023  1:08 AM  Sign when Signing Visit  Kevin Otto has no complaints today  She reports regular fetal movements and does not report any problems  She is here today at [address] [facility] for an ultrasound for growth  Her blood type is 0+  Problem list:  1  Advanced maternal age with normal NIPT and normal Glucola screen  Ultrasound findings: The ultrasound today shows normal interval fetal growth and fluid  Today there is fluid within the pericardium that measures about 6 mm  This fluid does not extend below the atrium and no cardiac malformations are detected  Velocities across the valves appear normal   No other signs of hydrops are seen  There is no evidence for fetal anemia by mca dopplers  There is no other evidence for fetal infection  Pregnancy ultrasound has limitations and is unable to detect all forms of fetal congenital abnormalities  The inaccuracy in the EFW can be off by 1 lb either way in the third trimester  Specific counseling was provided on the following problems:  1  I suspect this paracardial effusion may be a normal variant  Follow up recommended:   1  Recommend a 4 week US to review growth and this pericardial effusion  Reviewed her study with Dr Kailash Lara from pediatric cardiology who reviewed her fetal echo and did not feel she need an echo in his office      Pre visit time reviewing her records   10 minutes  Face to face time 20 minutes  Post visit time on documentation of note, updating her problem list, adding orders and prescriptions 10 minutes  Procedures that were completed today were charged separately  The level of decision making was moderate complexity      Simone Bush MD

## 2023-03-21 NOTE — PROGRESS NOTES
David Macias has no complaints today  She reports regular fetal movements and does not report any problems  She is here today at [address] [facility] for an ultrasound for growth  Her blood type is 0+  Problem list:  1  Advanced maternal age with normal NIPT and normal Glucola screen  Ultrasound findings: The ultrasound today shows normal interval fetal growth and fluid  Today there is fluid within the pericardium that measures about 6 mm  This fluid does not extend below the atrium and no cardiac malformations are detected  Velocities across the valves appear normal   No other signs of hydrops are seen  There is no evidence for fetal anemia by mca dopplers  There is no other evidence for fetal infection  Pregnancy ultrasound has limitations and is unable to detect all forms of fetal congenital abnormalities  The inaccuracy in the EFW can be off by 1 lb either way in the third trimester  Specific counseling was provided on the following problems:  1  I suspect this paracardial effusion may be a normal variant  Follow up recommended:   1  Recommend a 4 week US to review growth and this pericardial effusion  Reviewed her study with Dr Hernando Weathers from pediatric cardiology who reviewed her fetal echo and did not feel she need an echo in his office  Pre visit time reviewing her records   10 minutes  Face to face time 20 minutes  Post visit time on documentation of note, updating her problem list, adding orders and prescriptions 10 minutes  Procedures that were completed today were charged separately  The level of decision making was moderate complexity      Kimberly Armijo MD

## 2023-03-31 ENCOUNTER — HOSPITAL ENCOUNTER (OUTPATIENT)
Facility: HOSPITAL | Age: 38
Discharge: HOME | End: 2023-03-31
Attending: OBSTETRICS & GYNECOLOGY | Admitting: OBSTETRICS & GYNECOLOGY

## 2023-03-31 VITALS
TEMPERATURE: 98.1 F | DIASTOLIC BLOOD PRESSURE: 61 MMHG | HEART RATE: 91 BPM | OXYGEN SATURATION: 100 % | RESPIRATION RATE: 18 BRPM | WEIGHT: 225.31 LBS | BODY MASS INDEX: 36.37 KG/M2 | SYSTOLIC BLOOD PRESSURE: 107 MMHG

## 2023-03-31 LAB
BACTERIA UR QL AUTO: ABNORMAL /HPF
BILIRUB UR QL STRIP: NEGATIVE
CLARITY UR: CLEAR
COLOR UR: YELLOW
GLUCOSE UR STRIP-MCNC: NEGATIVE MG/DL
HGB UR QL STRIP.AUTO: NEGATIVE
KETONES UR STRIP-MCNC: NEGATIVE MG/DL
LEUKOCYTE ESTERASE UR QL STRIP: ABNORMAL
NITRITE UR QL STRIP: NEGATIVE
NON-SQ EPI CELLS URNS QL MICRO: ABNORMAL /HPF
PH UR STRIP.AUTO: 6 [PH]
PROT UR STRIP-MCNC: NEGATIVE MG/DL
RBC #/AREA URNS AUTO: ABNORMAL /HPF
SP GR UR STRIP.AUTO: 1.02 (ref 1–1.03)
UROBILINOGEN UR QL STRIP.AUTO: 0.2 E.U./DL
WBC #/AREA URNS AUTO: ABNORMAL /HPF

## 2023-03-31 NOTE — PROGRESS NOTES
L&D Triage Note - OB/GYN  Nabeel Solares 44 y o  female MRN: [id number]  Unit/Bed#: L&D 325-01 Encounter: [id number]      ASSESSMENT:    Nabeel Solares is a 44 y o  [id number] at 33w6d presenting with pelvic cramping  R/o PTL neg, no infection present  UA pending  Patient is dehydrated, encouraged PO hydration  Appropriate for discharge  PLAN:    1) R/o PTL  -SVE cl/th/high  -NST reactive  -No infection on slides  -Urine is dark  2) Continue routine prenatal care  3) Discharge from [facility] triage with  labor precautions    - Reviewed rupture of membranes, false vs true labor, decreased fetal movement, and vaginal bleeding   - Pt to call provider with any concerns and follow up at her next scheduled prenatal appointment    - Case discussed with Dr Katelyn Freitas:    Nabeel Solares 44 y o  [id number] at [address] with an Estimated Date of Delivery: 23 presenting to triage with pelvic cramping since Monday  No leakage of fluid, blood, feels normal fetal movement  Is nervous owing to 2 previous losses during early first trimester      Her current obstetrical history is significant for Sickle cell trait    Her past obstetrical history is significant for 2 early first trimester losses  OBJECTIVE:    Vitals:    23 1100   BP: 107/61   Pulse: 91   Resp:    Temp:    SpO2:        ROS:  Constitutional: Negative  Respiratory: Negative  Cardiovascular: Negative    Gastrointestinal: Negative    General Physical Exam:  General: in no apparent distress  Cardiovascular: No murmurs  Lungs: non-labored breathing  Abdomen: abdomen is soft without significant tenderness, masses, organomegaly or guarding  Lower extremeties: nontender    Cervical Exam  Speculum: Cervical os is closed, presence of physiologic discharge    SVE:   Cervical Dilation: Closed  Cervical Effacement: 0  Fetal Station: Ballotable  Method: Manual  OB Examiner: Benson    FHT:  Baseline Rate: 145 bpm  Variability: Moderate 6-25 bpm  Accelerations: 15 x 15 or greater  Decelerations: None    TOCO:   Contraction Frequency (minutes): irritability  Contraction Duration (seconds): n/a  Contraction Quality: Mild    Lab Results   Component Value Date    WBC 9 86 03/16/2023    HGB 12 0 03/16/2023    HCT 36 8 03/16/2023     03/16/2023     Lab Results   Component Value Date    K 3 7 03/16/2023     03/16/2023    CO2 24 03/16/2023    BUN 5 03/16/2023    CREATININE 0 56 (L) 03/16/2023    AST 11 (L) 03/16/2023    ALT 8 03/16/2023       KOH/WTMT:     Infection:   - no clue cells    - no hyphae   - no trichomonads present    Membrane status   - no ferning   - yellow nitrazene   - neg pooling     Darryle Lesser, MD,  OBGYN PGY-1  3/31/2023 11:52 AM

## 2023-04-01 LAB — BACTERIA UR CULT: NORMAL

## 2023-04-03 ENCOUNTER — ROUTINE PRENATAL (OUTPATIENT)
Dept: OBGYN CLINIC | Facility: CLINIC | Age: 38
End: 2023-04-03

## 2023-04-03 VITALS — DIASTOLIC BLOOD PRESSURE: 84 MMHG | WEIGHT: 226 LBS | BODY MASS INDEX: 36.48 KG/M2 | SYSTOLIC BLOOD PRESSURE: 128 MMHG

## 2023-04-03 DIAGNOSIS — O09.523 MULTIGRAVIDA OF ADVANCED MATERNAL AGE IN THIRD TRIMESTER: ICD-10-CM

## 2023-04-03 DIAGNOSIS — Z3A.34 34 WEEKS GESTATION OF PREGNANCY: Primary | ICD-10-CM

## 2023-04-03 DIAGNOSIS — O35.BXX0 ANOMALY OF HEART OF FETUS AFFECTING PREGNANCY, ANTEPARTUM, SINGLE OR UNSPECIFIED FETUS: ICD-10-CM

## 2023-04-03 NOTE — PATIENT INSTRUCTIONS
Kick Counts in Pregnancy   WHAT YOU NEED TO KNOW:   Kick counts measure how much your baby is moving in your womb  A kick from your baby can be felt as a twist, turn, swish, roll, or jab  It is common to feel your baby kicking at 26 to 28 weeks of pregnancy  You may feel your baby kick as early as 20 weeks of pregnancy  You may want to start counting at 28 weeks  DISCHARGE INSTRUCTIONS:   Contact your doctor immediately if:   You feel a change in the number of kicks or movements of your baby  You feel fewer than 10 kicks within 2 hours  You have questions or concerns about your baby's movements  Why measure kick counts:  Your baby's movement may provide information about your baby's health  He or she may move less, or not at all, if there are problems  Your baby may move less if he or she is not getting enough oxygen or nutrition from the placenta  Do not smoke while you are pregnant  Smoking decreases the amount of oxygen that gets to your baby  Talk to your healthcare provider if you need help to quit smoking  Tell your healthcare provider as soon as you feel a change in your baby's movements  When to measure kick counts:   Measure kick counts at the same time every day  Measure kick counts when your baby is awake and most active  Your baby may be most active in the evening  How to measure kick counts:  Check that your baby is awake before you measure kick counts  You can wake up your baby by lightly pushing on your belly, walking, or drinking something cold  Your healthcare provider may tell you different ways to measure kick counts  You may be told to do the following:  Use a chart or clock to keep track of the time you start and finish counting  Sit in a chair or lie on your left side  Place your hands on the largest part of your belly  Count until you reach 10 kicks  Write down how much time it takes to count 10 kicks  It may take 30 minutes to 2 hours to count 10 kicks  It should not take more than 2 hours to count 10 kicks  Follow up with your doctor as directed:  Write down your questions so you remember to ask them during your visits  © Copyright Jayesh Llanos 2022 Information is for End User's use only and may not be sold, redistributed or otherwise used for commercial purposes  The above information is an  only  It is not intended as medical advice for individual conditions or treatments  Talk to your doctor, nurse or pharmacist before following any medical regimen to see if it is safe and effective for you

## 2023-04-03 NOTE — PROGRESS NOTES
Deniz Carter is a 44y o  year old  at 34w2d for routine prenatal visit    + FM, no vaginal bleeding, contractions, or LOF  Complaints: Yes - feeling her nose stuffy  - we discussed flonase / humidifier and zyrtec  Most recent ultrasound and labs reviewed  - pericardial effusion with normal echo- see note from [facility] / has follow up scheduled at [facility] for  36 weeks   [address] and labor precautions reviewed   GBS next visit

## 2023-04-24 ENCOUNTER — ROUTINE PRENATAL (OUTPATIENT)
Dept: OBGYN CLINIC | Facility: CLINIC | Age: 38
End: 2023-04-24

## 2023-04-24 VITALS — SYSTOLIC BLOOD PRESSURE: 128 MMHG | BODY MASS INDEX: 37.28 KG/M2 | DIASTOLIC BLOOD PRESSURE: 78 MMHG | WEIGHT: 231 LBS

## 2023-04-24 DIAGNOSIS — O09.523 MULTIGRAVIDA OF ADVANCED MATERNAL AGE IN THIRD TRIMESTER: ICD-10-CM

## 2023-04-24 DIAGNOSIS — D57.3 SICKLE CELL TRAIT IN MOTHER AFFECTING PREGNANCY (HCC): Primary | ICD-10-CM

## 2023-04-24 DIAGNOSIS — O99.019 SICKLE CELL TRAIT IN MOTHER AFFECTING PREGNANCY (HCC): Primary | ICD-10-CM

## 2023-04-24 DIAGNOSIS — Z3A.37 37 WEEKS GESTATION OF PREGNANCY: ICD-10-CM

## 2023-04-24 PROBLEM — Z3A.36 36 WEEKS GESTATION OF PREGNANCY: Status: ACTIVE | Noted: 2022-11-28

## 2023-04-24 NOTE — PROGRESS NOTES
Routine Prenatal Visit  [facility] [address] [place], [place]    Assessment/Plan:  Maverick Keene is a 44y o  year old [id number] at 37w2d who presents for routine prenatal visit  1  Sickle cell trait in mother affecting pregnancy ([address] )    2  Multigravida of advanced maternal age in third trimester    3  37 weeks gestation of pregnancy  Assessment & Plan:  - GBS negative  - Vertex by Ultrasound today  - Delivery Plan: Desires elective IOL at 39 weeks -2023 in AM if available  Message sent to    - Feeding: Breastfeeding, pump ordered today  - Pediatrician: Qamar Barton  - Immunizations: s/p Tdap  - Postpartum Contraception: tubal sterilization, MA-31 form signed today  - Perineal massage education reviewed  - Fetal kick counts and labor precautions reviewed  Subjective:     CC: Prenatal care    Rachel Singleton is a 44 y o  [id number] female who presents for routine prenatal care at 37w2d  Pregnancy ROS: Denies leakage of fluid, pelvic pain, or vaginal bleeding  Reports normal fetal movement      The following portions of the patient's history were reviewed and updated as appropriate: allergies, current medications, past family history, past medical history, obstetric history, gynecologic history, past social history, past surgical history and problem list       Objective:  /78   Wt 105 kg (231 lb)   LMP 2022   BMI 37 28 kg/m²   Pregravid Weight/BMI: 83 9 kg (185 lb) (BMI 29 87)  Current Weight: 105 kg (231 lb)   Total Weight Gain: 20 9 kg (46 lb)   Pre- Vitals    Flowsheet Row Most Recent Value   Prenatal Assessment    Fetal Heart Rate 160   Movement Present   Prenatal Vitals    Blood Pressure 128/78   Weight - Scale 105 kg (231 lb)   Urine Albumin/Glucose    Dilation/Effacement/Station    Vaginal Drainage    Draining Fluid No   Edema    LLE Edema Trace   RLE Edema Trace   Facial Edema None           General: Well appearing, no distress  Respiratory: Unlabored breathing  Cardiovascular: Regular rate  Abdomen: Soft, gravid, nontender  Fundal Height: Appropriate for gestational age  Extremities: Warm and well perfused  Non tender

## 2023-04-24 NOTE — ASSESSMENT & PLAN NOTE
- GBS negative  - Vertex by Ultrasound today  - Delivery Plan: Desires elective IOL at 39 weeks -05/06/2023 in AM if available  Message sent to    - Feeding: Breastfeeding, pump ordered today  - Pediatrician: Linda Amaro  - Immunizations: s/p Tdap  - Postpartum Contraception: tubal sterilization, MA-31 form signed today  - Perineal massage education reviewed  - Fetal kick counts and labor precautions reviewed

## 2023-04-28 ENCOUNTER — TELEPHONE (OUTPATIENT)
Dept: OBGYN CLINIC | Facility: MEDICAL CENTER | Age: 38
End: 2023-04-28

## 2023-04-28 NOTE — TELEPHONE ENCOUNTER
Left detailed message for patient to inform her that induction is scheduled on 5/6/2023 at 7AM  If she had any questions to contact the office

## 2023-05-01 ENCOUNTER — ROUTINE PRENATAL (OUTPATIENT)
Dept: OBGYN CLINIC | Facility: CLINIC | Age: 38
End: 2023-05-01

## 2023-05-01 VITALS — BODY MASS INDEX: 36.8 KG/M2 | SYSTOLIC BLOOD PRESSURE: 108 MMHG | DIASTOLIC BLOOD PRESSURE: 70 MMHG | WEIGHT: 228 LBS

## 2023-05-01 DIAGNOSIS — D57.3 SICKLE CELL TRAIT IN MOTHER AFFECTING PREGNANCY (HCC): ICD-10-CM

## 2023-05-01 DIAGNOSIS — D57.3 SICKLE CELL TRAIT (HCC): ICD-10-CM

## 2023-05-01 DIAGNOSIS — Z3A.38 38 WEEKS GESTATION OF PREGNANCY: ICD-10-CM

## 2023-05-01 DIAGNOSIS — O09.523 MULTIGRAVIDA OF ADVANCED MATERNAL AGE IN THIRD TRIMESTER: Primary | ICD-10-CM

## 2023-05-01 DIAGNOSIS — O99.019 SICKLE CELL TRAIT IN MOTHER AFFECTING PREGNANCY (HCC): ICD-10-CM

## 2023-05-01 NOTE — PATIENT INSTRUCTIONS
Fetal Movement   WHAT YOU NEED TO KNOW:   Fetal movements are the kicks, rolls, and hiccups of your unborn baby  You may start to feel these movements when you are 20 weeks pregnant  The movements grow stronger and more frequent as your baby grows  Fetal movements show that your unborn baby is getting the oxygen and nutrients he or she needs before birth  Fewer fetal movements may signal a problem with your baby's health  DISCHARGE INSTRUCTIONS:   Follow up with your doctor or obstetrician as directed:  Write down your questions so you remember to ask them during your visits  Normal fetal movement:  Fetal activity can be described by 4 states, from least to most active  During quiet sleep, your unborn baby may be still for up to 2 hours  During active sleep, he or she kicks, rolls, and moves often  During the quiet awake state, he or she may only move his or her eyes  The active awake state includes strong kicks and rolls  What affects fetal movement:  You may feel your baby move more after you eat, or after you drink caffeine  You may feel your baby move less while you are more active, such as when you exercise  You may also feel fewer movements if you are obese  Certain medicines can change your baby's movements  Tell your healthcare provider about the medicines you are taking  Track fetal movements at home:  Fetal movement is most often felt when you lie quietly on your side  Your healthcare provider may ask you to count movements for 2 hours  He or she may ask you to track how long it takes for your baby to move 10 times  Keep a log of your baby's movements  Contact your doctor or obstetrician if:   It takes longer than usual to feel 8 of your unborn baby's movements  You do not feel your unborn baby move at least 10 times in 2 hours  The skin on your hands, feet, and around your eyes is more swollen than usual     You have a headache for at least 24 hours      Tiny red dots appear on your skin     Your belly is tender when you press on it  You have questions or concerns about your condition or care  Return to the emergency department if:   You do not feel your unborn baby move for 12 hours  You feel cramping or constant pain in your abdomen  You have heavy bleeding from your vagina  You have a severe headache and cannot see clearly  You are having trouble breathing or are vomiting  You have a seizure  © Copyright [employer] 2022 Information is for End User's use only and may not be sold, redistributed or otherwise used for commercial purposes  The above information is an  only  It is not intended as medical advice for individual conditions or treatments  Talk to your doctor, nurse or pharmacist before following any medical regimen to see if it is safe and effective for you  Early Labor Signs   WHAT YOU NEED TO KNOW:   Early labor signs can happen weeks, days, or hours before your baby is ready to be delivered  You may have false labor signs, which are also called [place] contractions  False labor is common and may happen several weeks or days before your actual labor  The contractions are not regular, and do not get closer together  The pain is usually mild, does not worsen, and is felt only in front  [place] contractions may happen later in the day, and stop after you change position, walk, or rest   DISCHARGE INSTRUCTIONS:   Call 911 for any of the following: You have heavy vaginal bleeding  You cannot get to the hospital before the baby starts to come out  Return to the emergency department if:   You have regular, painful contractions that are less than 5 minutes apart and last 30 to 70 seconds each  You have a constant trickle or sudden gush of clear fluid from your vagina  You notice a sudden decrease in your baby's movement      Contact your obstetrician or healthcare provider if:   You have pain in your lower back or abdomen that does not get better when you change positions  You have bloody mucus or show  You have questions or concerns about your condition or care  Early Labor signs and symptoms:   Lightening  occurs when your baby drops inside your pelvis  You may feel increased pressure in your pelvis  This may happen a few weeks to a few hours before your labor begins  Contractions  are cramps and tightening that occur in your uterus to help move the baby through your birth canal  Contractions occur regularly and more often each time  Each one lasts about 30 to 70 seconds, and gets stronger until you deliver your baby  Contractions do not go away with movement  The pain usually starts in your lower back and moves to your abdomen  Effacement  occurs when your cervix softens and thins, so it can easily open for the baby  You will not be able to feel effacement  Your healthcare provider will examine your cervix for effacement  Dilation  is widening of your cervix  Your healthcare provider will examine your cervix for dilation  Your cervix may start to dilate weeks before your baby is delivered  Your cervix will be fully opened and ready for delivery when it is dilated to 10 centimeters  Increased discharge  from your vagina may occur  It may be brown, pink, clear, or slightly bloody  This discharge may also be called bloody show  Bloody show is a mucus plug that forms and blocks your cervix during pregnancy  The discharge may mean that your cervix is opening up and getting ready for delivery  Rupture of membranes  is a sudden release of clear fluid from your vagina  Ruptured membranes means your water broke  Your healthcare provider may need to break your water if it does not happen on its own  © Copyright [facility] 2022 Information is for End User's use only and may not be sold, redistributed or otherwise used for commercial purposes  The above information is an  only   It is not intended as medical advice for individual conditions or treatments  Talk to your doctor, nurse or pharmacist before following any medical regimen to see if it is safe and effective for you

## 2023-05-01 NOTE — PROGRESS NOTES
Routine Prenatal Visit  [facility] [address] [place], [place]    Assessment/Plan:  Daniela is a 44y o  year old [id number] at 38w2d who presents for routine prenatal visit  3  Multigravida of advanced maternal age in third trimester    2  Sickle cell trait in mother affecting pregnancy ([address] )    3  Sickle cell trait (HCC)    4  38 weeks gestation of pregnancy          Subjective:     CC: Prenatal care    Marcos Harvey is a 44 y o  [id number] female who presents for routine prenatal care at 38w2d  Pregnancy ROS: no leakage of fluid, pelvic pain, or vaginal bleeding   good fetal movement  Scheduled for IOL 23    The following portions of the patient's history were reviewed and updated as appropriate: allergies, current medications, past family history, past medical history, obstetric history, gynecologic history, past social history, past surgical history and problem list       Objective:  /70   Wt 103 kg (228 lb)   LMP 2022   BMI 36 80 kg/m²   Pregravid Weight/BMI: 83 9 kg (185 lb) (BMI 29 87)  Current Weight: 103 kg (228 lb)   Total Weight Gain: 19 5 kg (43 lb)   Pre- Vitals    Flowsheet Row Most Recent Value   Prenatal Assessment    Fetal Heart Rate 156   Movement Present   Prenatal Vitals    Blood Pressure 108/70   Weight - Scale 103 kg (228 lb)   Urine Albumin/Glucose    Dilation/Effacement/Station    Cervical Dilation 3   Cervical Effacement 60   Fetal Station -2   Vaginal Drainage    Edema    LLE Edema Trace   RLE Edema Trace   Facial Edema None           General: Well appearing, no distress  Respiratory: Unlabored breathing  Cardiovascular: Regular rate  Abdomen: Soft, gravid, nontender  Fundal Height: Appropriate for gestational age  Extremities: Warm and well perfused  Non tender

## 2023-05-03 ENCOUNTER — TELEPHONE (OUTPATIENT)
Dept: OBGYN CLINIC | Facility: MEDICAL CENTER | Age: 38
End: 2023-05-03

## 2023-05-03 ENCOUNTER — HOSPITAL ENCOUNTER (OUTPATIENT)
Facility: HOSPITAL | Age: 38
Discharge: HOME | End: 2023-05-03
Attending: OBSTETRICS & GYNECOLOGY | Admitting: OBSTETRICS & GYNECOLOGY

## 2023-05-03 VITALS
RESPIRATION RATE: 18 BRPM | TEMPERATURE: 98.9 F | BODY MASS INDEX: 36.64 KG/M2 | DIASTOLIC BLOOD PRESSURE: 77 MMHG | HEART RATE: 106 BPM | WEIGHT: 228 LBS | SYSTOLIC BLOOD PRESSURE: 119 MMHG | HEIGHT: 66 IN

## 2023-05-03 PROBLEM — Z3A.38 38 WEEKS GESTATION OF PREGNANCY: Status: ACTIVE | Noted: 2022-11-28

## 2023-05-03 NOTE — TELEPHONE ENCOUNTER
Patient called into office at 39 weeks pregnant with c/o brown discharge and lower back pain  Patient denies loss of fluid and frequent contractions  Spoke with office nurse and Dr Liz Espino who suggested patient be evaluated at L&D  Patient agreed to treatment plan, L&D contacted

## 2023-05-03 NOTE — PROGRESS NOTES
L&D Triage Note - OB/GYN  Kerry Uriarte 44 y o  female MRN: [id number]  Unit/Bed#: L&D 325-01 Encounter: [id number]      ASSESSMENT:    Kerry Uriarte is a 44 y o  [id number] at 38w4d presents with back pain and mucous discharge  SVE remains unchanged from previous exam and on 2 hr recheck  Low suspicion for labor at this time  Patient suitable for discharge home with precautions  PLAN:    1) rule out labor   -SVE: 360/-2, unchanged after 2 hours   -[place]: irregular and mild   -FHT: 140s    2) Continue routine prenatal care  3) Discharge from [facility] triage with term labor precautions    - Reviewed rupture of membranes, false vs true labor, decreased fetal movement, and vaginal bleeding   - Pt to call provider with any concerns and follow up at her next scheduled prenatal appointment    - Case discussed with Dr Jerris Jeans:    Kerry Uriarte 44 y o  [id number] at 38w4d with an Estimated Date of Delivery: 23 She comes in after having back pain  It is coming and going like contractions, but not yet at a regular interval  She has also had some mucous discharge  She denies leaking fluid or vaginal bleeding  Endorses good fetal movement      Her current obstetrical history is significant for fetal cardiac anomaly, sickle cell trait    Her past obstetrical history is significant for 2 previous term     Contractions: back pain  Leakage of fluid: denies  Vaginal Bleeding: denies  Fetal movement: present    OBJECTIVE:    Vitals:    23 1155   BP: 119/77   Pulse: (!) 106   Resp: 18   Temp: 98 9 °F (37 2 °C)       ROS:  Constitutional: Negative  Respiratory: Negative  Cardiovascular: Negative    Gastrointestinal: Negative    General Physical Exam:  General: non-toxic, alert and cooperative  Cardiovascular: Cor RRR  Lungs: non-labored breathing  Abdomen: abdomen is soft without significant tenderness, masses, organomegaly or guarding  Lower extremeties: nontender    Cervical Exam  SVE: 3 / 60% / -2, unchanged after 2 hours    Fetal monitoring:  FHT:  140 bpm/ Moderate 6 - 25 bpm / 15 x 15 accelerations present, no decelerations  [place]: irregular mild     Ruben Doshi MD  OBGYN PGY-1  5/3/2023 3:19 PM

## 2023-05-05 ENCOUNTER — TELEPHONE (OUTPATIENT)
Dept: OBGYN CLINIC | Facility: MEDICAL CENTER | Age: 38
End: 2023-05-05

## 2023-05-05 ENCOUNTER — ANESTHESIA EVENT (INPATIENT)
Dept: ANESTHESIOLOGY | Facility: HOSPITAL | Age: 38
End: 2023-05-05

## 2023-05-05 ENCOUNTER — HOSPITAL ENCOUNTER (INPATIENT)
Facility: HOSPITAL | Age: 38
LOS: 1 days | Discharge: HOME | End: 2023-05-06
Attending: OBSTETRICS & GYNECOLOGY | Admitting: OBSTETRICS & GYNECOLOGY

## 2023-05-05 ENCOUNTER — ANESTHESIA (INPATIENT)
Dept: ANESTHESIOLOGY | Facility: HOSPITAL | Age: 38
End: 2023-05-05

## 2023-05-05 DIAGNOSIS — Z3A.38 38 WEEKS GESTATION OF PREGNANCY: ICD-10-CM

## 2023-05-05 LAB
ABO GROUP BLD: NORMAL
BASE EXCESS BLDCOA CALC-SCNC: -1 MMOL/L (ref 3–11)
BASE EXCESS BLDCOV CALC-SCNC: 0.6 MMOL/L (ref 1–9)
BLD GP AB SCN SERPL QL: NEGATIVE
ERYTHROCYTE [DISTWIDTH] IN BLOOD BY AUTOMATED COUNT: 19.4 % (ref 11.6–15.1)
HCO3 BLDCOA-SCNC: 25.6 MMOL/L (ref 17.3–27.3)
HCO3 BLDCOV-SCNC: 25.6 MMOL/L (ref 12.2–28.6)
HCT VFR BLD AUTO: 39.6 % (ref 34.8–46.1)
HGB BLD-MCNC: 12.7 G/DL (ref 11.5–15.4)
HOLD SPECIMEN: NORMAL
MCH RBC QN AUTO: 24.9 PG (ref 26.8–34.3)
MCHC RBC AUTO-ENTMCNC: 32.1 G/DL (ref 31.4–37.4)
MCV RBC AUTO: 78 FL (ref 82–98)
O2 CT VFR BLDCOA CALC: 5.5 ML/DL
OXYHGB MFR BLDCOA: 25.1 %
OXYHGB MFR BLDCOV: 69.5 %
PCO2 BLDCOA: 49.5 MM[HG] (ref 30–60)
PCO2 BLDCOV: 42.2 MM HG (ref 27–43)
PH BLDCOA: 7.33 [PH] (ref 7.23–7.43)
PH BLDCOV: 7.4 [PH] (ref 7.19–7.49)
PLATELET # BLD AUTO: 314 THOUSANDS/UL (ref 149–390)
PMV BLD AUTO: 11.2 FL (ref 8.9–12.7)
PO2 BLDCOA: 14.7 MM HG (ref 5–25)
PO2 BLDCOV: 29.3 MM HG (ref 15–45)
RBC # BLD AUTO: 5.11 MILLION/UL (ref 3.81–5.12)
RH BLD: POSITIVE
SAO2 % BLDCOV: 16 ML/DL
SPECIMEN EXPIRATION DATE: NORMAL
WBC # BLD AUTO: 11.09 THOUSAND/UL (ref 4.31–10.16)

## 2023-05-05 PROCEDURE — 3E033VJ INTRODUCTION OF OTHER HORMONE INTO PERIPHERAL VEIN, PERCUTANEOUS APPROACH: ICD-10-PCS | Performed by: OBSTETRICS & GYNECOLOGY

## 2023-05-05 PROCEDURE — 10907ZC DRAINAGE OF AMNIOTIC FLUID, THERAPEUTIC FROM PRODUCTS OF CONCEPTION, VIA NATURAL OR ARTIFICIAL OPENING: ICD-10-PCS | Performed by: OBSTETRICS & GYNECOLOGY

## 2023-05-05 PROCEDURE — 4A1HXCZ MONITORING OF PRODUCTS OF CONCEPTION, CARDIAC RATE, EXTERNAL APPROACH: ICD-10-PCS | Performed by: OBSTETRICS & GYNECOLOGY

## 2023-05-05 RX ORDER — BUPIVACAINE HYDROCHLORIDE 2.5 MG/ML
30 INJECTION, SOLUTION EPIDURAL; INFILTRATION; INTRACAUDAL ONCE AS NEEDED
Status: DISCONTINUED | OUTPATIENT
Start: 2023-05-05 | End: 2023-05-05

## 2023-05-05 RX ORDER — SIMETHICONE 80 MG
80 TABLET,CHEWABLE ORAL 4 TIMES DAILY PRN
Status: DISCONTINUED | OUTPATIENT
Start: 2023-05-05 | End: 2023-05-06 | Stop reason: HOSPADM

## 2023-05-05 RX ORDER — ROPIVACAINE HYDROCHLORIDE 2 MG/ML
INJECTION, SOLUTION EPIDURAL; INFILTRATION; PERINEURAL CONTINUOUS PRN
Status: DISCONTINUED | OUTPATIENT
Start: 2023-05-05 | End: 2023-05-05 | Stop reason: HOSPADM

## 2023-05-05 RX ORDER — ROPIVACAINE HYDROCHLORIDE 2 MG/ML
INJECTION, SOLUTION EPIDURAL; INFILTRATION; PERINEURAL AS NEEDED
Status: DISCONTINUED | OUTPATIENT
Start: 2023-05-05 | End: 2023-05-05 | Stop reason: HOSPADM

## 2023-05-05 RX ORDER — CALCIUM CARBONATE 200(500)MG
1000 TABLET,CHEWABLE ORAL 3 TIMES DAILY PRN
Status: DISCONTINUED | OUTPATIENT
Start: 2023-05-05 | End: 2023-05-06 | Stop reason: HOSPADM

## 2023-05-05 RX ORDER — DOCUSATE SODIUM 100 MG/1
100 CAPSULE, LIQUID FILLED ORAL 2 TIMES DAILY
Status: DISCONTINUED | OUTPATIENT
Start: 2023-05-05 | End: 2023-05-06 | Stop reason: HOSPADM

## 2023-05-05 RX ORDER — OXYTOCIN/RINGER'S LACTATE 30/500 ML
1-30 PLASTIC BAG, INJECTION (ML) INTRAVENOUS
Status: DISCONTINUED | OUTPATIENT
Start: 2023-05-05 | End: 2023-05-05

## 2023-05-05 RX ORDER — SODIUM CHLORIDE, SODIUM LACTATE, POTASSIUM CHLORIDE, CALCIUM CHLORIDE 600; 310; 30; 20 MG/100ML; MG/100ML; MG/100ML; MG/100ML
125 INJECTION, SOLUTION INTRAVENOUS CONTINUOUS
Status: DISCONTINUED | OUTPATIENT
Start: 2023-05-05 | End: 2023-05-05

## 2023-05-05 RX ORDER — ACETAMINOPHEN 325 MG/1
975 TABLET ORAL EVERY 8 HOURS PRN
Status: DISCONTINUED | OUTPATIENT
Start: 2023-05-05 | End: 2023-05-05

## 2023-05-05 RX ORDER — CALCIUM CARBONATE 200(500)MG
1000 TABLET,CHEWABLE ORAL 3 TIMES DAILY PRN
Status: DISCONTINUED | OUTPATIENT
Start: 2023-05-05 | End: 2023-05-05

## 2023-05-05 RX ORDER — IBUPROFEN 600 MG/1
600 TABLET ORAL EVERY 6 HOURS PRN
Status: DISCONTINUED | OUTPATIENT
Start: 2023-05-05 | End: 2023-05-06 | Stop reason: HOSPADM

## 2023-05-05 RX ORDER — ONDANSETRON 2 MG/ML
4 INJECTION INTRAMUSCULAR; INTRAVENOUS EVERY 6 HOURS PRN
Status: DISCONTINUED | OUTPATIENT
Start: 2023-05-05 | End: 2023-05-05

## 2023-05-05 RX ORDER — ACETAMINOPHEN 325 MG/1
650 TABLET ORAL EVERY 4 HOURS PRN
Status: DISCONTINUED | OUTPATIENT
Start: 2023-05-05 | End: 2023-05-06 | Stop reason: HOSPADM

## 2023-05-05 RX ORDER — DIAPER,BRIEF,INFANT-TODD,DISP
1 EACH MISCELLANEOUS DAILY PRN
Status: DISCONTINUED | OUTPATIENT
Start: 2023-05-05 | End: 2023-05-06 | Stop reason: HOSPADM

## 2023-05-05 RX ORDER — ONDANSETRON 2 MG/ML
4 INJECTION INTRAMUSCULAR; INTRAVENOUS EVERY 8 HOURS PRN
Status: DISCONTINUED | OUTPATIENT
Start: 2023-05-05 | End: 2023-05-06 | Stop reason: HOSPADM

## 2023-05-05 RX ADMIN — SODIUM CHLORIDE, SODIUM LACTATE, POTASSIUM CHLORIDE, AND CALCIUM CHLORIDE 125 ML/HR: .6; .31; .03; .02 INJECTION, SOLUTION INTRAVENOUS at 13:58

## 2023-05-05 RX ADMIN — ROPIVACAINE HYDROCHLORIDE 8 ML/HR: 2 INJECTION, SOLUTION EPIDURAL; INFILTRATION; PERINEURAL at 16:23

## 2023-05-05 RX ADMIN — ROPIVACAINE HYDROCHLORIDE 5 ML: 2 INJECTION, SOLUTION EPIDURAL; INFILTRATION at 16:17

## 2023-05-05 RX ADMIN — SODIUM CHLORIDE, SODIUM LACTATE, POTASSIUM CHLORIDE, AND CALCIUM CHLORIDE 125 ML/HR: .6; .31; .03; .02 INJECTION, SOLUTION INTRAVENOUS at 12:01

## 2023-05-05 RX ADMIN — DOCUSATE SODIUM 100 MG: 100 CAPSULE, LIQUID FILLED ORAL at 19:34

## 2023-05-05 RX ADMIN — ROPIVACAINE HYDROCHLORIDE 5 ML: 2 INJECTION, SOLUTION EPIDURAL; INFILTRATION at 16:20

## 2023-05-05 RX ADMIN — OXYTOCIN 2 MILLI-UNITS/MIN: 10 INJECTION INTRAVENOUS at 14:24

## 2023-05-05 NOTE — DISCHARGE SUMMARY
Discharge Summary - Jo Ann Jacob 44 y o  female MRN: [id number]    Unit/Bed#: L&D 325-01 Encounter: [id number]    Admission Date: 2023     Discharge Date: 23    Admitting Attending: Toussaint-Foster  Delivering Attending: Toussaint-Foster  Discharge Attending: [place]    Predelivery Diagnosis:  1  Pregnancy at 38 weeks  2  Fetal cardia anomaly with normal US  3  Sickle cell trait    Postdelivery Diagnosis:  1  Same as above  2  Delivery of term     Procedures: Spontaneous Vaginal Delivery    Complications: none apparent    Hospital Course:   Ms Jo Ann Jacob is a 44 y o  [id number] at 38wk6d  She presented to labor and delivery for brown vaginal discharge and was noted to have decelerations on monitor, prompting recommendation for induction of labor  She was started on pitocin given SVE 3/-2 on admission  AROM was performed for clear fluid  FHT was intermittently category II  She continued to make cervical change to completely dilated and began pushing  She delivered via vaginal delivery with no perineal lacerations  She had an uncomplicated postpartum course  Condition at discharge: good     On day of discharge, patient was tolerating PO, passing flatus, urinating, and ambulating  Her pain was well controlled with oral analgesics  She was discharged home on postpartum day #1 with standard post partum instructions to follow up with her physician in 3-6 weeks for a postpartum appointment  Discharge instructions/Information to patient and family:   - Do not place anything (no partner, tampons or douche) in your vagina for 6 weeks    -You may walk for exercise for the first 6 weeks then gradually return to your usual activities    -Please do not drive for 1 week if you have no stitches and for 2 weeks if you have stitches or underwent a  delivery     -You may take baths or shower per your preference    -Please look at your bust (breasts) in the mirror daily and call for redness or tenderness or increased warmth    -Please call us for temperature > 100 4*F or 38* C, worsening pain or a foul discharge  Discharge Medications:   Please see AVS for full list of medications upon discharge  Provisions for Follow-Up Care: Follow up with your doctor in 3 weeks for postpartum care       Planned Readmission: Ellyn Luo MD  OBGYN Resident  05/06/23  7:40 PM [phone]

## 2023-05-05 NOTE — L&D DELIVERY NOTE
Vaginal Delivery Summary - OB/GYN   Easton Renteria 44 y o  female MRN: [id number]  Unit/Bed#: L&D 308-01 Encounter: [id number]          Predelivery Diagnosis:  1  Pregnancy at 38 weeks  2  Fetal cardia anomaly with normal US  3  Sickle cell trait    Postdelivery Diagnosis:  1  Same as above  2  Delivery of term     Procedure: Spontaneous Vaginal Delivery, no perineal lacerations, left periurethral laceration    Attending: Toussaint-Foster    Assistant: Shaista    Anesthesia: Epidural    QBL: 25 cc  Admission H 7  Admission platelets: 058    Complications: none apparent    Specimens: cord blood, arterial and venous cord blood gasses, placenta to storage    Findings:   1  Viable male at 80, with APGARS of 9 and 9 at 1 and 5 minutes respectively,  2  Spontaneous delivery of intact placenta   3  No perineal lacerations  4  Blood gases:    Umbilical Cord Venous Blood Gas:  Results from last 7 days   Lab Units 23  1827   PH COV  7 400   PCO2 COV mm HG 42 2   HCO3 COV mmol/L 25 6   BASE EXC COV mmol/L 0 6*   O2 CT CD VB mL/dL 16 0   O2 HGB, VENOUS CORD % 53 1     Umbilical Cord Arterial Blood Gas:  Results from last 7 days   Lab Units 23  1827   PH COA  7 332   PCO2 COA  49 5   PO2 COA mm HG 14 7   HCO3 COA mmol/L 25 6   BASE EXC COA mmol/L -1 0*   O2 CONTENT CORD ART ml/dl 5 5   O2 HGB, ARTERIAL CORD % 25 1       Disposition:  Patient tolerated the procedure well and was recovering in labor and delivery room     Brief history and labor course:  Ms Easton Renteria is a 44 y o  [id number] at 38wk6d  She presented to labor and delivery for brown vaginal discharge and was noted to have decelerations on monitor, prompting recommendation for induction of labor  She was started on pitocin given SVE 3/60/-2 on admission  AROM was performed for clear fluid  FHT was intermittently category II   She continued to make cervical change to completely dilated and began pushing  Description of procedure    After pushing for less than a minute, at [address] patient delivered a viable male , wt pending, apgars of 9 (1 min) and 9 (5 min)  The fetal vertex delivered spontaneously  Baby was checked for nuchal, one loose loop noted through which fetus was delivered  The anterior shoulder delivered atraumatically with maternal expulsive forces and the assistance of downward traction  The posterior shoulder delivered with maternal expulsive forces and the assistance of upward traction  The remainder of the fetus delivered spontaneously  Upon delivery, the infant was placed on the mothers abdomen and the cord was clamped and cut  Delayed cord clamping was performed  The infant was noted to cry spontaneously and was moving all extremities appropriately  There was no evidence for injury  Awaiting nurse resuscitators evaluated the   Arterial and venous cord blood gases and cord blood was collected for analysis  These were promptly sent to the lab  In the immediate post-partum, 30 units of IV pitocin was administered, and the uterus was noted to contract down well with massage and pitocin  The placenta delivered spontaneously at 1830 and was noted to have a centrally inserted 3 vessel cord  The vagina, cervix, perineum, and rectum were inspected and there was noted to be no perineal laceration and left periurethral laceration that was hemostatic and did not require repair  At the conclusion of the procedure, all needle, sponge, and instrument counts were noted to be correct  Patient tolerated the procedure well and was allowed to recover in labor and delivery room with family and  before being transferred to the post-partum floor  Dr Jasmin Barajas was present and participated in all key portions of the case      Gretchen Lake MD  2023  11:21 PM

## 2023-05-05 NOTE — ANESTHESIA PROCEDURE NOTES
Epidural Block    Patient location during procedure: OB  Start time: 5/5/2023 4:16 PM  Reason for block: at surgeon's request and primary anesthetic  Staffing  Performed: Anesthesiologist   Anesthesiologist: Joselin Fonseca MD  Preanesthetic Checklist  Completed: patient identified, IV checked, site marked, risks and benefits discussed, surgical consent, monitors and equipment checked, pre-op evaluation and timeout performed  Epidural  Patient position: sitting  Prep: Betadine  Patient monitoring: frequent blood pressure checks  Approach: midline  Location: lumbar  Injection technique: JONATHAN saline  Needle  Needle type: Tuohy   Needle gauge: 18 G  Catheter type: side hole  Catheter size: 20 G  Catheter securement method: clear occlusive dressing  Test dose: negative  Assessment  Sensory level: T10  Number of attempts: 1negative aspiration for CSF, negative aspiration for heme and no paresthesia on injection  patient tolerated the procedure well with no immediate complications

## 2023-05-05 NOTE — OB LABOR/OXYTOCIN SAFETY PROGRESS
Oxytocin Safety Progress Check Note - Court Chiles 44 y o  female MRN: [id number]    Unit/Bed#: L&D 325-01 Encounter: [id number]    Dose (maggie-units/min) Oxytocin: 4 maggie-units/min  Contraction Frequency (minutes): 2-3  Contraction Quality: Moderate  Tachysystole: No   Cervical Dilation: 5-6        Cervical Effacement: 50  Fetal Station: -2  Baseline Rate: 145 bpm  Fetal Heart Rate: 156 BPM  FHR Category: Category II           Vital Signs:   Vitals:    05/05/23 1659   BP: 120/70   Pulse: (!) 112   Temp:        Notes/comments:   Pt is resting comfortably  Urinary catheter placed by nurse  SVE as above  FHT noted to be Cat II with recurrent late decelerations with moderate variability  Recently received IVF bolus  Maternal position changed with improvement in decelerations  Will continue monitoring given continued cervical change and moderate variability      Dr Rene Saini aware     Edith Alvarenga MD 5/5/2023 5:42 PM

## 2023-05-05 NOTE — OB LABOR/OXYTOCIN SAFETY PROGRESS
Labor Progress Note - Melany Tomlinson 44 y o  female MRN: [id number]    Unit/Bed#: L&D 325-01 Encounter: [id number]    Dose (maggie-units/min) Oxytocin: 4 maggie-units/min  Contraction Frequency (minutes): 4-5  Contraction Quality: Mild  Tachysystole: No   Cervical Dilation: 3        Cervical Effacement: 60  Fetal Station: -2  Baseline Rate: 145 bpm  Fetal Heart Rate: 156 BPM  FHR Category: Category II               Vital Signs:   Vitals:    05/05/23 1517   BP: 105/56   Pulse: 92   Temp:        Notes/comments:   Pit started at 1424 (wanted to wait until FOB got off work) and she is doing well  Occasional variable decels that resolve with repositioning  SVE deferred until 4 hrs after pitocin initiation           More Nguyen DO 5/5/2023 3:33 PM

## 2023-05-05 NOTE — TELEPHONE ENCOUNTER
PT called in stating she is having lots of brown discharge, clotting and cramping  PT is 39 weeks and after speaking with on call provider she was advised to got to L&D

## 2023-05-05 NOTE — H&P
\[facility]\"" 44 y o  female MRN: [id number]  Unit/Bed#: L&D 325-01 Encounter: [id number]    Assessment: 44 y o  [id number] at 38w6d admitted for IOL for isolated decelerations in the setting of scheduled eIOL the following morning  Plan:   * 38 weeks gestation of pregnancy  Assessment & Plan  Presented to L&D triage for brown discharge with rare contractions (every 15-20 min)  Jason Dire discharge in setting of recent exam on speculum exam with no evidence of infection or ROM  Several spontaneous decelerations on FHT  Given scheduled eIOL in the morning, decision made to proceed with IOL now for decelerations now  · Admit for IOL for FHR decelerations starting with pitocin  · Routine admission labs (CBC, T&S, syphilis panel)  · Clear liquid diet  · Continuous fetal monitoring  · Analgesia at maternal request    Fetal cardiac anomaly complicating pregnancy, antepartum  Assessment & Plan  Pericardial effusion noted in March with normal US in April  Sickle cell trait in mother affecting pregnancy [facility])  Assessment & Plan  FOB negative  Discussed case and plan w/ Dr Kevin Stoll    Chief Complaint: brown discharge    HPI: Elvie Cervantes is a 44 y o  [id number] with an ANT of 5/13/2023, Date entered prior to episode creation at 38w6d who is being admitted for isolated decelerations in the setting of scheduled eIOL the following morning  She initially presented to L&D triage for brown discharge starting after cervical exam 2 days ago, and isolated decelerations were noted during monitoring  She complains of uterine contractions, occurring every 15-20 minutes, has no LOF, and reports brown discharge but no red bleeding  She states she has felt good FM      Patient Active Problem List   Diagnosis   • PCOS (polycystic ovarian syndrome)   • Sickle cell trait (HCC)   • Gastroesophageal reflux disease without esophagitis   • Sickle cell trait in mother affecting pregnancy [facility])   • Vaccine counseling   • 38 weeks gestation of pregnancy   • Paresthesia and pain of both upper extremities   • Multigravida of advanced maternal age in third trimester   • Fetal cardiac anomaly complicating pregnancy, antepartum       Baby complications/comments: fetal pericardial effusion which self-resolved    Review of Systems   Constitutional: Negative for chills and fever  Eyes: Negative for visual disturbance  Respiratory: Negative for cough and shortness of breath  Cardiovascular: Negative for chest pain and leg swelling  Gastrointestinal: Negative for abdominal pain, diarrhea, nausea and vomiting  Genitourinary: Positive for vaginal discharge (brown)  Negative for dysuria, frequency, hematuria, pelvic pain, urgency and vaginal bleeding  Neurological: Negative for dizziness, light-headedness and headaches         OB Hx:  OB History    Para Term  AB Living   5 2 2   2 2   SAB IAB Ectopic Multiple Live Births   2       2      # Outcome Date GA Lbr New/2nd Weight Sex Delivery Anes PTL Lv   5 Current            4 SAB 2021 4w0d          3 SAB 10/2021 7w0d          2 Term 17 40w0d  3884 g (8 lb 9 oz) M Vag-Spont  N KARLA   1 Term 10/05/04 40w0d  3430 g (7 lb 9 oz) F Vag-Spont   KARLA       Past Medical Hx:  Past Medical History:   Diagnosis Date   • Polycystic ovary syndrome    • Sickle cell trait ([facility] [address] )        Past Surgical hx:  Past Surgical History:   Procedure Laterality Date   • CHOLECYSTECTOMY         Social Hx:  Alcohol use: no  Tobacco use: no  Other substance use: no    Allergies   Allergen Reactions   • Shellfish Allergy - Food Allergy Hives   • Shrimp Extract Allergy Skin Test - Food Allergy Hives     Only when she touches, but she can eat them       Medications Prior to Admission   Medication   • omeprazole (PriLOSEC) 20 mg delayed release capsule   • Prenatal w/o A Vit-Fe Fum-FA (PRENATA PO)       Objective:  Temp:  [98 5 °F (36 9 °C)] 98 5 °F (36 9 °C)  BP: (138)/(83) 138/83  There is no height or weight on file to calculate BMI   bpm    Physical Exam:  Physical Exam  Constitutional:       General: She is not in acute distress  Appearance: Normal appearance  She is not ill-appearing  HENT:      Mouth/Throat:      Mouth: Mucous membranes are moist    Eyes:      Extraocular Movements: Extraocular movements intact  Cardiovascular:      Rate and Rhythm: Normal rate and regular rhythm  Heart sounds: Normal heart sounds  Pulmonary:      Effort: Pulmonary effort is normal       Breath sounds: Normal breath sounds  Abdominal:      General: There is no distension  Palpations: Abdomen is soft  Tenderness: There is no abdominal tenderness  There is no guarding  Musculoskeletal:         General: No swelling  Right lower leg: No edema  Left lower leg: No edema  Neurological:      General: No focal deficit present  Mental Status: She is alert  Skin:     General: Skin is warm and dry  Coloration: Skin is not jaundiced or pale  Psychiatric:         Mood and Affect: Mood normal          Behavior: Behavior normal          Thought Content:  Thought content normal          Judgment: Judgment normal         Speculum exam:  Normal external female genitalia  Cervix fully visualized and not visibly dilated  Moderate amounts of brown blood with no active bleeding or bright red blood noted  No bleeding, pooling, or lesions noted    Microscopy:     Infection:   - no clue cells    - no hyphae   - no trichomonads present    Membrane status   - no ferning   - negative nitrazene   - no pooling     SVE: 3/60/-2 (unchanged from prior)    FHT:  Baseline: 150 bpm  Variability: moderate  Accelerations: present  Decelerations: 2 isolated decelerations  Category 2    TOCO:   Contraction Frequency (minutes): 0  Contraction Duration (seconds): 0  Contraction Quality: Not applicable    Lab Results   Component Value Date    WBC 9 86 03/16/2023 HGB 12 0 03/16/2023    HCT 36 8 03/16/2023     03/16/2023     Lab Results   Component Value Date    K 3 7 03/16/2023     03/16/2023    CO2 24 03/16/2023    BUN 5 03/16/2023    CREATININE 0 56 (L) 03/16/2023    AST 11 (L) 03/16/2023    ALT 8 03/16/2023     Prenatal Labs: Reviewed      Blood type: O positive  Antibody: negative  GBS: negative  HIV: non-reactive  Rubella: immune  RPR: non-reactive  HBsAg: negative  HCAb: negative  Chlamydia: negative  Gonorrhea: negative  Diabetes 1 hour screen: 131  Platelets: 203  Hgb: 13 1  >2 Midnights  INPATIENT     Signature/Title: Jt Mays MD  Date: 5/5/2023  Time: 12:08 PM

## 2023-05-05 NOTE — PLAN OF CARE
Problem: PAIN - ADULT  Goal: Verbalizes/displays adequate comfort level or baseline comfort level  Description: Interventions:  - Encourage patient to monitor pain and request assistance  - Assess pain using appropriate pain scale  - Administer analgesics based on type and severity of pain and evaluate response  - Implement non-pharmacological measures as appropriate and evaluate response  - Consider cultural and social influences on pain and pain management  - Notify physician/advanced practitioner if interventions unsuccessful or patient reports new pain  Outcome: Progressing     Problem: INFECTION - ADULT  Goal: Absence or prevention of progression during hospitalization  Description: INTERVENTIONS:  - Assess and monitor for signs and symptoms of infection  - Monitor lab/diagnostic results  - Monitor all insertion sites, i e  indwelling lines, tubes, and drains  - Monitor endotracheal if appropriate and nasal secretions for changes in amount and color  - [place] appropriate cooling/warming therapies per order  - Administer medications as ordered  - Instruct and encourage patient and family to use good hand hygiene technique  - Identify and instruct in appropriate isolation precautions for identified infection/condition  Outcome: Progressing  Goal: Absence of fever/infection during neutropenic period  Description: INTERVENTIONS:  - Monitor WBC    Outcome: Progressing     Problem: SAFETY ADULT  Goal: Patient will remain free of falls  Description: INTERVENTIONS:  - Educate patient/family on patient safety including physical limitations  - Instruct patient to call for assistance with activity   - Consult OT/PT to assist with strengthening/mobility   - Keep Call bell within reach  - Keep bed low and locked with side rails adjusted as appropriate  - Keep care items and personal belongings within reach  - Initiate and maintain comfort rounds  Outcome: Progressing  Goal: Maintain or return to baseline ADL function  Description: INTERVENTIONS:  -  Assess patient's ability to carry out ADLs; assess patient's baseline for ADL function and identify physical deficits which impact ability to perform ADLs (bathing, care of mouth/teeth, toileting, grooming, dressing, etc )  - Assess/evaluate cause of self-care deficits   - Assess range of motion  - Assess patient's mobility; develop plan if impaired  - Assess patient's need for assistive devices and provide as appropriate  - Encourage maximum independence but intervene and supervise when necessary  - Involve family in performance of ADLs  - Assess for home care needs following discharge   - Consider OT consult to assist with ADL evaluation and planning for discharge  - Provide patient education as appropriate  Outcome: Progressing  Goal: Maintains/Returns to pre admission functional level  Description: INTERVENTIONS:  - Perform BMAT or MOVE assessment daily    - Set and communicate daily mobility goal to care team and patient/family/caregiver  - Collaborate with rehabilitation services on mobility goals if consulted  - Record patient progress and toleration of activity level   Outcome: Progressing     Problem: Knowledge Deficit  Goal: Patient/family/caregiver demonstrates understanding of disease process, treatment plan, medications, and discharge instructions  Description: Complete learning assessment and assess knowledge base    Interventions:  - Provide teaching at level of understanding  - Provide teaching via preferred learning methods  Outcome: Progressing     Problem: DISCHARGE PLANNING  Goal: Discharge to home or other facility with appropriate resources  Description: INTERVENTIONS:  - Identify barriers to discharge w/patient and caregiver  - Arrange for needed discharge resources and transportation as appropriate  - Identify discharge learning needs (meds, wound care, etc )  - Arrange for interpretive services to assist at discharge as needed  - Refer to Case Management Department for coordinating discharge planning if the patient needs post-hospital services based on physician/advanced practitioner order or complex needs related to functional status, cognitive ability, or social support system  Outcome: Progressing

## 2023-05-05 NOTE — ANESTHESIA POSTPROCEDURE EVALUATION
Post-Op Assessment Note    CV Status:  Stable    Pain management: adequate     Mental Status:  Awake   Hydration Status:  Stable   PONV Controlled:  Controlled   Airway Patency:  Patent      Post Op Vitals Reviewed: Yes      Staff: Anesthesiologist     Post-op block assessment: no complications and catheter intact      No notable events documented      BP      Temp      Pulse    Resp      SpO2      /60   Pulse (!) 126   Temp 98 5 °F (36 9 °C) (Oral)   Resp 18   LMP 07/13/2022   Breastfeeding Unknown

## 2023-05-05 NOTE — ASSESSMENT & PLAN NOTE
Presented to L&D triage for brown discharge with rare contractions (every 15-20 min)  Dania Natanael discharge in setting of recent exam on speculum exam with no evidence of infection or ROM  Several spontaneous decelerations on FHT  Given scheduled eIOL in the morning, decision made to proceed with IOL now for decelerations now      · Admit for IOL for FHR decelerations starting with pitocin  · Routine admission labs (CBC, T&S, syphilis panel)  · Clear liquid diet  · Continuous fetal monitoring  · Analgesia at maternal request

## 2023-05-05 NOTE — OB LABOR/OXYTOCIN SAFETY PROGRESS
Oxytocin Safety Progress Check Note - Ailyn Villegas 44 y o  female MRN: [id number]    Unit/Bed#: L&D 325-01 Encounter: [id number]    Dose (maggie-units/min) Oxytocin: 4 maggie-units/min  Contraction Frequency (minutes): 4-5  Contraction Quality: Mild  Tachysystole: No   Cervical Dilation: 3        Cervical Effacement: 60  Fetal Station: -2  Baseline Rate: 145 bpm  Fetal Heart Rate: 156 BPM  FHR Category: Category II               Vital Signs:   Vitals:    05/05/23 1517   BP: 105/56   Pulse: 92   Temp:        Notes/comments:   Focal exam as above, she has a very posterior cervix  Amniotomy was performed for clear fluid  Patient tolerated well  She desires epidural in about an hour or so  We will start bolusing fluids          Ima Paget, DO 5/5/2023 3:59 PM

## 2023-05-05 NOTE — ANESTHESIA PREPROCEDURE EVALUATION
Procedure:  LABOR ANALGESIA    Relevant Problems   CARDIO (within normal limits)      GI/HEPATIC   (+) Gastroesophageal reflux disease without esophagitis      GYN   (+) 38 weeks gestation of pregnancy   (+) Multigravida of advanced maternal age in third trimester      PULMONARY (within normal limits)        Physical Exam    Airway    Mallampati score: II  TM Distance: >3 FB  Neck ROM: full     Dental       Cardiovascular  Cardiovascular exam normal    Pulmonary  Pulmonary exam normal     Other Findings        Anesthesia Plan  ASA Score- 2     Anesthesia Type- epidural with ASA Monitors  Additional Monitors:   Airway Plan:           Plan Factors-    Chart reviewed  Existing labs reviewed  Patient summary reviewed  Induction-     Postoperative Plan-     Informed Consent- Anesthetic plan and risks discussed with patient

## 2023-05-06 VITALS
HEART RATE: 88 BPM | DIASTOLIC BLOOD PRESSURE: 67 MMHG | TEMPERATURE: 97.6 F | SYSTOLIC BLOOD PRESSURE: 105 MMHG | RESPIRATION RATE: 16 BRPM | OXYGEN SATURATION: 99 %

## 2023-05-06 LAB
DME PARACHUTE DELIVERY DATE REQUESTED: NORMAL
DME PARACHUTE ITEM DESCRIPTION: NORMAL
DME PARACHUTE ORDER STATUS: NORMAL
DME PARACHUTE SUPPLIER NAME: NORMAL
DME PARACHUTE SUPPLIER PHONE: NORMAL
TREPONEMA PALLIDUM IGG+IGM AB [PRESENCE] IN SERUM OR PLASMA BY IMMUNOASSAY: NORMAL

## 2023-05-06 RX ORDER — IBUPROFEN 600 MG/1
600 TABLET ORAL EVERY 6 HOURS PRN
Qty: 30 TABLET | Refills: 0
Start: 2023-05-06

## 2023-05-06 RX ORDER — DOCUSATE SODIUM 100 MG/1
100 CAPSULE, LIQUID FILLED ORAL 2 TIMES DAILY PRN
Refills: 0
Start: 2023-05-06

## 2023-05-06 RX ORDER — ACETAMINOPHEN 325 MG/1
650 TABLET ORAL EVERY 6 HOURS PRN
Refills: 0
Start: 2023-05-06

## 2023-05-06 RX ADMIN — IBUPROFEN 600 MG: 600 TABLET, FILM COATED ORAL at 04:26

## 2023-05-06 RX ADMIN — ACETAMINOPHEN 325MG 650 MG: 325 TABLET ORAL at 09:03

## 2023-05-06 RX ADMIN — DOCUSATE SODIUM 100 MG: 100 CAPSULE, LIQUID FILLED ORAL at 09:03

## 2023-05-06 RX ADMIN — IBUPROFEN 600 MG: 600 TABLET, FILM COATED ORAL at 14:38

## 2023-05-06 RX ADMIN — DOCUSATE SODIUM 100 MG: 100 CAPSULE, LIQUID FILLED ORAL at 18:47

## 2023-05-06 RX ADMIN — IBUPROFEN 600 MG: 600 TABLET, FILM COATED ORAL at 20:39

## 2023-05-06 NOTE — PLAN OF CARE
Problem: PAIN - ADULT  Goal: Verbalizes/displays adequate comfort level or baseline comfort level  Description: Interventions:  - Encourage patient to monitor pain and request assistance  - Assess pain using appropriate pain scale  - Administer analgesics based on type and severity of pain and evaluate response  - Implement non-pharmacological measures as appropriate and evaluate response  - Consider cultural and social influences on pain and pain management  - Notify physician/advanced practitioner if interventions unsuccessful or patient reports new pain  Outcome: Progressing     Problem: INFECTION - ADULT  Goal: Absence or prevention of progression during hospitalization  Description: INTERVENTIONS:  - Assess and monitor for signs and symptoms of infection  - Monitor lab/diagnostic results  - Monitor all insertion sites, i e  indwelling lines, tubes, and drains  - Monitor endotracheal if appropriate and nasal secretions for changes in amount and color  - [place] appropriate cooling/warming therapies per order  - Administer medications as ordered  - Instruct and encourage patient and family to use good hand hygiene technique  - Identify and instruct in appropriate isolation precautions for identified infection/condition  Outcome: Progressing  Goal: Absence of fever/infection during neutropenic period  Description: INTERVENTIONS:  - Monitor WBC    Outcome: Progressing     Problem: SAFETY ADULT  Goal: Patient will remain free of falls  Description: INTERVENTIONS:  - Educate patient/family on patient safety including physical limitations  - Instruct patient to call for assistance with activity   - Consult OT/PT to assist with strengthening/mobility   - Keep Call bell within reach  - Keep bed low and locked with side rails adjusted as appropriate  - Keep care items and personal belongings within reach  - Initiate and maintain comfort rounds  - Make Fall Risk Sign visible to staff  - Offer Toileting every  Hours, in advance of need  - Initiate/Maintain alarm  - Obtain necessary fall risk management equipment:   - Apply yellow socks and bracelet for high fall risk patients  - Consider moving patient to room near nurses station  Outcome: Progressing  Goal: Maintain or return to baseline ADL function  Description: INTERVENTIONS:  -  Assess patient's ability to carry out ADLs; assess patient's baseline for ADL function and identify physical deficits which impact ability to perform ADLs (bathing, care of mouth/teeth, toileting, grooming, dressing, etc )  - Assess/evaluate cause of self-care deficits   - Assess range of motion  - Assess patient's mobility; develop plan if impaired  - Assess patient's need for assistive devices and provide as appropriate  - Encourage maximum independence but intervene and supervise when necessary  - Involve family in performance of ADLs  - Assess for home care needs following discharge   - Consider OT consult to assist with ADL evaluation and planning for discharge  - Provide patient education as appropriate  Outcome: Progressing  Goal: Maintains/Returns to pre admission functional level  Description: INTERVENTIONS:  - Perform BMAT or MOVE assessment daily    - Set and communicate daily mobility goal to care team and patient/family/caregiver  - Collaborate with rehabilitation services on mobility goals if consulted  - Perform Range of Motion  times a day  - Reposition patient every  hours    - Dangle patient  times a day  - Stand patient  times a day  - Ambulate patient  times a day  - Out of bed to chair  times a day   - Out of bed for meals times a day  - Out of bed for toileting  - Record patient progress and toleration of activity level   Outcome: Progressing     Problem: Knowledge Deficit  Goal: Patient/family/caregiver demonstrates understanding of disease process, treatment plan, medications, and discharge instructions  Description: Complete learning assessment and assess knowledge base   Interventions:  - Provide teaching at level of understanding  - Provide teaching via preferred learning methods  Outcome: Progressing     Problem: DISCHARGE PLANNING  Goal: Discharge to home or other facility with appropriate resources  Description: INTERVENTIONS:  - Identify barriers to discharge w/patient and caregiver  - Arrange for needed discharge resources and transportation as appropriate  - Identify discharge learning needs (meds, wound care, etc )  - Arrange for interpretive services to assist at discharge as needed  - Refer to Case Management Department for coordinating discharge planning if the patient needs post-hospital services based on physician/advanced practitioner order or complex needs related to functional status, cognitive ability, or social support system  Outcome: Progressing

## 2023-05-06 NOTE — CASE MANAGEMENT
Case Management Progress Note    Patient name Manoj Limon  Location L&D 308/L&D 722-56 MRN [id number]  : 1985 Date 2023       LOS (days): 1  Geometric Mean LOS (GMLOS) (days):   Days to GMLOS:        OBJECTIVE:        Current admission status: Inpatient  Preferred Pharmacy:   [address] [place] [address] 90, [address] [facility] [address] [address]  Phone: [phone] Fax: [phone]    Primary Care Provider: Rand Mcdowell PA-C    Primary Insurance: [facility]  Secondary Insurance:     PROGRESS NOTE:    CM received consult for breast pump  MOB requesting Zomee pump  CM spoke with MOB via phone  CM explained [employer] is not open on weekends to process breast pumps, therefore CM can order breast pump for delivery to home and [facility] can ship after processing order on Monday  MOB in agreement with this plan  CM placed order via [employer]

## 2023-05-06 NOTE — PROGRESS NOTES
Progress Note - OB/GYN   Greg Barraza 44 y o  female MRN: [id number]  Unit/Bed#: L&D 308-01 Encounter: [id number]    Assessment:  Post partum Day #1 s/p , stable    Plan:    *  (spontaneous vaginal delivery)  Assessment & Plan  Continue routine post partum care  Encourage ambulation  Encourage breastfeeding    Fetal cardiac anomaly complicating pregnancy, antepartum  Assessment & Plan  Pericardial effusion noted in March with normal US in April  Sickle cell trait in mother affecting pregnancy [facility])  Assessment & Plan  FOB negative  Subjective/Objective   Chief Complaint:     Post delivery  Patient is doing well  Lochia WNL  Pain well controlled  Subjective:     Pain: yes, cramping, improved with meds  Tolerating PO: yes  Voiding: yes  Flatus: yes  BM: no  Ambulating: yes  Chest pain: no  Shortness of breath: no  Leg pain: no  Lochia: minimal    Objective:     Vitals: /73 (BP Location: Right arm)   Pulse 105   Temp 98 2 °F (36 8 °C) (Oral)   Resp 18   LMP 2022   SpO2 97%   Breastfeeding Yes       Intake/Output Summary (Last 24 hours) at 2023 0238  Last data filed at 2023 0101  Gross per 24 hour   Intake 857 63 ml   Output 1225 ml   Net -367 37 ml       Lab Results   Component Value Date    WBC 11 09 (H) 2023    HGB 12 7 2023    HCT 39 6 2023    MCV 78 (L) 2023     2023       Physical Exam:     Gen: AAOx3, NAD  CV: RRR  Lungs: CTA b/l  Abd: Soft, non-tender, non-distended, no rebound or guarding  Uterine fundus firm and non-tender, 2 cm below the umbilicus     Ext: Non tender    Micah Johnson MD  2023  2:38 AM

## 2023-05-06 NOTE — LACTATION NOTE
This note was copied from a baby's chart  CONSULT - LACTATION  Baby Boy Stephanie Mickey) Shante Acosta 1 days male MRN: [id number]    [address] NURSERY Room / Bed: L&D 308(N)/L&D 308(N) Encounter: [id number]    Maternal Information     MOTHER:  Daryle Schmidt  Maternal Age: 44 y o    OB History: # 1 - Date: [birth year], Sex: Female, Weight: 3430 g (7 lb 9 oz), GA: 40w0d, Delivery: Vaginal, Spontaneous, Apgar1: None, Apgar5: None, Living: Living, Birth Comments: None    # 2 - Date: 17, Sex: Male, Weight: 3884 g (8 lb 9 oz), GA: 40w0d, Delivery: Vaginal, Spontaneous, Apgar1: None, Apgar5: None, Living: Living, Birth Comments: None    # 3 - Date: 10/2021, Sex: None, Weight: None, GA: 7w0d, Delivery: None, Apgar1: None, Apgar5: None, Living: None, Birth Comments: None    # 4 - Date: 2021, Sex: None, Weight: None, GA: 4w0d, Delivery: None, Apgar1: None, Apgar5: None, Living: None, Birth Comments: None    # 5 - Date: 23, Sex: Male, Weight: 2850 g (6 lb 4 5 oz), GA: 38w6d, Delivery: Vaginal, Spontaneous, Apgar1: 9, Apgar5: 9, Living: Living, Birth Comments: None   Previouse breast reduction surgery?  No    Lactation history:   Has patient previously breast fed: No (attempted)   How long had patient previously breast fed:     Previous breast feeding complications:       Past Surgical History:   Procedure Laterality Date   • CHOLECYSTECTOMY          Birth information:  YOB: 2024   Time of birth: 6:26 PM   Sex: male   Delivery type: Vaginal, Spontaneous   Birth Weight: 2850 g (6 lb 4 5 oz)   Percent of Weight Change: 0%     Gestational Age: 38w7d   [unfilled]    Assessment     Breast and nipple assessment: Denies need for assistance    [place] Assessment: sleepy    Feeding assessment: feeding well as per Mom; encouraged calling for latch assistance    LATCH:  Latch: Grasps breast, tongue down, lips flanged, rhythmic sucking   Audible Swallowing: A few with stimulation   Type of Nipple: Everted (After stimulation)   Comfort (Breast/Nipple): Soft/non-tender   Hold (Positioning): No assist from staff, mother able to position/hold infant   LATCH Score: 9          Feeding recommendations:  breast feed on demand       Met with mother  Provided  with Ready, Set, Baby booklet which contained information on:  Hand expression with access to QR codes to review hand expression  Positioning and latch reviewed as well as showing images of other feeding positions  Discussed the properties of a good latch in any position  Feeding on cue and what that means for recognizing infant's hunger, s/s that baby is getting enough milk and some s/s that breastfeeding dyad may need further help Dad at bedside  Skin to Skin contact an benefits to mom and baby  Avoidance of pacifiers for the first month discussed  Discussed risks for early supplementation: over feeding, longer digestion times, engorgement for mom, lower milk supply for mom, and nipple confusion  Benefits of breast feeding for infant's intestinal tract, less engorgement for mom, protection from multiple disease processes as infant develops, avoidance of over feeding for infant, less nipple confusion, and increased health benefits for mom  Gave information on common concerns, what to expect the first few weeks after delivery, preparing for other caregivers, and how partners can help  Resources for support also provided  Also reviewed  discharge breastfeeding booklet including the feeding log  Emphasized 8 or more (12) feedings in a 24 hour period, what to expect for the number of diapers per day of life and the progression of properties of the  stooling pattern  Reviewed breastfeeding and your lifestyle, storage and preparation of breast milk, how to keep you breast pump clean, the employed breastfeeding mother and paced bottle feeding handouts       Booklet included Breastfeeding Resources for after discharge including access to the number for the [address]  [facility]  to call for assistance, questions and concerns  Extension number for inpatient lactation support provided        Brain Jacob RN 5/6/2023 4:31 PM

## 2023-05-06 NOTE — PLAN OF CARE
Problem: PAIN - ADULT  Goal: Verbalizes/displays adequate comfort level or baseline comfort level  Description: Interventions:  - Encourage patient to monitor pain and request assistance  - Assess pain using appropriate pain scale  - Administer analgesics based on type and severity of pain and evaluate response  - Implement non-pharmacological measures as appropriate and evaluate response  - Consider cultural and social influences on pain and pain management  - Notify physician/advanced practitioner if interventions unsuccessful or patient reports new pain  Outcome: Progressing     Problem: INFECTION - ADULT  Goal: Absence or prevention of progression during hospitalization  Description: INTERVENTIONS:  - Assess and monitor for signs and symptoms of infection  - Monitor lab/diagnostic results  - Monitor all insertion sites, i e  indwelling lines, tubes, and drains  - Monitor endotracheal if appropriate and nasal secretions for changes in amount and color  - [place] appropriate cooling/warming therapies per order  - Administer medications as ordered  - Instruct and encourage patient and family to use good hand hygiene technique  - Identify and instruct in appropriate isolation precautions for identified infection/condition  Outcome: Progressing  Goal: Absence of fever/infection during neutropenic period  Description: INTERVENTIONS:  - Monitor WBC    Outcome: Progressing     Problem: SAFETY ADULT  Goal: Patient will remain free of falls  Description: INTERVENTIONS:  - Educate patient/family on patient safety including physical limitations  - Instruct patient to call for assistance with activity   - Consult OT/PT to assist with strengthening/mobility   - Keep Call bell within reach  - Keep bed low and locked with side rails adjusted as appropriate  - Keep care items and personal belongings within reach  - Initiate and maintain comfort rounds  - Make Fall Risk Sign visible to staff  - Apply yellow socks and bracelet for high fall risk patients  - Consider moving patient to room near nurses station  Outcome: Progressing  Goal: Maintain or return to baseline ADL function  Description: INTERVENTIONS:  -  Assess patient's ability to carry out ADLs; assess patient's baseline for ADL function and identify physical deficits which impact ability to perform ADLs (bathing, care of mouth/teeth, toileting, grooming, dressing, etc )  - Assess/evaluate cause of self-care deficits   - Assess range of motion  - Assess patient's mobility; develop plan if impaired  - Assess patient's need for assistive devices and provide as appropriate  - Encourage maximum independence but intervene and supervise when necessary  - Involve family in performance of ADLs  - Assess for home care needs following discharge   - Consider OT consult to assist with ADL evaluation and planning for discharge  - Provide patient education as appropriate  Outcome: Progressing  Goal: Maintains/Returns to pre admission functional level  Description: INTERVENTIONS:  - Perform BMAT or MOVE assessment daily    - Set and communicate daily mobility goal to care team and patient/family/caregiver  - Collaborate with rehabilitation services on mobility goals if consulted  - Out of bed for toileting  - Record patient progress and toleration of activity level   Outcome: Progressing     Problem: Knowledge Deficit  Goal: Patient/family/caregiver demonstrates understanding of disease process, treatment plan, medications, and discharge instructions  Description: Complete learning assessment and assess knowledge base    Interventions:  - Provide teaching at level of understanding  - Provide teaching via preferred learning methods  Outcome: Progressing     Problem: DISCHARGE PLANNING  Goal: Discharge to home or other facility with appropriate resources  Description: INTERVENTIONS:  - Identify barriers to discharge w/patient and caregiver  - Arrange for needed discharge resources and transportation as appropriate  - Identify discharge learning needs (meds, wound care, etc )  - Arrange for interpretive services to assist at discharge as needed  - Refer to Case Management Department for coordinating discharge planning if the patient needs post-hospital services based on physician/advanced practitioner order or complex needs related to functional status, cognitive ability, or social support system  Outcome: Progressing

## 2023-05-08 LAB
DME PARACHUTE DELIVERY DATE ACTUAL: NORMAL
DME PARACHUTE DELIVERY DATE REQUESTED: NORMAL
DME PARACHUTE ITEM DESCRIPTION: NORMAL
DME PARACHUTE ORDER STATUS: NORMAL
DME PARACHUTE SUPPLIER NAME: NORMAL
DME PARACHUTE SUPPLIER PHONE: NORMAL

## 2023-05-08 NOTE — UTILIZATION REVIEW
"NOTIFICATION OF INPATIENT ADMISSION   MATERNITY/DELIVERY AUTHORIZATION REQUEST   SERVICING FACILITY:   [address] [place] [address] - L&D, [place], NICU  [address] [place] [address] [facility]  Tax ID: [id number]  NPI: [id number] ATTENDING PROVIDER:  Attending Name and NPI#: Josemakatherin Cortes [[id number]]  Address: [address] [place] [address] [facility]  Phone: [phone]     ADMISSION INFORMATION:  Place of Service[facility]patient [facility]  Place of Service Code: 21  Inpatient Admission Date/Time: 23 11:45 AM  Discharge Date/Time: 2023  9:06 PM  Admitting Diagnosis Code/Description:  Vaginal discharge during pregnancy in third trimester [O26 893, N89 8]     Mother: Darnell Day 1985 Estimated Date of Delivery: 23  Delivering clinician: Latanya Lennert Toussaint-Foster    OB History        5    Para   3    Term   3            AB   2    Living   3       SAB   2    IAB        Ectopic        Multiple   0    Live Births   3                Name & MRN:   Information for the patient's :  Christian Ortiz [[id number]]     [place] Delivery Information:  Sex: male  Delivered 2023 6:26 PM by Vaginal, Spontaneous; Gestational Age: 38w7d     Measurements:  Weight: 6 lb 4 5 oz (2850 g); Height: 19 75\"    APGAR 1 minute 5 minutes 10 minutes   Totals: 9 9       Birth Information: 44 y o  female MRN: [id number] Unit/Bed#: L&D 308-01   Birthweight: No birth weight on file  Gestational Age: <None> Delivery Type:    APGARS Totals:        UTILIZATION REVIEW CONTACT:  Nakul Khan Utilization   Network Utilization Review Department  Phone: [phone]  Fax [phone]  Email: [email or web address]  Contact for approvals/pending authorizations, clinical reviews, and discharge       PHYSICIAN ADVISORY SERVICES:  Medical Necessity Denial & Mpno-jk-Rcnd Review  Phone: [phone]  " Fax: [phone]  Email: [email or web address]

## 2023-05-10 ENCOUNTER — APPOINTMENT (EMERGENCY)
Dept: ULTRASOUND IMAGING | Facility: HOSPITAL | Age: 38
End: 2023-05-10

## 2023-05-10 ENCOUNTER — HOSPITAL ENCOUNTER (EMERGENCY)
Facility: HOSPITAL | Age: 38
Discharge: HOME | End: 2023-05-10
Attending: EMERGENCY MEDICINE

## 2023-05-10 VITALS
DIASTOLIC BLOOD PRESSURE: 92 MMHG | HEART RATE: 79 BPM | SYSTOLIC BLOOD PRESSURE: 125 MMHG | TEMPERATURE: 97.4 F | OXYGEN SATURATION: 98 % | RESPIRATION RATE: 17 BRPM

## 2023-05-10 LAB
ALBUMIN SERPL BCP-MCNC: 3.4 G/DL (ref 3.5–5)
ALP SERPL-CCNC: 106 U/L (ref 34–104)
ALT SERPL W P-5'-P-CCNC: 18 U/L (ref 7–52)
ANION GAP SERPL CALCULATED.3IONS-SCNC: 10 MMOL/L (ref 4–13)
AST SERPL W P-5'-P-CCNC: 19 U/L (ref 13–39)
BACTERIA UR QL AUTO: ABNORMAL /HPF
BASOPHILS # BLD AUTO: 0.03 THOUSANDS/ÂΜL (ref 0–0.1)
BASOPHILS NFR BLD AUTO: 0 % (ref 0–1)
BILIRUB SERPL-MCNC: 0.28 MG/DL (ref 0.2–1)
BILIRUB UR QL STRIP: NEGATIVE
BUN SERPL-MCNC: 12 MG/DL (ref 5–25)
CALCIUM ALBUM COR SERPL-MCNC: 10.1 MG/DL (ref 8.3–10.1)
CALCIUM SERPL-MCNC: 9.6 MG/DL (ref 8.4–10.2)
CHLORIDE SERPL-SCNC: 106 MMOL/L (ref 96–108)
CLARITY UR: CLEAR
CO2 SERPL-SCNC: 25 MMOL/L (ref 21–32)
COLOR UR: YELLOW
CREAT SERPL-MCNC: 0.72 MG/DL (ref 0.6–1.3)
EOSINOPHIL # BLD AUTO: 0.24 THOUSAND/ÂΜL (ref 0–0.61)
EOSINOPHIL NFR BLD AUTO: 3 % (ref 0–6)
ERYTHROCYTE [DISTWIDTH] IN BLOOD BY AUTOMATED COUNT: 18.4 % (ref 11.6–15.1)
GFR SERPL CREATININE-BSD FRML MDRD: 106 ML/MIN/1.73SQ M
GLUCOSE SERPL-MCNC: 104 MG/DL (ref 65–140)
GLUCOSE UR STRIP-MCNC: NEGATIVE MG/DL
HCT VFR BLD AUTO: 39.2 % (ref 34.8–46.1)
HGB BLD-MCNC: 13 G/DL (ref 11.5–15.4)
HGB UR QL STRIP.AUTO: ABNORMAL
IMM GRANULOCYTES # BLD AUTO: 0.05 THOUSAND/UL (ref 0–0.2)
IMM GRANULOCYTES NFR BLD AUTO: 1 % (ref 0–2)
KETONES UR STRIP-MCNC: NEGATIVE MG/DL
LEUKOCYTE ESTERASE UR QL STRIP: ABNORMAL
LYMPHOCYTES # BLD AUTO: 2.05 THOUSANDS/ÂΜL (ref 0.6–4.47)
LYMPHOCYTES NFR BLD AUTO: 22 % (ref 14–44)
MCH RBC QN AUTO: 25.8 PG (ref 26.8–34.3)
MCHC RBC AUTO-ENTMCNC: 33.2 G/DL (ref 31.4–37.4)
MCV RBC AUTO: 78 FL (ref 82–98)
MONOCYTES # BLD AUTO: 0.52 THOUSAND/ÂΜL (ref 0.17–1.22)
MONOCYTES NFR BLD AUTO: 6 % (ref 4–12)
NEUTROPHILS # BLD AUTO: 6.61 THOUSANDS/ÂΜL (ref 1.85–7.62)
NEUTS SEG NFR BLD AUTO: 68 % (ref 43–75)
NITRITE UR QL STRIP: NEGATIVE
NON-SQ EPI CELLS URNS QL MICRO: ABNORMAL /HPF
NRBC BLD AUTO-RTO: 0 /100 WBCS
PH UR STRIP.AUTO: 6.5 [PH]
PLATELET # BLD AUTO: 346 THOUSANDS/UL (ref 149–390)
PMV BLD AUTO: 10.2 FL (ref 8.9–12.7)
POTASSIUM SERPL-SCNC: 3.6 MMOL/L (ref 3.5–5.3)
PROT SERPL-MCNC: 6.4 G/DL (ref 6.4–8.4)
PROT UR STRIP-MCNC: NEGATIVE MG/DL
RBC # BLD AUTO: 5.04 MILLION/UL (ref 3.81–5.12)
RBC #/AREA URNS AUTO: ABNORMAL /HPF
SODIUM SERPL-SCNC: 141 MMOL/L (ref 135–147)
SP GR UR STRIP.AUTO: 1.01 (ref 1–1.03)
UROBILINOGEN UR QL STRIP.AUTO: 0.2 E.U./DL
WBC # BLD AUTO: 9.5 THOUSAND/UL (ref 4.31–10.16)
WBC #/AREA URNS AUTO: ABNORMAL /HPF

## 2023-05-10 RX ORDER — NAPROXEN 250 MG/1
250 TABLET ORAL ONCE
Status: COMPLETED | OUTPATIENT
Start: 2023-05-10 | End: 2023-05-10

## 2023-05-10 RX ORDER — ACETAMINOPHEN 325 MG/1
650 TABLET ORAL ONCE
Status: COMPLETED | OUTPATIENT
Start: 2023-05-10 | End: 2023-05-10

## 2023-05-10 RX ADMIN — NAPROXEN 250 MG: 250 TABLET ORAL at 19:04

## 2023-05-10 RX ADMIN — ACETAMINOPHEN 325MG 650 MG: 325 TABLET ORAL at 17:12

## 2023-05-10 NOTE — ED PROVIDER NOTES
"History  Chief Complaint   Patient presents with   • Abdominal Pain     Patient states had a baby Friday, delivered vaginally and denies needing stitches for a tear  States since Monday has been having sharp groin pain and passing \"very large clots\", denies heavy bleeding  Also states since Monday has been experiencing chills     The patient is a 50-year-old  female who underwent spontaneous vaginal delivery on 2382 without complication who presents to the emergency department today with groin pain and vaginal bleeding  The patient states that she has been experiencing bilateral groin pain and passing large clots of blood for the past couple of days  The pain is worsened and she now rates it a 9 out of 10  She states that she has been taking Motrin and Tylenol without relief  The patient reports that she only experiences large clots when she urinates and has been wearing a pad but has not soaked through her pads  The patient also reported hematuria and chills but denies lightheadedness, chest pain, shortness of breath, fever, nausea, vomiting, numbness, tingling or rash  There was no complications with her pregnancy and her infant is doing well at this time  Prior to Admission Medications   Prescriptions Last Dose Informant Patient Reported? Taking?    Prenatal w/o A Vit-Fe Fum-FA (PRENATA PO)   Yes No   Sig: Take by mouth   acetaminophen (TYLENOL) 325 mg tablet   No No   Sig: Take 2 tablets (650 mg total) by mouth every 6 (six) hours as needed for mild pain or moderate pain   docusate sodium (COLACE) 100 mg capsule   No No   Sig: Take 1 capsule (100 mg total) by mouth 2 (two) times a day as needed for constipation   ibuprofen (MOTRIN) 600 mg tablet   No No   Sig: Take 1 tablet (600 mg total) by mouth every 6 (six) hours as needed for moderate pain or mild pain   omeprazole (PriLOSEC) 20 mg delayed release capsule   Yes No   Sig: Take 20 mg by mouth daily as needed PRN    " Facility-Administered Medications: None       Past Medical History:   Diagnosis Date   • Polycystic ovary syndrome    • Sickle cell trait ([facility] [address] )        Past Surgical History:   Procedure Laterality Date   • CHOLECYSTECTOMY         Family History   Problem Relation Age of Onset   • Diabetes Mother    • Hypertension Mother    • Kidney disease Mother    • Diabetes Father    • Hypertension Father    • No Known Problems Daughter    • No Known Problems Son    • Diabetes Maternal Grandmother    • Alzheimer's disease Maternal Grandfather    • Liver cancer Paternal Grandmother    • Breast cancer Neg Hx    • Colon cancer Neg Hx    • Ovarian cancer Neg Hx      I have reviewed and agree with the history as documented  E-Cigarette/Vaping   • E-Cigarette Use Never User      E-Cigarette/Vaping Substances   • Nicotine No    • THC No    • CBD No    • Flavoring No    • Other No    • Unknown No      Social History     Tobacco Use   • Smoking status: Never   • Smokeless tobacco: Never   Vaping Use   • Vaping Use: Never used   Substance Use Topics   • Alcohol use: Not Currently   • Drug use: Never        Review of Systems   Constitutional: Positive for chills  Negative for fatigue and fever  HENT: Negative for congestion, ear pain and sore throat  Eyes: Negative for pain and visual disturbance  Respiratory: Negative for cough, shortness of breath, wheezing and stridor  Cardiovascular: Negative for chest pain, palpitations and leg swelling  Gastrointestinal: Positive for abdominal pain  Negative for abdominal distention, diarrhea, nausea and vomiting  Endocrine: Negative  Genitourinary: Positive for hematuria and vaginal bleeding  Negative for dysuria  Musculoskeletal: Negative for arthralgias, back pain, neck pain and neck stiffness  Skin: Negative for color change and rash  Allergic/Immunologic: Negative  Neurological: Negative for dizziness, seizures, syncope, weakness, light-headedness and headaches  Hematological: Negative  Psychiatric/Behavioral: Negative  All other systems reviewed and are negative  Physical Exam  ED Triage Vitals [05/10/23 1601]   Temperature Pulse Respirations Blood Pressure SpO2   (!) 97 4 °F (36 3 °C) 98 18 149/72 98 %      Temp Source Heart Rate Source Patient Position - Orthostatic VS BP Location FiO2 (%)   Temporal Monitor Lying Left arm --      Pain Score       7             Orthostatic Vital Signs  Vitals:    05/10/23 1601 05/10/23 1630 05/10/23 1930 05/10/23 2000   BP: 149/72 125/74 130/86 125/92   Pulse: 98 99 78 79   Patient Position - Orthostatic VS: Lying Lying Lying Lying       Physical Exam  Vitals and nursing note reviewed  Constitutional:       General: She is not in acute distress  Appearance: She is well-developed  She is obese  She is ill-appearing  HENT:      Head: Normocephalic and atraumatic  Mouth/Throat:      Mouth: Mucous membranes are moist       Pharynx: Oropharynx is clear  Eyes:      Extraocular Movements: Extraocular movements intact  Conjunctiva/sclera: Conjunctivae normal       Pupils: Pupils are equal, round, and reactive to light  Cardiovascular:      Rate and Rhythm: Normal rate and regular rhythm  Heart sounds: Normal heart sounds  No murmur heard  No friction rub  Pulmonary:      Effort: Pulmonary effort is normal  No respiratory distress  Breath sounds: Normal breath sounds  No wheezing, rhonchi or rales  Abdominal:      General: Abdomen is flat  Bowel sounds are normal  There is no distension  Palpations: Abdomen is soft  There is no hepatomegaly, splenomegaly or mass  Tenderness: There is abdominal tenderness in the suprapubic area  There is no guarding or rebound  Negative signs include Sawant's sign, Rovsing's sign and McBurney's sign  Hernia: No hernia is present  Musculoskeletal:         General: No swelling  Cervical back: Neck supple     Skin:     General: Skin is warm and dry       Capillary Refill: Capillary refill takes less than 2 seconds  Coloration: Skin is not pale  Findings: No erythema or rash  Neurological:      General: No focal deficit present  Mental Status: She is alert and oriented to person, place, and time  Cranial Nerves: No cranial nerve deficit  Motor: No weakness     Psychiatric:         Mood and Affect: Mood normal          ED Medications  Medications   acetaminophen (TYLENOL) tablet 650 mg (650 mg Oral Given 5/10/23 1712)   naproxen (NAPROSYN) tablet 250 mg (250 mg Oral Given 5/10/23 1904)       Diagnostic Studies  Results Reviewed     Procedure Component Value Units Date/Time    Urine Microscopic [[id number]]  (Abnormal) Collected: 05/10/23 1720    Lab Status: Final result Specimen: Urine, Other Updated: 05/10/23 1756     RBC, UA 20->100 /hpf      WBC, UA 1-2 /hpf      Epithelial Cells Occasional /hpf      Bacteria, UA Occasional /hpf     UA w Reflex to Microscopic w Reflex to Culture [[id number]]  (Abnormal) Collected: 05/10/23 1720    Lab Status: Final result Specimen: Urine, Other Updated: 05/10/23 1744     Color, UA Yellow     Clarity, UA Clear     Specific Gravity, UA 1 015     pH, UA 6 5     Leukocytes, UA Trace     Nitrite, UA Negative     Protein, UA Negative mg/dl      Glucose, UA Negative mg/dl      Ketones, UA Negative mg/dl      Urobilinogen, UA 0 2 E U /dl      Bilirubin, UA Negative     Occult Blood, UA Large    Comprehensive metabolic panel [[id number]]  (Abnormal) Collected: 05/10/23 1719    Lab Status: Final result Specimen: Blood from Arm, Left Updated: 05/10/23 1744     Sodium 141 mmol/L      Potassium 3 6 mmol/L      Chloride 106 mmol/L      CO2 25 mmol/L      ANION GAP 10 mmol/L      BUN 12 mg/dL      Creatinine 0 72 mg/dL      Glucose 104 mg/dL      Calcium 9 6 mg/dL      Corrected Calcium 10 1 mg/dL      AST 19 U/L      ALT 18 U/L      Alkaline Phosphatase 106 U/L      Total Protein 6 4 g/dL      Albumin 3 4 g/dL Total Bilirubin 0 28 mg/dL      eGFR 106 ml/min/1 73sq m     Narrative:      [place] guidelines for Chronic Kidney Disease (CKD):   •  Stage 1 with normal or high GFR (GFR > 90 mL/min/1 73 square meters)  •  Stage 2 Mild CKD (GFR = 60-89 mL/min/1 73 square meters)  •  Stage 3A Moderate CKD (GFR = 45-59 mL/min/1 73 square meters)  •  Stage 3B Moderate CKD (GFR = 30-44 mL/min/1 73 square meters)  •  Stage 4 Severe CKD (GFR = 15-29 mL/min/1 73 square meters)  •  Stage 5 End Stage CKD (GFR <15 mL/min/1 73 square meters)  Note: GFR calculation is accurate only with a steady state creatinine    CBC and differential [[id number]]  (Abnormal) Collected: 05/10/23 1719    Lab Status: Final result Specimen: Blood from Arm, Left Updated: 05/10/23 1726     WBC 9 50 Thousand/uL      RBC 5 04 Million/uL      Hemoglobin 13 0 g/dL      Hematocrit 39 2 %      MCV 78 fL      MCH 25 8 pg      MCHC 33 2 g/dL      RDW 18 4 %      MPV 10 2 fL      Platelets 439 Thousands/uL      nRBC 0 /100 WBCs      Neutrophils Relative 68 %      Immat GRANS % 1 %      Lymphocytes Relative 22 %      Monocytes Relative 6 %      Eosinophils Relative 3 %      Basophils Relative 0 %      Neutrophils Absolute 6 61 Thousands/µL      Immature Grans Absolute 0 05 Thousand/uL      Lymphocytes Absolute 2 05 Thousands/µL      Monocytes Absolute 0 52 Thousand/µL      Eosinophils Absolute 0 24 Thousand/µL      Basophils Absolute 0 03 Thousands/µL                  US pelvis transabdominal only   Final Result by Lisa Blackburn MD (05/10 1949)   Transabdominal imaging only  Patient declined transvaginal imaging  1  Heterogeneously thickened endometrial complex with internal vascularity suspicious for retained products of conception  2   There is increased vascularity at the anterior myometrium abutting the endometrial complex which appears ill-defined   Question related to inflammation, extension of retained products contents considered less likely  The study was marked in [facility] for immediate notification  Workstation performed: [id number]               Procedures  Procedures      ED Course  ED Course as of 05/10/23 2153   Wed May 10, 2023   1754 UA w Reflex to Microscopic w Reflex to Culture(!)  Trace leukocytes with large amounts of blood  1755 CBC and differential(!)  WBC and Hgb WNL   1755 Patient reports improved pain at this time  Awaiting US  2017 US pelvis transabdominal only  IMPRESSION:  Transabdominal imaging only  Patient declined transvaginal imaging  1  Heterogeneously thickened endometrial complex with internal vascularity suspicious for retained products of conception  2   There is increased vascularity at the anterior myometrium abutting the endometrial complex which appears ill-defined  Question related to inflammation, extension of retained products contents considered less likely  2111 Myself and my attending conducted a pelvic exam with speculum with Ariane PIERSON  The ER tech as a chaperone  There was no obvious retained product that could be visualized  We will consult OBGYN at this time  2149 I spoke with Dr Diann Hooper who recommended evaluating the patient in the outpatient clinic tomorrow and possibly scheduling a D&C  Return precautions will be discussed  Further instructions per discharge orders  SBIRT 20yo+    Flowsheet Row Most Recent Value   Initial Alcohol Screen: US AUDIT-C     1  How often do you have a drink containing alcohol? 0 Filed at: 05/10/2023 1603   3b  FEMALE Any Age, or MALE 65+: How often do you have 4 or more drinks on one occassion? 0 Filed at: 05/10/2023 1603   Audit-C Score 0 Filed at: 05/10/2023 1603   EDS: How many times in the past year have you    Used an illegal drug or used a prescription medication for non-medical reasons?  Never Filed at: 05/10/2023 1603                Medical Decision Making  The patient is a 68-year-old  female who underwent spontaneous vaginal delivery on 1572 without complication who presents to the emergency department today with groin pain and vaginal bleeding  Upon initial presentation the patient was alert and oriented x4 and did not appear to be in acute distress  There was mild tenderness to palpation of her suprapubic region but the remainder of her physical exam was grossly unremarkable  The differential includes but is not limited to retained products, vaginal trauma secondary to delivery or UTI  I ordered a CBC, CMP, UA and a transvaginal ultrasound  Results pending  Amount and/or Complexity of Data Reviewed  Labs: ordered  Radiology: ordered  Risk  OTC drugs  Disposition  Final diagnoses:   Retained products of conception with postpartum problem without hemorrhage     Time reflects when diagnosis was documented in both MDM as applicable and the Disposition within this note     Time User Action Codes Description Comment    5/10/2023  9:51 PM Melvin Chavarria Add [O73 1] Retained products of conception with postpartum problem without hemorrhage       ED Disposition     ED Disposition   Discharge    Condition   Stable    Date/Time   Wed May 10, 2023  9:50 PM     E [facility] discharge to home/self care  Follow-up Information     Follow up With Specialties Details Why Contact Info Additional [id number] Obstetrics and Gynecology Go to  Can appointment for retained products tomorrow   [place]  [id number]  [address], [id number] 036 [facility] Emergency Medicine  As needed [place]  [id number] 55 [facility], [place] [address], [place], [place], [address]          Patient's Medications   Discharge Prescriptions    No medications on file     No discharge procedures on file  PDMP Review       Value Time User    PDMP Reviewed  Yes 10/13/2021  3:14 PM Elise Torres PA-C           ED Provider  Attending physically available and evaluated [facility]  I managed the patient along with the ED Attending      Electronically Signed by         Mark Elkins DO  05/10/23 6267 Libtayo Counseling- I discussed with the patient the risks of Libtayo including but not limited to nausea, vomiting, diarrhea, and bone or muscle pain.  The patient verbalized understanding of the proper use and possible adverse effects of Libtayo.  All of the patient's questions and concerns were addressed.

## 2023-05-11 ENCOUNTER — POSTPARTUM VISIT (OUTPATIENT)
Dept: OBGYN CLINIC | Facility: CLINIC | Age: 38
End: 2023-05-11

## 2023-05-11 VITALS
HEIGHT: 66 IN | WEIGHT: 222 LBS | BODY MASS INDEX: 35.68 KG/M2 | DIASTOLIC BLOOD PRESSURE: 90 MMHG | SYSTOLIC BLOOD PRESSURE: 124 MMHG

## 2023-05-11 RX ORDER — AMOXICILLIN AND CLAVULANATE POTASSIUM 875; 125 MG/1; MG/1
1 TABLET, FILM COATED ORAL EVERY 12 HOURS SCHEDULED
Qty: 28 TABLET | Refills: 0 | Status: SHIPPED | OUTPATIENT
Start: 2023-05-11 | End: 2023-05-25

## 2023-05-11 NOTE — PROGRESS NOTES
"[facility] [address], [address], [place], [place]    Assessment/Plan:  No problem-specific Assessment & Plan notes found for this encounter  Diagnoses and all orders for this visit:    Retained products of conception after delivery without hemorrhage  -     amoxicillin-clavulanate (AUGMENTIN) 875-125 mg per tablet; Take 1 tablet by mouth every 12 (twelve) hours for 14 days          Subjective:   Tiffanie Jason is a 44 y o  [id number] female  CC: bleeding postpartum    HPI: HPI  Patient presents for follow up after being seen in the ER for increased vaginal bleeding  Workup shows likely retained productions  Will start broad spectrum antibiotics and schedule for suction D&E  Patient states the bleeding has improved  Procedure reviewed and consents obtained today  ROS: Review of Systems   Constitutional: Negative  Respiratory: Negative  Cardiovascular: Negative  Gastrointestinal: Negative  Genitourinary: Positive for vaginal bleeding  Musculoskeletal: Negative  All other systems reviewed and are negative  PFSH: The following portions of the patient's history were reviewed and updated as appropriate: allergies, current medications, past family history, past medical history, obstetric history, gynecologic history, past social history, past surgical history and problem list        Objective:  /90   Ht 5' 6\" (1 676 m)   Wt 101 kg (222 lb)   LMP 07/13/2022   Breastfeeding Yes   BMI 35 83 kg/m²    Physical Exam  Vitals reviewed  Constitutional:       Appearance: Normal appearance  Cardiovascular:      Rate and Rhythm: Normal rate  Pulmonary:      Effort: Pulmonary effort is normal  No respiratory distress  Neurological:      Mental Status: She is alert     Psychiatric:         Mood and Affect: Mood normal          Behavior: Behavior normal          "

## 2023-05-11 NOTE — ED ATTENDING ATTESTATION
5/10/2023      Teo Michaels DO, saw and evaluated the patient  I have discussed the patient with Dr Shira Tuttle and agree with his findings, Plan of Care, and MDM as documented in his note, except where noted  All available labs and Radiology studies were reviewed  I was present for key portions of any procedure(s) performed by Dr Shira Tuttle, and I was immediately available to provide assistance  At this point I agree with the current assessment done in the Emergency Department  I have conducted an independent evaluation of this patient  The history and physical is as follows:    45 y/o  woman who had  on 6 May 2023 at [facility] without complication presents to the ED with passage of large amount of clotted blood and suprapubic abdominal pain for the past several days  Bleeding is most noticeable when she urinates as she will pass large clots at that point  She feels that the pain is present in her lower abdomen with no pain in the vulva or vagina  Has been taking acetaminophen and ibuprofen without significant improvement in symptoms  Has obvious hematuria due to vaginal bleeding but no dysuria or urgency/frequency  Has had normal bowel movement without any change in abd pain  No epistaxis/hemoptysis/hematemesis  She does not take any anticoagulants or antiplatelets  The pregnancy itself was uncomplicated; her child is doing well  During delivery she did not sustain any lacerations/tears that required repair  She does not have problems with prolonged bleeding from superficial wounds  She does breastfeed  Cholecystectomy; no prior  surgery  ROS as per HPI; otherwise normal     General: Awake/alert/no distress  Vital signs and nursing notes reviewed    HEENT:  Normocephalic/atraumatic  External ear/hearing wnl bilaterally  Neck:  Phonation normal with no stridor/dysphonia  Normal active ROM  No palpable masses or thyromegaly  No overlying skin changes      Cardiac:  Radial pulses 2+ bilaterally  DP/PT pulses 2+ bilaterally  RRR with s1/s2; no m/r/g  Pulmonary:   No respiratory distress  No accessory muscle use  Lungs CTA b/l with no w/c/r    Abdomen:  Nondistended  Mild suprapubic abd ttp without guarding/rebund  No palpable masses   exam: please refer to ED course in Dr Yeni Hodge note    Skin:  Warm/dry  No diaphoresis  No visible rashes or skin changes  Neuro:  GCS 15  PERRLA; EOMI  Facial expressions symmetric  Tongue/uvula midline  Shoulder shrug equal bilaterally  Strength 5/5 in UE/LE bilaterally  Intact sensation in UE/LE bilaterally  A/p: Patient presenting with postpartum vaginal bleeding but hemodynamically stable  Lab tests were obtained to characterize severity of bleeding  She had a normal hemoglobin with normal electrolytes; she remained hemodynamically stable throughout the ED course  Pelvic ultrasound demonstrated findings concerning for RPOC  Consultation was made with OB (Dr Iesha Arshad) regarding management: patient can see the [facility] group tomorrow morning and be evaluated for the need for D&C  Dr Hari Harry also offered the possibility of patient going to [facility] for exam, but patient preferred to go tomorrow after arranging childcare  This is entirely reasonable  I discussed this plan with the patient  All questions were answered to her satisfaction prior to discharge  She expressed understanding and agreed to plan  ED Course  ED Course as of 05/10/23 2153   Wed May 10, 2023   1733 CBC and differential(!)  WBC wnl  Hg/Hct wnl  Plt wnl   1805 Comprehensive metabolic panel(!)  Normal electrolytes  Very minimally elevated alk phos  1805 UA w Reflex to Microscopic w Reflex to Culture(!)  Trace leukocytes with large blood   1858 US completed and awaiting interpretation   2004 US pelvis transabdominal only        IMPRESSION:  Transabdominal imaging only  Patient declined transvaginal imaging    1  Heterogeneously thickened endometrial complex with internal vascularity suspicious for retained products of conception  2   There is increased vascularity at the anterior myometrium abutting the endometrial complex which appears ill-defined  Question related to inflammation, extension of retained products contents considered less likely  2019 US pelvis transabdominal only  IMPRESSION:  Transabdominal imaging only  Patient declined transvaginal imaging  1  Heterogeneously thickened endometrial complex with internal vascularity suspicious for retained products of conception  2   There is increased vascularity at the anterior myometrium abutting the endometrial complex which appears ill-defined   Question related to inflammation, extension of retained products contents considered less likely             Critical Care Time  Procedures

## 2023-05-11 NOTE — DISCHARGE INSTRUCTIONS
You have been instructed to contact OB/GYN care [address] [place] [address] tomorrow and go to an appointment scheduled for you then  They also requested that should your bleeding or pelvic pain worsen try to go to [facility] but if you are unable to make it please return to the emergency department immediately  You may continue to take Tylenol or Motrin alternating every 3-4 hours as needed for pain or fever  Please take Motrin with small meals to avoid upset stomach  Should you develop chest pain, shortness of breath, syncope, fever or any other symptoms that you find concerning please return to the emergency department immediately

## 2023-05-12 ENCOUNTER — PREP FOR PROCEDURE (OUTPATIENT)
Dept: OBGYN CLINIC | Facility: MEDICAL CENTER | Age: 38
End: 2023-05-12

## 2023-05-12 RX ORDER — SODIUM CHLORIDE, SODIUM LACTATE, POTASSIUM CHLORIDE, CALCIUM CHLORIDE 600; 310; 30; 20 MG/100ML; MG/100ML; MG/100ML; MG/100ML
125 INJECTION, SOLUTION INTRAVENOUS CONTINUOUS
Status: CANCELLED | OUTPATIENT
Start: 2023-05-15

## 2023-05-12 NOTE — H&P
[facility] [address], [address], [place], [place]    Assessment/Plan:  No problem-specific Assessment & Plan notes found for this encounter  Diagnoses and all orders for this visit:    Retained products of conception after delivery without hemorrhage  -     Case request operating room: DILATATION AND EVACUATION (D&E) SUCTION RETAINED PRODUCTS OF CONCEPTION AFTER DELIVERY; Standing  -     Case request operating room: DILATATION AND EVACUATION (D&E) SUCTION RETAINED PRODUCTS OF CONCEPTION AFTER DELIVERY    Other orders  -     Diet NPO; Sips with meds; Standing  -     Void on call to OR; Standing  -     Insert peripheral IV; Standing  -     CBC with differential; Standing  -     Urine pregnancy test-Holding area only; Standing  -     lactated ringers infusion  -     Place sequential compression device; Standing          Subjective:   Keyla Holbrook is a 44 y o  [id number] female  CC: bleeding postpartum    HPI: HPI  Patient presents for follow up after being seen in the ER for increased vaginal bleeding  Workup shows likely retained productions  Will start broad spectrum antibiotics and schedule for suction D&E  Patient states the bleeding has improved  Procedure reviewed and consents obtained today  ROS: Review of Systems   Constitutional: Negative  Respiratory: Negative  Cardiovascular: Negative  Gastrointestinal: Negative  Genitourinary: Positive for vaginal bleeding  Musculoskeletal: Negative  All other systems reviewed and are negative  PFSH: The following portions of the patient's history were reviewed and updated as appropriate: allergies, current medications, past family history, past medical history, obstetric history, gynecologic history, past social history, past surgical history and problem list        Objective:  LMP 07/13/2022    Physical Exam  Vitals reviewed  Constitutional:       Appearance: Normal appearance     Cardiovascular: Rate and Rhythm: Normal rate  Pulmonary:      Effort: Pulmonary effort is normal  No respiratory distress  Neurological:      Mental Status: She is alert     Psychiatric:         Mood and Affect: Mood normal          Behavior: Behavior normal

## 2023-05-13 LAB — PLACENTA IN STORAGE: NORMAL

## 2023-05-15 ENCOUNTER — ANESTHESIA EVENT (OUTPATIENT)
Dept: PERIOP | Facility: HOSPITAL | Age: 38
End: 2023-05-15

## 2023-05-15 ENCOUNTER — ANESTHESIA (OUTPATIENT)
Dept: PERIOP | Facility: HOSPITAL | Age: 38
End: 2023-05-15

## 2023-05-15 ENCOUNTER — HOSPITAL ENCOUNTER (OUTPATIENT)
Facility: HOSPITAL | Age: 38
Setting detail: OUTPATIENT SURGERY
Discharge: HOME | End: 2023-05-15
Attending: OBSTETRICS & GYNECOLOGY | Admitting: OBSTETRICS & GYNECOLOGY

## 2023-05-15 VITALS
HEART RATE: 82 BPM | SYSTOLIC BLOOD PRESSURE: 138 MMHG | RESPIRATION RATE: 16 BRPM | WEIGHT: 218.03 LBS | DIASTOLIC BLOOD PRESSURE: 72 MMHG | TEMPERATURE: 98.1 F | BODY MASS INDEX: 35.04 KG/M2 | OXYGEN SATURATION: 99 % | HEIGHT: 66 IN

## 2023-05-15 LAB
BASOPHILS # BLD AUTO: 0.05 THOUSANDS/ÂΜL (ref 0–0.1)
BASOPHILS NFR BLD AUTO: 1 % (ref 0–1)
EOSINOPHIL # BLD AUTO: 0.36 THOUSAND/ÂΜL (ref 0–0.61)
EOSINOPHIL NFR BLD AUTO: 5 % (ref 0–6)
ERYTHROCYTE [DISTWIDTH] IN BLOOD BY AUTOMATED COUNT: 18.6 % (ref 11.6–15.1)
EXT PREGNANCY TEST URINE: NEGATIVE
EXT. CONTROL: NORMAL
HCT VFR BLD AUTO: 43 % (ref 34.8–46.1)
HGB BLD-MCNC: 13.9 G/DL (ref 11.5–15.4)
IMM GRANULOCYTES # BLD AUTO: 0.02 THOUSAND/UL (ref 0–0.2)
IMM GRANULOCYTES NFR BLD AUTO: 0 % (ref 0–2)
LYMPHOCYTES # BLD AUTO: 2.1 THOUSANDS/ÂΜL (ref 0.6–4.47)
LYMPHOCYTES NFR BLD AUTO: 28 % (ref 14–44)
MCH RBC QN AUTO: 25 PG (ref 26.8–34.3)
MCHC RBC AUTO-ENTMCNC: 32.3 G/DL (ref 31.4–37.4)
MCV RBC AUTO: 77 FL (ref 82–98)
MONOCYTES # BLD AUTO: 0.46 THOUSAND/ÂΜL (ref 0.17–1.22)
MONOCYTES NFR BLD AUTO: 6 % (ref 4–12)
NEUTROPHILS # BLD AUTO: 4.48 THOUSANDS/ÂΜL (ref 1.85–7.62)
NEUTS SEG NFR BLD AUTO: 60 % (ref 43–75)
NRBC BLD AUTO-RTO: 0 /100 WBCS
PLATELET # BLD AUTO: 383 THOUSANDS/UL (ref 149–390)
PMV BLD AUTO: 9.9 FL (ref 8.9–12.7)
RBC # BLD AUTO: 5.57 MILLION/UL (ref 3.81–5.12)
WBC # BLD AUTO: 7.47 THOUSAND/UL (ref 4.31–10.16)

## 2023-05-15 RX ORDER — FENTANYL CITRATE/PF 50 MCG/ML
25 SYRINGE (ML) INJECTION
Status: DISCONTINUED | OUTPATIENT
Start: 2023-05-15 | End: 2023-05-15 | Stop reason: HOSPADM

## 2023-05-15 RX ORDER — SODIUM CHLORIDE, SODIUM LACTATE, POTASSIUM CHLORIDE, CALCIUM CHLORIDE 600; 310; 30; 20 MG/100ML; MG/100ML; MG/100ML; MG/100ML
125 INJECTION, SOLUTION INTRAVENOUS CONTINUOUS
Status: DISCONTINUED | OUTPATIENT
Start: 2023-05-15 | End: 2023-05-15 | Stop reason: HOSPADM

## 2023-05-15 RX ORDER — ONDANSETRON 2 MG/ML
4 INJECTION INTRAMUSCULAR; INTRAVENOUS ONCE AS NEEDED
Status: DISCONTINUED | OUTPATIENT
Start: 2023-05-15 | End: 2023-05-15 | Stop reason: HOSPADM

## 2023-05-15 RX ORDER — KETOROLAC TROMETHAMINE 30 MG/ML
INJECTION, SOLUTION INTRAMUSCULAR; INTRAVENOUS AS NEEDED
Status: DISCONTINUED | OUTPATIENT
Start: 2023-05-15 | End: 2023-05-15

## 2023-05-15 RX ORDER — ACETAMINOPHEN 325 MG/1
975 TABLET ORAL EVERY 6 HOURS PRN
Status: DISCONTINUED | OUTPATIENT
Start: 2023-05-15 | End: 2023-05-15 | Stop reason: HOSPADM

## 2023-05-15 RX ORDER — PROPOFOL 10 MG/ML
INJECTION, EMULSION INTRAVENOUS CONTINUOUS PRN
Status: DISCONTINUED | OUTPATIENT
Start: 2023-05-15 | End: 2023-05-15

## 2023-05-15 RX ORDER — IBUPROFEN 600 MG/1
600 TABLET ORAL EVERY 6 HOURS PRN
Status: DISCONTINUED | OUTPATIENT
Start: 2023-05-15 | End: 2023-05-15 | Stop reason: HOSPADM

## 2023-05-15 RX ORDER — LIDOCAINE HYDROCHLORIDE 20 MG/ML
INJECTION, SOLUTION EPIDURAL; INFILTRATION; INTRACAUDAL; PERINEURAL AS NEEDED
Status: DISCONTINUED | OUTPATIENT
Start: 2023-05-15 | End: 2023-05-15

## 2023-05-15 RX ORDER — ONDANSETRON 2 MG/ML
INJECTION INTRAMUSCULAR; INTRAVENOUS AS NEEDED
Status: DISCONTINUED | OUTPATIENT
Start: 2023-05-15 | End: 2023-05-15

## 2023-05-15 RX ORDER — FENTANYL CITRATE 50 UG/ML
INJECTION, SOLUTION INTRAMUSCULAR; INTRAVENOUS AS NEEDED
Status: DISCONTINUED | OUTPATIENT
Start: 2023-05-15 | End: 2023-05-15

## 2023-05-15 RX ORDER — SUCCINYLCHOLINE/SOD CL,ISO/PF 100 MG/5ML
SYRINGE (ML) INTRAVENOUS AS NEEDED
Status: DISCONTINUED | OUTPATIENT
Start: 2023-05-15 | End: 2023-05-15

## 2023-05-15 RX ORDER — PROPOFOL 10 MG/ML
INJECTION, EMULSION INTRAVENOUS AS NEEDED
Status: DISCONTINUED | OUTPATIENT
Start: 2023-05-15 | End: 2023-05-15

## 2023-05-15 RX ADMIN — KETOROLAC TROMETHAMINE 30 MG: 30 INJECTION, SOLUTION INTRAMUSCULAR at 14:45

## 2023-05-15 RX ADMIN — LIDOCAINE HYDROCHLORIDE 80 MG: 20 INJECTION, SOLUTION EPIDURAL; INFILTRATION; INTRACAUDAL; PERINEURAL at 14:21

## 2023-05-15 RX ADMIN — FENTANYL CITRATE 50 MCG: 50 INJECTION INTRAMUSCULAR; INTRAVENOUS at 14:21

## 2023-05-15 RX ADMIN — PROPOFOL 150 MCG/KG/MIN: 10 INJECTION, EMULSION INTRAVENOUS at 14:21

## 2023-05-15 RX ADMIN — ONDANSETRON 4 MG: 2 INJECTION INTRAMUSCULAR; INTRAVENOUS at 14:25

## 2023-05-15 RX ADMIN — PROPOFOL 50 MG: 10 INJECTION, EMULSION INTRAVENOUS at 14:22

## 2023-05-15 RX ADMIN — FENTANYL CITRATE 25 MCG: 50 INJECTION INTRAMUSCULAR; INTRAVENOUS at 15:06

## 2023-05-15 RX ADMIN — FENTANYL CITRATE 25 MCG: 50 INJECTION INTRAMUSCULAR; INTRAVENOUS at 15:13

## 2023-05-15 RX ADMIN — SODIUM CHLORIDE, SODIUM LACTATE, POTASSIUM CHLORIDE, AND CALCIUM CHLORIDE 125 ML/HR: .6; .31; .03; .02 INJECTION, SOLUTION INTRAVENOUS at 13:13

## 2023-05-15 RX ADMIN — IBUPROFEN 600 MG: 600 TABLET, FILM COATED ORAL at 15:41

## 2023-05-15 RX ADMIN — Medication 100 MG: at 14:21

## 2023-05-15 RX ADMIN — PROPOFOL 250 MG: 10 INJECTION, EMULSION INTRAVENOUS at 14:21

## 2023-05-15 RX ADMIN — FENTANYL CITRATE 50 MCG: 50 INJECTION INTRAMUSCULAR; INTRAVENOUS at 14:25

## 2023-05-15 NOTE — OP NOTE
OPERATIVE REPORT  PATIENT NAME: Nena Clinton    :  1985  MRN: [id number]  Pt Location: [facility] OR ROOM 03    SURGERY DATE: 5/15/2023    Surgeon(s) and Role:     * Gadiel Heart MD - Primary     * Dakota Denson MD - Assisting    Preop Diagnosis:  Retained products of conception after delivery without hemorrhage [O73 1]    Post-Op Diagnosis Codes:     * Retained products of conception after delivery without hemorrhage [O73 1]    Procedure(s):  (D&E) SUCTION RETAINED PRODUCTS OF CONCEPTION AFTER DELIVERY    Specimen(s):  ID Type Source Tests Collected by Time Destination   1 : retained products  Tissue Products of Conception TISSUE EXAM Gadiel Heart MD 5/15/2023 1430        Estimated Blood Loss:   Minimal    Drains:  * No LDAs found *    Anesthesia Type:   General    Operative Indications:  Retained products of conception after delivery without hemorrhage [O73 1]    Operative Findings:  1  External genitalia grossly normal in appearance  No ulcerations, no lacerations, no lesions  2   Vagina and cervix were  grossly normal in appearance without any lacerations or lesions  3  Uterus sounded to 12 cm  Complications:   None apparent     Procedure and Technique:  The patient was taken to the operating room where she was properly identified  She was placed under general LMA anesthesia without difficulty  She was prepped and draped in the normal sterile fashion in the dorsal lithotomy position using the stirrups  Care was taken to avoid excessive flexion or extension of the patients hips and knees or pressure on her extremities  A time out was performed and everyone was in agreement  The patient was on Augmentin as an outpatient and was not given doxycycline preoperatively  The bladder was drained with a straight cath for 250 of clear urine  A weighted speculum was placed in the patient's vagina and the anterior lip of the cervix was grasped with a single tooth tenaculum   A 12 cm curved tip was selected  This was advanced to the fundus and suction was activated  Products of conception were collected in the suction trap  3 additional passes were made with the suction tip until evacuation was determined to be adequate  All instruments were removed from the patient's vagina  Tenaculum was removed from the anterior lip of the cervix and good hemostasis was noted at the puncture sites  The patient tolerated the procedure well  Sponge, instrument and needle counts were correct times 2  The patient was awakened from anesthesia and taken to the recovery room in stable condition  Dr Christina Elam  was present for the entire procedure      Patient Disposition:  PACU         SIGNATURE: Shelly Wyman MD  DATE: May 15, 2023  TIME: 2:48 PM

## 2023-05-15 NOTE — ANESTHESIA PREPROCEDURE EVALUATION
Procedure:  (D&E) SUCTION RETAINED PRODUCTS OF CONCEPTION AFTER DELIVERY (Uterus)    Relevant Problems   GI/HEPATIC   (+) Gastroesophageal reflux disease without esophagitis      GYN   (+) Multigravida of advanced maternal age in third trimester      Endocrine   (+) PCOS (polycystic ovarian syndrome)      Other   (+) Sickle cell trait in mother affecting pregnancy [facility])        Physical Exam    Airway    Mallampati score: II         Dental       Cardiovascular  Rhythm: regular, Rate: normal,     Pulmonary  Breath sounds clear to auscultation,     Other Findings        Anesthesia Plan  ASA Score- 2 Emergent    Anesthesia Type- general with ASA Monitors  Additional Monitors:   Airway Plan: ETT  Plan Factors-Exercise tolerance (METS): >4 METS  Chart reviewed  Existing labs reviewed  Patient summary reviewed  Patient is not a current smoker  Patient not instructed to abstain from smoking on day of procedure  Patient did not smoke on day of surgery  Obstructive sleep apnea risk education given perioperatively  Induction- intravenous  Postoperative Plan-     Informed Consent- Anesthetic plan and risks discussed with patient

## 2023-05-15 NOTE — ANESTHESIA POSTPROCEDURE EVALUATION
Post-Op Assessment Note    CV Status:  Stable    Pain management: adequate     Mental Status:  Alert and awake   Hydration Status:  Euvolemic   PONV Controlled:  Controlled   Airway Patency:  Patent      Post Op Vitals Reviewed: Yes      Staff: Anesthesiologist         No notable events documented      /61 (05/15/23 1520)    Temp (!) 97 3 °F (36 3 °C) (05/15/23 1520)    Pulse 76 (05/15/23 1520)   Resp 12 (05/15/23 1520)    SpO2 95 % (05/15/23 1520)

## 2023-07-07 ENCOUNTER — OFFICE VISIT (OUTPATIENT)
Dept: OBGYN CLINIC | Facility: MEDICAL CENTER | Age: 38
End: 2023-07-07
Payer: COMMERCIAL

## 2023-07-07 VITALS
SYSTOLIC BLOOD PRESSURE: 100 MMHG | BODY MASS INDEX: 33.91 KG/M2 | HEIGHT: 66 IN | DIASTOLIC BLOOD PRESSURE: 70 MMHG | WEIGHT: 211 LBS

## 2023-07-07 DIAGNOSIS — R10.2 PELVIC PAIN: Primary | ICD-10-CM

## 2023-07-07 DIAGNOSIS — Z30.45 ENCOUNTER FOR SURVEILLANCE OF TRANSDERMAL PATCH HORMONAL CONTRACEPTIVE DEVICE: ICD-10-CM

## 2023-07-07 PROBLEM — O09.523 MULTIGRAVIDA OF ADVANCED MATERNAL AGE IN THIRD TRIMESTER: Status: RESOLVED | Noted: 2023-03-21 | Resolved: 2023-07-07

## 2023-07-07 NOTE — PROGRESS NOTES
Postpartum Visit   [facility] [address] [place] [address]    Assessment/Plan:  Swati Ruiz is a 43y.o. year old [id number] who presents for postpartum visit. Routine Postpartum Care  - Normal postpartum exam  - Contraception: patch, BS  - Feeding: breast and formula  - Psychosocial support: appropraite  - Patient Education: Postpartum resources including Pelvic Health PT and Baby & Me  - Cervical cancer screening Up to Date      Additional Problems:  1. Pelvic pain  -     US pelvis complete w transvaginal; Future    2. Encounter for surveillance of transdermal patch hormonal contraceptive device  -     norelgestromin-ethinyl estradiol (ORTHO EVRA) 150-35 MCG/24HR; Place 1 patch on the skin over 7 days once a week          Subjective:     CC: Postpartum visit    Phyllis Estrada is a 43 y.o. female [id number] who presents for a postpartum visit. She is  postpartum following a  on 23 at 38 weeks. Postpartum course has been complicated by retained products with return to OR for D&C. Bleeding staining only. Bowel function is normal. Bladder function is normal. Patient is sexually active. Contraception method is patch bridge to sterilization. Still reports increased cramping and pain. The following portions of the patient's history were reviewed and updated as appropriate: allergies, current medications, past family history, past medical history, obstetric history, gynecologic history, past social history, past surgical history and problem list.      Objective:  /70 (BP Location: Right arm)   Ht 5' 6" (1.676 m)   Wt 95.7 kg (211 lb)   Breastfeeding Yes   BMI 34.06 kg/m²   Pregravid Weight/BMI: No episode found (BMI Could not be calculated)  Current Weight: 95.7 kg (211 lb)   Total Weight Gain: Not found.    Pre- Vitals    Flowsheet Row Most Recent Value   Prenatal Assessment    Prenatal Vitals    Blood Pressure 100/70   Weight - Scale 95.7 kg (211 lb) Urine Albumin/Glucose    Dilation/Effacement/Station    Vaginal Drainage    Edema            General: Well appearing, no distress. Mood and affect: Appropriate. Respiratory: Unlabored breathing. Clear to auscultate. Cardiovascular: Regular rate and rhythm. Abdomen: Soft, nontender  Extremities: Warm and well perfused. Non tender.

## 2023-07-10 ENCOUNTER — TELEPHONE (OUTPATIENT)
Dept: OBGYN CLINIC | Facility: MEDICAL CENTER | Age: 38
End: 2023-07-10

## 2023-07-10 NOTE — TELEPHONE ENCOUNTER
----- Message from Shlomo Vega MD sent at 7/7/2023 12:38 PM EDT -----  Please schedule for bilateral salpingectomy. Thanks! ALEXY Bello signed in April.

## 2023-07-11 ENCOUNTER — TELEPHONE (OUTPATIENT)
Dept: OBGYN CLINIC | Facility: MEDICAL CENTER | Age: 38
End: 2023-07-11

## 2023-07-12 ENCOUNTER — TELEPHONE (OUTPATIENT)
Dept: OBGYN CLINIC | Facility: MEDICAL CENTER | Age: 38
End: 2023-07-12

## 2023-07-17 ENCOUNTER — HOSPITAL ENCOUNTER (OUTPATIENT)
Dept: ULTRASOUND IMAGING | Facility: HOSPITAL | Age: 38
Discharge: HOME | End: 2023-07-17
Attending: OBSTETRICS & GYNECOLOGY
Payer: COMMERCIAL

## 2023-07-17 DIAGNOSIS — R10.2 PELVIC PAIN: ICD-10-CM

## 2023-07-17 PROCEDURE — 76830 TRANSVAGINAL US NON-OB: CPT

## 2023-07-17 PROCEDURE — 76856 US EXAM PELVIC COMPLETE: CPT

## 2023-07-24 ENCOUNTER — CONSULT (OUTPATIENT)
Dept: OBGYN CLINIC | Facility: CLINIC | Age: 38
End: 2023-07-24

## 2023-07-24 VITALS
HEIGHT: 66 IN | SYSTOLIC BLOOD PRESSURE: 118 MMHG | DIASTOLIC BLOOD PRESSURE: 85 MMHG | BODY MASS INDEX: 34.39 KG/M2 | WEIGHT: 214 LBS

## 2023-07-24 DIAGNOSIS — Z30.2 REQUEST FOR STERILIZATION: Primary | ICD-10-CM

## 2023-07-24 PROCEDURE — PREOP: Performed by: OBSTETRICS & GYNECOLOGY

## 2023-07-24 RX ORDER — OXYCODONE HYDROCHLORIDE 5 MG/1
5 TABLET ORAL EVERY 4 HOURS PRN
Qty: 6 TABLET | Refills: 0 | Status: SHIPPED | OUTPATIENT
Start: 2023-07-24

## 2023-07-24 NOTE — PATIENT INSTRUCTIONS
Pre-Surgery Instructions:   Medication Instructions    norelgestromin-ethinyl estradiol (ORTHO EVRA) 150-35 MCG/24HR Take morning of surgery    omeprazole (PriLOSEC) 20 mg delayed release capsule per anesthesia guidelines     oxyCODONE (Roxicodone) 5 immediate release tablet Postop medication

## 2023-07-24 NOTE — H&P
Assessment /Plan:     43 y.o. [id number] with satified parity for history and physical for D&C c with hysteroscopy and bilateral salpingectomy. All risks of the procedure were discussed with [facility] in detail. The risks include but are not limited to infection, bleeding, transfusion, damage to adjacent organs/nerves requiring immediate and/or delayed repair, clots inside blood vessels, need to complete procedure using alternative approach, procedural failure, worsening of pre-exisiting medical condition, and death were reviewed with patient. All alternatives to the surgery were discussed. [facility] desires to proceed with above procedure at [facility] on 2023 . Consent was reviewed in detail and signed today. Surgery folder reviewed with patient in detail. Preoperative testing and antibiotics were discussed and ordered. Postoperative course discussed and post op medications were reviewed. A prescription for medication for postoperative pain was given today. Chorhexidine body wash given and instructions reviewed with patient. CC: satisfied parity, thickened endometrium    HPI:  Pt is a 43 y.o. [id number] with satisfied parity for bilateral salpingectomy. Patient has not had her cycle since the her D&C. She was sent for an ultrasound for pelvic pain and was noted to have a thickened endometrium.  Concern for asherman's   PMHx:   Past Medical History:   Diagnosis Date   • Polycystic ovary syndrome    • Sickle cell trait (HCC)        PSHx:   Past Surgical History:   Procedure Laterality Date   • CHOLECYSTECTOMY     • KS TX MISSED  FIRST TRIMESTER SURGICAL N/A 5/15/2023    Procedure: (D&E) SUCTION RETAINED PRODUCTS OF CONCEPTION AFTER DELIVERY;  Surgeon: Valeriy Sandoval MD;  Location: [facility] OR;  Service: Gynecology       Meds:   Current Outpatient Medications:   •  norelgestromin-ethinyl estradiol (ORTHO EVRA) 150-35 MCG/24HR, Place 1 patch on the skin over 7 days once a week, Disp: 3 patch, Rfl: 11  •  omeprazole (PriLOSEC) 20 mg delayed release capsule, Take 20 mg by mouth daily as needed PRN, Disp: , Rfl:   •  acetaminophen (TYLENOL) 325 mg tablet, Take 2 tablets (650 mg total) by mouth every 6 (six) hours as needed for mild pain or moderate pain (Patient not taking: Reported on 2023), Disp: , Rfl: 0  •  ibuprofen (MOTRIN) 600 mg tablet, Take 1 tablet (600 mg total) by mouth every 6 (six) hours as needed for moderate pain or mild pain (Patient not taking: Reported on 2023), Disp: 30 tablet, Rfl: 0  •  Prenatal w/o A Vit-Fe Fum-FA (PRENATA PO), Take by mouth (Patient not taking: Reported on 2023), Disp: , Rfl:       Allergies:    Allergies   Allergen Reactions   • Shellfish Allergy - Food Allergy Hives   • Shrimp Extract Allergy Skin Test - Food Allergy Hives     Only when she touches, but she can eat them       Social Hx:    Social History     Socioeconomic History   • Marital status: Single     Spouse name: Not on file   • Number of children: Not on file   • Years of education: Not on file   • Highest education level: Not on file   Occupational History   • Not on file   Tobacco Use   • Smoking status: Never   • Smokeless tobacco: Never   Vaping Use   • Vaping Use: Never used   Substance and Sexual Activity   • Alcohol use: Not Currently   • Drug use: Never   • Sexual activity: Yes     Partners: Male   Other Topics Concern   • Not on file   Social History Narrative   • Not on file     Social Determinants of Health     Financial Resource Strain: Not on file   Food Insecurity: Not on file   Transportation Needs: Not on file   Physical Activity: Not on file   Stress: Not on file   Social Connections: Not on file   Intimate Partner Violence: Not on file   Housing Stability: Not on file       Ob Hx:   OB History    Para Term  AB Living   6 3 3   3 3   SAB IAB Ectopic Multiple Live Births   2       3      # Outcome Date GA Lbr New/2nd Weight Sex Delivery Anes PTL Lv   6 Term 23 38w6d / 00:05 2850 g (6 lb 4.5 oz) M Vag-Spont EPI  KARLA   5 SAB 12/2021 4w0d          4 SAB 10/2021 7w0d          3 Term 12/24/17 40w0d  3884 g (8 lb 9 oz) M Vag-Spont  N KARLA   2 AB 2016 8w0d          1 Term 10/05/04 40w0d  3430 g (7 lb 9 oz) F Vag-Spont   KARLA       Gyn HX:  denies STD, abnormal pap, significant dysmenorrhea or irregular menses    Fm Hx:   Family History   Problem Relation Age of Onset   • Diabetes Mother    • Hypertension Mother    • Kidney disease Mother    • Diabetes Father    • Hypertension Father    • No Known Problems Daughter    • No Known Problems Son    • Diabetes Maternal Grandmother    • Alzheimer's disease Maternal Grandfather    • Liver cancer Paternal Grandmother    • Breast cancer Neg Hx    • Colon cancer Neg Hx    • Ovarian cancer Neg Hx        Review of Systems:  Constitutional :no fever, feels well, no tiredness, no recent weight gain or loss  ENT: no ear ache, no loss of hearing, no nosebleeds or nasal discharge, no sore throat or hoarseness. Cardiovascular: no complaints of slow or fast heart beat, no chest pain, no palpitations, no leg claudication or lower extremity edema. Respiratory: no complaints of shortness of shortness of breath, no QUEZADA  Breasts:no complaints of breast pain, breast lump, or nipple discharge  Gastrointestinal: no complaints of abdominal pain, constipation,nausea, vomiting, or diarrhea or bloody stools  Genitourinary : as noted in HPI.   Integumentary: no complaints of skin rash or lesion, itching or dry skin  Neurological: no complaints of headache, no confusion, no numbness or tingling, no dizziness or fainting    Objective        /85   Ht 5' 6" (1.676 m)   Wt 97.1 kg (214 lb)   Breastfeeding No Comment: bottle  BMI 34.54 kg/m²     General:   appears stated age, cooperative, alert normal mood and affect   Neck: Neck: normal, supple,trachea midline, no masses   Heart: regular rate and rhythm, S1, S2 normal, no murmur, click, rub or gallop Lungs: clear to auscultation bilaterally   Abdomen: soft, non-tender, without masses or organomegaly     FINDINGS:     UTERUS:  The uterus is anteverted in position, measuring 10.1 x 5.6 x 7.9 cm. The uterus has a normal contour and echotexture. Nabothian cyst(s) present, cervix otherwise within normal limits.     ENDOMETRIUM:  The endometrial echo complex has an AP caliber of 10.0 mm. It has abnormal echogenicity with cystic areas. Findings raise concern for endometrial hyperplasia and require additional evaluation.     OVARIES/ADNEXA:  Right ovary:  4.6 x 1.9 x 2.6 cm. 12.1 mL. Left ovary:  2.8 x 3.2 x 3.2 cm. 14.9 mL. Ovarian Doppler flow is within normal limits. No suspicious ovarian or adnexal abnormality.     OTHER:  No free fluid or loculated fluid collections.        IMPRESSION:  Heterogeneous endometrium with cystic change. Findings raise concern for endometrial hyperplasia and require additional evaluation. The study was marked in EPIC for significant notification.

## 2023-07-28 ENCOUNTER — ANESTHESIA EVENT (OUTPATIENT)
Dept: PERIOP | Facility: HOSPITAL | Age: 38
End: 2023-07-28
Payer: COMMERCIAL

## 2023-07-31 ENCOUNTER — HOSPITAL ENCOUNTER (OUTPATIENT)
Facility: HOSPITAL | Age: 38
Setting detail: OUTPATIENT SURGERY
Discharge: HOME | End: 2023-07-31
Attending: OBSTETRICS & GYNECOLOGY | Admitting: OBSTETRICS & GYNECOLOGY
Payer: COMMERCIAL

## 2023-07-31 ENCOUNTER — ANESTHESIA (OUTPATIENT)
Dept: PERIOP | Facility: HOSPITAL | Age: 38
End: 2023-07-31
Payer: COMMERCIAL

## 2023-07-31 VITALS
SYSTOLIC BLOOD PRESSURE: 128 MMHG | RESPIRATION RATE: 18 BRPM | OXYGEN SATURATION: 98 % | HEIGHT: 66 IN | WEIGHT: 213.19 LBS | DIASTOLIC BLOOD PRESSURE: 76 MMHG | HEART RATE: 79 BPM | BODY MASS INDEX: 34.26 KG/M2 | TEMPERATURE: 97.4 F

## 2023-07-31 DIAGNOSIS — Z30.2 ENCOUNTER FOR STERILIZATION: ICD-10-CM

## 2023-07-31 PROBLEM — Z98.890 STATUS POST DILATION AND CURETTAGE: Status: ACTIVE | Noted: 2023-07-31

## 2023-07-31 PROBLEM — Z90.79 STATUS POST BILATERAL SALPINGECTOMY: Status: ACTIVE | Noted: 2023-07-31

## 2023-07-31 LAB
BASOPHILS # BLD AUTO: 0.05 THOUSANDS/ÂΜL (ref 0–0.1)
BASOPHILS NFR BLD AUTO: 1 % (ref 0–1)
EOSINOPHIL # BLD AUTO: 0.19 THOUSAND/ÂΜL (ref 0–0.61)
EOSINOPHIL NFR BLD AUTO: 2 % (ref 0–6)
ERYTHROCYTE [DISTWIDTH] IN BLOOD BY AUTOMATED COUNT: 14.7 % (ref 11.6–15.1)
EXT PREGNANCY TEST URINE: NEGATIVE
EXT. CONTROL: NORMAL
HCT VFR BLD AUTO: 42.9 % (ref 34.8–46.1)
HGB BLD-MCNC: 13.7 G/DL (ref 11.5–15.4)
IMM GRANULOCYTES # BLD AUTO: 0.02 THOUSAND/UL (ref 0–0.2)
IMM GRANULOCYTES NFR BLD AUTO: 0 % (ref 0–2)
LYMPHOCYTES # BLD AUTO: 1.98 THOUSANDS/ÂΜL (ref 0.6–4.47)
LYMPHOCYTES NFR BLD AUTO: 23 % (ref 14–44)
MCH RBC QN AUTO: 25.9 PG (ref 26.8–34.3)
MCHC RBC AUTO-ENTMCNC: 31.9 G/DL (ref 31.4–37.4)
MCV RBC AUTO: 81 FL (ref 82–98)
MONOCYTES # BLD AUTO: 0.43 THOUSAND/ÂΜL (ref 0.17–1.22)
MONOCYTES NFR BLD AUTO: 5 % (ref 4–12)
NEUTROPHILS # BLD AUTO: 5.87 THOUSANDS/ÂΜL (ref 1.85–7.62)
NEUTS SEG NFR BLD AUTO: 69 % (ref 43–75)
NRBC BLD AUTO-RTO: 0 /100 WBCS
PLATELET # BLD AUTO: 341 THOUSANDS/UL (ref 149–390)
PMV BLD AUTO: 10.1 FL (ref 8.9–12.7)
RBC # BLD AUTO: 5.28 MILLION/UL (ref 3.81–5.12)
WBC # BLD AUTO: 8.54 THOUSAND/UL (ref 4.31–10.16)

## 2023-07-31 PROCEDURE — 58558 HYSTEROSCOPY BIOPSY: CPT | Performed by: OBSTETRICS & GYNECOLOGY

## 2023-07-31 PROCEDURE — 58661 LAPAROSCOPY REMOVE ADNEXA: CPT | Performed by: OBSTETRICS & GYNECOLOGY

## 2023-07-31 PROCEDURE — 88305 TISSUE EXAM BY PATHOLOGIST: CPT | Performed by: SPECIALIST

## 2023-07-31 PROCEDURE — 81025 URINE PREGNANCY TEST: CPT | Performed by: ANESTHESIOLOGY

## 2023-07-31 PROCEDURE — 88302 TISSUE EXAM BY PATHOLOGIST: CPT | Performed by: SPECIALIST

## 2023-07-31 PROCEDURE — 85025 COMPLETE CBC W/AUTO DIFF WBC: CPT | Performed by: OBSTETRICS & GYNECOLOGY

## 2023-07-31 RX ORDER — ACETAMINOPHEN 325 MG/1
975 TABLET ORAL EVERY 6 HOURS PRN
Status: DISCONTINUED | OUTPATIENT
Start: 2023-07-31 | End: 2023-07-31 | Stop reason: HOSPADM

## 2023-07-31 RX ORDER — BUPIVACAINE HYDROCHLORIDE 2.5 MG/ML
INJECTION, SOLUTION EPIDURAL; INFILTRATION; INTRACAUDAL AS NEEDED
Status: DISCONTINUED | OUTPATIENT
Start: 2023-07-31 | End: 2023-07-31 | Stop reason: HOSPADM

## 2023-07-31 RX ORDER — PROPOFOL 10 MG/ML
INJECTION, EMULSION INTRAVENOUS AS NEEDED
Status: DISCONTINUED | OUTPATIENT
Start: 2023-07-31 | End: 2023-07-31

## 2023-07-31 RX ORDER — ONDANSETRON 2 MG/ML
4 INJECTION INTRAMUSCULAR; INTRAVENOUS EVERY 6 HOURS PRN
Status: DISCONTINUED | OUTPATIENT
Start: 2023-07-31 | End: 2023-07-31 | Stop reason: HOSPADM

## 2023-07-31 RX ORDER — FENTANYL CITRATE 50 UG/ML
INJECTION, SOLUTION INTRAMUSCULAR; INTRAVENOUS AS NEEDED
Status: DISCONTINUED | OUTPATIENT
Start: 2023-07-31 | End: 2023-07-31

## 2023-07-31 RX ORDER — OXYCODONE HYDROCHLORIDE 5 MG/1
5 TABLET ORAL EVERY 4 HOURS PRN
Status: DISCONTINUED | OUTPATIENT
Start: 2023-07-31 | End: 2023-07-31 | Stop reason: HOSPADM

## 2023-07-31 RX ORDER — MIDAZOLAM HYDROCHLORIDE 2 MG/2ML
INJECTION, SOLUTION INTRAMUSCULAR; INTRAVENOUS AS NEEDED
Status: DISCONTINUED | OUTPATIENT
Start: 2023-07-31 | End: 2023-07-31

## 2023-07-31 RX ORDER — SODIUM CHLORIDE 9 MG/ML
125 INJECTION, SOLUTION INTRAVENOUS CONTINUOUS
Status: DISCONTINUED | OUTPATIENT
Start: 2023-07-31 | End: 2023-07-31

## 2023-07-31 RX ORDER — ONDANSETRON 2 MG/ML
INJECTION INTRAMUSCULAR; INTRAVENOUS AS NEEDED
Status: DISCONTINUED | OUTPATIENT
Start: 2023-07-31 | End: 2023-07-31

## 2023-07-31 RX ORDER — ONDANSETRON 2 MG/ML
4 INJECTION INTRAMUSCULAR; INTRAVENOUS ONCE AS NEEDED
Status: DISCONTINUED | OUTPATIENT
Start: 2023-07-31 | End: 2023-07-31 | Stop reason: HOSPADM

## 2023-07-31 RX ORDER — MAGNESIUM HYDROXIDE 1200 MG/15ML
LIQUID ORAL AS NEEDED
Status: DISCONTINUED | OUTPATIENT
Start: 2023-07-31 | End: 2023-07-31 | Stop reason: HOSPADM

## 2023-07-31 RX ORDER — LIDOCAINE HYDROCHLORIDE 10 MG/ML
INJECTION, SOLUTION EPIDURAL; INFILTRATION; INTRACAUDAL; PERINEURAL AS NEEDED
Status: DISCONTINUED | OUTPATIENT
Start: 2023-07-31 | End: 2023-07-31

## 2023-07-31 RX ORDER — DEXAMETHASONE SODIUM PHOSPHATE 10 MG/ML
INJECTION, SOLUTION INTRAMUSCULAR; INTRAVENOUS AS NEEDED
Status: DISCONTINUED | OUTPATIENT
Start: 2023-07-31 | End: 2023-07-31

## 2023-07-31 RX ORDER — KETOROLAC TROMETHAMINE 30 MG/ML
INJECTION, SOLUTION INTRAMUSCULAR; INTRAVENOUS AS NEEDED
Status: DISCONTINUED | OUTPATIENT
Start: 2023-07-31 | End: 2023-07-31

## 2023-07-31 RX ORDER — FENTANYL CITRATE/PF 50 MCG/ML
50 SYRINGE (ML) INJECTION
Status: DISCONTINUED | OUTPATIENT
Start: 2023-07-31 | End: 2023-07-31 | Stop reason: HOSPADM

## 2023-07-31 RX ORDER — IBUPROFEN 600 MG/1
600 TABLET ORAL EVERY 6 HOURS PRN
Status: DISCONTINUED | OUTPATIENT
Start: 2023-07-31 | End: 2023-07-31 | Stop reason: HOSPADM

## 2023-07-31 RX ORDER — ROCURONIUM BROMIDE 10 MG/ML
INJECTION, SOLUTION INTRAVENOUS AS NEEDED
Status: DISCONTINUED | OUTPATIENT
Start: 2023-07-31 | End: 2023-07-31

## 2023-07-31 RX ADMIN — FENTANYL CITRATE 50 MCG: 50 INJECTION INTRAMUSCULAR; INTRAVENOUS at 14:44

## 2023-07-31 RX ADMIN — MIDAZOLAM 2 MG: 1 INJECTION INTRAMUSCULAR; INTRAVENOUS at 13:20

## 2023-07-31 RX ADMIN — SODIUM CHLORIDE 125 ML/HR: 0.9 INJECTION, SOLUTION INTRAVENOUS at 10:29

## 2023-07-31 RX ADMIN — PROPOFOL 200 MG: 10 INJECTION, EMULSION INTRAVENOUS at 13:31

## 2023-07-31 RX ADMIN — DEXAMETHASONE SODIUM PHOSPHATE 10 MG: 10 INJECTION INTRAMUSCULAR; INTRAVENOUS at 13:36

## 2023-07-31 RX ADMIN — FENTANYL CITRATE 100 MCG: 50 INJECTION INTRAMUSCULAR; INTRAVENOUS at 13:31

## 2023-07-31 RX ADMIN — ROCURONIUM BROMIDE 50 MG: 10 INJECTION, SOLUTION INTRAVENOUS at 13:31

## 2023-07-31 RX ADMIN — LIDOCAINE HYDROCHLORIDE 100 MG: 10 INJECTION, SOLUTION EPIDURAL; INFILTRATION; INTRACAUDAL; PERINEURAL at 13:31

## 2023-07-31 RX ADMIN — FENTANYL CITRATE 50 MCG: 50 INJECTION INTRAMUSCULAR; INTRAVENOUS at 14:11

## 2023-07-31 RX ADMIN — KETOROLAC TROMETHAMINE 30 MG: 30 INJECTION, SOLUTION INTRAMUSCULAR at 14:10

## 2023-07-31 RX ADMIN — ONDANSETRON 4 MG: 2 INJECTION INTRAMUSCULAR; INTRAVENOUS at 14:03

## 2023-07-31 RX ADMIN — FENTANYL CITRATE 50 MCG: 50 INJECTION INTRAMUSCULAR; INTRAVENOUS at 14:21

## 2023-07-31 RX ADMIN — SUGAMMADEX 200 MG: 100 INJECTION, SOLUTION INTRAVENOUS at 14:16

## 2023-07-31 RX ADMIN — FENTANYL CITRATE 50 MCG: 50 INJECTION INTRAMUSCULAR; INTRAVENOUS at 15:05

## 2023-07-31 RX ADMIN — IBUPROFEN 600 MG: 600 TABLET ORAL at 15:52

## 2023-07-31 NOTE — ANESTHESIA POSTPROCEDURE EVALUATION
Post-Op Assessment Note    CV Status:  Stable    Pain management: adequate     Mental Status:  Alert and awake   Hydration Status:  Euvolemic   PONV Controlled:  Controlled   Airway Patency:  Patent      Post Op Vitals Reviewed: Yes      Staff: Anesthesiologist   Reason for prolonged intubation > 24 hours:  Not intubatedReason for prolonged intubation > 48 hours:  Not intubated      No notable events documented.   /67   Pulse 72   Temp 98.2 °F (36.8 °C) (Temporal)   Resp 16   Ht 5' 6" (1.676 m)   Wt 96.7 kg (213 lb 3 oz)   SpO2 93%   Breastfeeding No   BMI 34.41 kg/m²   BP      Temp      Pulse     Resp      SpO2

## 2023-07-31 NOTE — OP NOTE
OPERATIVE REPORT  PATIENT NAME: Corinne Prom    :  1985  MRN: [id number]  Pt Location: [facility] OR ROOM 03    SURGERY DATE: 2023    Surgeon(s) and Role:     * Edilberto Madden MD - Primary     * Talya Crews MD - Assisting    Preop Diagnosis:  Encounter for sterilization [Z30.2]    Post-Op Diagnosis Codes:     * Encounter for sterilization [Z30.2]    Procedure(s) (LRB):  SALPINGECTOMY, LAP (Bilateral)  (D&C) W/ HYSTEROSCOPY (N/A)    Specimen(s):  ID Type Source Tests Collected by Time Destination   1 : [facility] Tissue Endometrium TISSUE EXAM Edilberto Madden MD 2023 1356    2 :  Tissue Fallopian Tubes, Bilateral TISSUE EXAM Edilberto Madden MD 2023 1356        Surgical QBL:  EBL 10 cc    IV fluids:   1400 mL     Drains:  Straight Cath 10 mL    Anesthesia Type:   General, ET     Operative Indications:  Encounter for sterilization [Z30.2]  Retained products of conception   Pelvic Pain    Operative Findings:  1. Grossly normal-appearing external genitalia  2. Normal-appearing uterus and bilateral tubes, ovaries  3. Hemostatic tubal excision sites  4. Hemostatic trocar sites  5. Multiparous, patulous appearing cervix without any lesions  6. Small amount of tissue noted in the uterine cavity near the right cornua. Bilateral ostia were visualized. There was noted to be scarring throughout the uterus with possible Asherman's versus adenomyosis. 7.  Hemostatic tenaculum sites    Complications:   None Apparent    Procedure and Technique:  Brief History    All risks, benefits, and alternatives to the procedure were discussed with the patient and she had the opportunity to ask questions. The risk of regret of procedure was also addressed with the patient and options for LARC was discussed. Patient expressed desire to continue with tubal sterilization. Informed consent was obtained. Description of Procedure  Patient was taken to the operating room.  General endotracheal anesthesia (GET) was administered and the patient was positioned on the OR table in the dorsal lithotomy position. All pressure points were padded and a elmer hugger was placed to maintain control of core body temperature. The patient was prepped and draped in the usual sterile fashion with chloroprep on the abdomen and chlorhexidine prep on the vagina and perineum. Operative Technique  A time out was performed to confirm correct patient and correct procedure. Bupivacaine was injected at the intended incision site. A 5mm incision was made at the inferior edge of the umbilicus for introduction of a 5mm trocar. Trocar was introduced under direct visualization. Pneumoperitoneum was then established to a maximum of 15mmHg. The entire abdomen and pelvis was inspected and there was no evidence of injury to bowel, bladder, vasculature, or other structures. Attention was then turned to the pelvis. Patient was placed in Trendelenburg and the uterus was elevated to visualize the fallopian tubes. There was noted to be grossly normal tubes and ovaries bilaterally. An additional port site was selected in the left lower abdomen approximately 2cm superior and medial to the iliac crest.  Bupivacaine was injected and a 5mm incision was made for introduction of a 5mm trocar under direct visualization at each site. The enseal device was advanced into the abdomen. The left fallopian tube was grasped. The Enseal device was used to ligate along the mesosalpinx, working proximally and taking care to avoid ovarian vasculature. Approximately 2cm from the cornua, the Enseal was used to amputate fallopian tube. This was then withdrawn from the abdominal cavity and sent for pathology. Attention was then turned to the contralateral tube, which was amputated in similar fashion. Good hemostasis was confirmed following salpingectomy. Following salpingectomy, pneumoperitoneum was allowed to escape. Adequate hemostasis was visualized.  The inferior trocar was removed under direct visualization. The laparoscope was withdrawn from the abdomen, followed by its trocar sleeve at the umbilicus. Skin incisions were closed with 4-0 monocryl. Attention was then turned to the vagina. Bladder was drained for 10 cc of clear yellow urine. A denis retractor was placed into the posterior vagina and a edu retractor placed anteriorly to allow for visualization of the cervix. The cervix was grasped with a single tooth tenaculum at the anterior lip. The uterus was sounded to 8cm. The cervix was dilated to accommodate the hysteroscope. The hysteroscope was inserted after adequate dilation was obtained. Findings at time of insertion included the above. Curettage was then performed, until a gritty texture was noted throughout. The single tooth tenaculum was removed and hemostasis of the tenaculum sites noted. All counts were correct and all instruments were removed from the vagina. The patient was awakened and taken to the recovery room in stable condition. Patient tolerated the procedure well and was transferred to PACU in stable condition prior to discharge with follow up in 1-2 weeks. Dr. Abisai Freeman was present and participated in all key portions of the case.         Patient Disposition:  PACU         SIGNATURE: Amina De La Torre MD  DATE: July 31, 2023  TIME: 2:52 PM

## 2023-07-31 NOTE — ANESTHESIA PREPROCEDURE EVALUATION
Procedure:  SALPINGECTOMY, LAP (Bilateral: Uterus)  (D&C) W/ HYSTEROSCOPY (Uterus)    Relevant Problems   GI/HEPATIC   (+) Gastroesophageal reflux disease without esophagitis      Endocrine   (+) PCOS (polycystic ovarian syndrome)        Physical Exam    Airway    Mallampati score: I  TM Distance: >3 FB  Neck ROM: full     Dental   No notable dental hx     Cardiovascular  Rhythm: regular, Rate: normal, Cardiovascular exam normal    Pulmonary  Pulmonary exam normal Breath sounds clear to auscultation,     Other Findings        Anesthesia Plan  ASA Score- 2     Anesthesia Type- general with ASA Monitors. Additional Monitors:   Airway Plan: ETT. Plan Factors-    Chart reviewed. Patient summary reviewed. Patient is not a current smoker. Patient instructed to abstain from smoking on day of procedure. Patient did not smoke on day of surgery. Induction- intravenous. Postoperative Plan-     Informed Consent- Anesthetic plan and risks discussed with patient.

## 2023-08-03 PROCEDURE — 88305 TISSUE EXAM BY PATHOLOGIST: CPT | Performed by: SPECIALIST

## 2023-08-03 PROCEDURE — 88302 TISSUE EXAM BY PATHOLOGIST: CPT | Performed by: SPECIALIST

## 2023-08-09 ENCOUNTER — HOSPITAL ENCOUNTER (EMERGENCY)
Facility: HOSPITAL | Age: 38
Discharge: HOME | End: 2023-08-09
Attending: EMERGENCY MEDICINE
Payer: COMMERCIAL

## 2023-08-09 ENCOUNTER — APPOINTMENT (EMERGENCY)
Dept: CT IMAGING | Facility: HOSPITAL | Age: 38
End: 2023-08-09
Payer: COMMERCIAL

## 2023-08-09 VITALS
RESPIRATION RATE: 20 BRPM | HEART RATE: 60 BPM | TEMPERATURE: 97.9 F | DIASTOLIC BLOOD PRESSURE: 62 MMHG | SYSTOLIC BLOOD PRESSURE: 106 MMHG | OXYGEN SATURATION: 98 %

## 2023-08-09 DIAGNOSIS — R51.9 ACUTE HEADACHE: Primary | ICD-10-CM

## 2023-08-09 LAB
ANION GAP SERPL CALCULATED.3IONS-SCNC: 9 MMOL/L
BASOPHILS # BLD AUTO: 0.06 THOUSANDS/ÂΜL (ref 0–0.1)
BASOPHILS NFR BLD AUTO: 1 % (ref 0–1)
BUN SERPL-MCNC: 13 MG/DL (ref 5–25)
CALCIUM SERPL-MCNC: 10 MG/DL (ref 8.4–10.2)
CHLORIDE SERPL-SCNC: 103 MMOL/L (ref 96–108)
CO2 SERPL-SCNC: 26 MMOL/L (ref 21–32)
CREAT SERPL-MCNC: 0.76 MG/DL (ref 0.6–1.3)
EOSINOPHIL # BLD AUTO: 0.22 THOUSAND/ÂΜL (ref 0–0.61)
EOSINOPHIL NFR BLD AUTO: 3 % (ref 0–6)
ERYTHROCYTE [DISTWIDTH] IN BLOOD BY AUTOMATED COUNT: 14.6 % (ref 11.6–15.1)
EXT PREGNANCY TEST URINE: NEGATIVE
EXT. CONTROL: NORMAL
GFR SERPL CREATININE-BSD FRML MDRD: 99 ML/MIN/1.73SQ M
GLUCOSE SERPL-MCNC: 95 MG/DL (ref 65–140)
HCT VFR BLD AUTO: 45.3 % (ref 34.8–46.1)
HGB BLD-MCNC: 14.7 G/DL (ref 11.5–15.4)
IMM GRANULOCYTES # BLD AUTO: 0.03 THOUSAND/UL (ref 0–0.2)
IMM GRANULOCYTES NFR BLD AUTO: 0 % (ref 0–2)
LYMPHOCYTES # BLD AUTO: 2.44 THOUSANDS/ÂΜL (ref 0.6–4.47)
LYMPHOCYTES NFR BLD AUTO: 33 % (ref 14–44)
MCH RBC QN AUTO: 26.3 PG (ref 26.8–34.3)
MCHC RBC AUTO-ENTMCNC: 32.5 G/DL (ref 31.4–37.4)
MCV RBC AUTO: 81 FL (ref 82–98)
MONOCYTES # BLD AUTO: 0.59 THOUSAND/ÂΜL (ref 0.17–1.22)
MONOCYTES NFR BLD AUTO: 8 % (ref 4–12)
NEUTROPHILS # BLD AUTO: 4.14 THOUSANDS/ÂΜL (ref 1.85–7.62)
NEUTS SEG NFR BLD AUTO: 55 % (ref 43–75)
NRBC BLD AUTO-RTO: 0 /100 WBCS
PLATELET # BLD AUTO: 434 THOUSANDS/UL (ref 149–390)
PMV BLD AUTO: 9.9 FL (ref 8.9–12.7)
POTASSIUM SERPL-SCNC: 4 MMOL/L (ref 3.5–5.3)
RBC # BLD AUTO: 5.59 MILLION/UL (ref 3.81–5.12)
SODIUM SERPL-SCNC: 138 MMOL/L (ref 135–147)
WBC # BLD AUTO: 7.48 THOUSAND/UL (ref 4.31–10.16)

## 2023-08-09 PROCEDURE — 36415 COLL VENOUS BLD VENIPUNCTURE: CPT

## 2023-08-09 PROCEDURE — 85025 COMPLETE CBC W/AUTO DIFF WBC: CPT

## 2023-08-09 PROCEDURE — 81025 URINE PREGNANCY TEST: CPT

## 2023-08-09 PROCEDURE — 80048 BASIC METABOLIC PNL TOTAL CA: CPT

## 2023-08-09 PROCEDURE — G1004 CDSM NDSC: HCPCS

## 2023-08-09 PROCEDURE — 70450 CT HEAD/BRAIN W/O DYE: CPT

## 2023-08-09 RX ORDER — METOCLOPRAMIDE HYDROCHLORIDE 5 MG/ML
10 INJECTION INTRAMUSCULAR; INTRAVENOUS ONCE
Status: COMPLETED | OUTPATIENT
Start: 2023-08-09 | End: 2023-08-09

## 2023-08-09 RX ORDER — KETOROLAC TROMETHAMINE 30 MG/ML
30 INJECTION, SOLUTION INTRAMUSCULAR; INTRAVENOUS ONCE
Status: COMPLETED | OUTPATIENT
Start: 2023-08-09 | End: 2023-08-09

## 2023-08-09 RX ORDER — MAGNESIUM SULFATE HEPTAHYDRATE 40 MG/ML
2 INJECTION, SOLUTION INTRAVENOUS ONCE
Status: COMPLETED | OUTPATIENT
Start: 2023-08-09 | End: 2023-08-09

## 2023-08-09 RX ORDER — DIPHENHYDRAMINE HYDROCHLORIDE 50 MG/ML
25 INJECTION INTRAMUSCULAR; INTRAVENOUS ONCE
Status: COMPLETED | OUTPATIENT
Start: 2023-08-09 | End: 2023-08-09

## 2023-08-09 RX ORDER — ROPIVACAINE HYDROCHLORIDE 5 MG/ML
15 INJECTION, SOLUTION EPIDURAL; INFILTRATION; PERINEURAL ONCE
Status: COMPLETED | OUTPATIENT
Start: 2023-08-09 | End: 2023-08-09

## 2023-08-09 RX ADMIN — DIPHENHYDRAMINE HYDROCHLORIDE 25 MG: 50 INJECTION, SOLUTION INTRAMUSCULAR; INTRAVENOUS at 12:28

## 2023-08-09 RX ADMIN — MAGNESIUM SULFATE HEPTAHYDRATE 2 G: 40 INJECTION, SOLUTION INTRAVENOUS at 12:33

## 2023-08-09 RX ADMIN — METOCLOPRAMIDE 10 MG: 5 INJECTION, SOLUTION INTRAMUSCULAR; INTRAVENOUS at 12:31

## 2023-08-09 RX ADMIN — KETOROLAC TROMETHAMINE 30 MG: 30 INJECTION, SOLUTION INTRAMUSCULAR; INTRAVENOUS at 12:30

## 2023-08-09 RX ADMIN — ROPIVACAINE HYDROCHLORIDE 15 ML: 5 INJECTION, SOLUTION EPIDURAL; INFILTRATION; PERINEURAL at 12:01

## 2023-08-09 RX ADMIN — SODIUM CHLORIDE 1000 ML: 0.9 INJECTION, SOLUTION INTRAVENOUS at 12:22

## 2023-08-09 NOTE — ED PROVIDER NOTES
History  Chief Complaint   Patient presents with   • Headache     Pt c/o left sided headache for 3 days. Yelitza Arana is a 43year old female presenting for evaluation of 3 days of worsening right-sided headache. She described that she awoke with a headache, it radiates from the right side of her face, jaw, down the right side of her neck. She describes associated photophobia, intermittent blurry vision, intermittent nausea. Denies any focal neurological deficits. She does have chronic paresthesias of the upper extremities for which she has had prior EMG workup, likely cervical radiculopathy. She does describe history of similar prior headaches that last several days, they typically resolve on their own and/or with over-the-counter medications. These headaches seem to occur in the mornings, she has never vomited from a headache but has had nausea. She has not seen neurology for these headaches nor had any prior imaging. She has been taking ibuprofen and Tylenol without any relief. History provided by:  Patient   used: No    Headache  Associated symptoms: no abdominal pain, no back pain, no cough, no ear pain, no eye pain, no fever, no seizures, no sore throat and no vomiting        Prior to Admission Medications   Prescriptions Last Dose Informant Patient Reported? Taking?    Prenatal w/o A Vit-Fe Fum-FA (PRENATA PO)  Self Yes No   Sig: Take by mouth   Patient not taking: Reported on 7/24/2023   acetaminophen (TYLENOL) 325 mg tablet   No No   Sig: Take 2 tablets (650 mg total) by mouth every 6 (six) hours as needed for mild pain or moderate pain   Patient not taking: Reported on 7/24/2023   ibuprofen (MOTRIN) 600 mg tablet   No No   Sig: Take 1 tablet (600 mg total) by mouth every 6 (six) hours as needed for moderate pain or mild pain   Patient not taking: Reported on 7/24/2023   norelgestromin-ethinyl estradiol (ORTHO EVRA) 150-35 MCG/24HR   No No   Sig: Place 1 patch on the skin over 7 days once a week   omeprazole (PriLOSEC) 20 mg delayed release capsule   Yes No   Sig: Take 20 mg by mouth daily as needed PRN   oxyCODONE (Roxicodone) 5 immediate release tablet   No No   Sig: Take 1 tablet (5 mg total) by mouth every 4 (four) hours as needed for moderate pain Max Daily Amount: 30 mg      Facility-Administered Medications: None       Past Medical History:   Diagnosis Date   • GERD (gastroesophageal reflux disease)    • Polycystic ovary syndrome    • Sickle cell trait (HCC)        Past Surgical History:   Procedure Laterality Date   • CHOLECYSTECTOMY     • CO HYSTEROSCOPY BX ENDOMETRIUM&/POLYPC W/WO D&C N/A 2023    Procedure: (D&C) W/ HYSTEROSCOPY;  Surgeon: Trevon Martines MD;  Location: [facility];  Service: Gynecology   • CO LAPAROSCOPY W/RMVL ADNEXAL STRUCTURES Bilateral 2023    Procedure: SALPINGECTOMY, LAP;  Surgeon: Trevon Martines MD;  Location[facility];  Service: Gynecology   • CO TX MISSED  FIRST TRIMESTER SURGICAL N/A 5/15/2023    Procedure: (D&E) SUCTION RETAINED PRODUCTS OF CONCEPTION AFTER DELIVERY;  Surgeon: Trevon Martines MD;  Location: [facility];  Service: Gynecology       Family History   Problem Relation Age of Onset   • Diabetes Mother    • Hypertension Mother    • Kidney disease Mother    • Diabetes Father    • Hypertension Father    • No Known Problems Daughter    • No Known Problems Son    • Diabetes Maternal Grandmother    • Alzheimer's disease Maternal Grandfather    • Liver cancer Paternal Grandmother    • Breast cancer Neg Hx    • Colon cancer Neg Hx    • Ovarian cancer Neg Hx      I have reviewed and agree with the history as documented.     E-Cigarette/Vaping   • E-Cigarette Use Never User      E-Cigarette/Vaping Substances   • Nicotine No    • THC No    • CBD No    • Flavoring No    • Other No    • Unknown No      Social History     Tobacco Use   • Smoking status: Never   • Smokeless tobacco: Never   Vaping Use   • Vaping Use: Never used   Substance Use Topics   • Alcohol use: Not Currently   • Drug use: Never        Review of Systems   Constitutional: Negative for chills and fever. HENT: Negative for ear pain and sore throat. Eyes: Negative for pain and visual disturbance. Respiratory: Negative for cough and shortness of breath. Cardiovascular: Negative for chest pain and palpitations. Gastrointestinal: Negative for abdominal pain and vomiting. Genitourinary: Negative for dysuria and hematuria. Musculoskeletal: Negative for arthralgias and back pain. Skin: Negative for color change and rash. Neurological: Positive for headaches. Negative for seizures and syncope. All other systems reviewed and are negative. Physical Exam  ED Triage Vitals [08/09/23 1115]   Temperature Pulse Respirations Blood Pressure SpO2   97.9 °F (36.6 °C) 84 18 127/78 97 %      Temp Source Heart Rate Source Patient Position - Orthostatic VS BP Location FiO2 (%)   Temporal Monitor Sitting Left arm --      Pain Score       10 - Worst Possible Pain             Orthostatic Vital Signs  Vitals:    08/09/23 1300 08/09/23 1330 08/09/23 1415 08/09/23 1430   BP: 107/64 113/62 109/62 106/62   Pulse: 63 64 63 60   Patient Position - Orthostatic VS: Sitting Lying Sitting Lying       Physical Exam  Vitals and nursing note reviewed. Constitutional:       Appearance: Normal appearance. HENT:      Head: Normocephalic and atraumatic. Comments: Tenderness to palpation over the right occipital nerve     Mouth/Throat:      Mouth: Mucous membranes are moist.      Pharynx: Oropharynx is clear. Eyes:      General: No scleral icterus. Intraocular pressure: Right eye pressure is 15 mmHg. Left eye pressure is 15 mmHg. Measurements were taken using an automated tonometer. Conjunctiva/sclera: Conjunctivae normal.   Cardiovascular:      Rate and Rhythm: Normal rate and regular rhythm. Heart sounds: Normal heart sounds.    Pulmonary:      Effort: Pulmonary effort is normal. No respiratory distress. Breath sounds: Normal breath sounds. Abdominal:      General: Abdomen is flat. There is no distension. Tenderness: There is no abdominal tenderness. Musculoskeletal:         General: Tenderness present. No signs of injury. Cervical back: Neck supple. No rigidity. Comments: Tenderness to palpation over the right cervical paraspinal muscles   Skin:     General: Skin is warm. Coloration: Skin is not jaundiced. Findings: No erythema or rash. Neurological:      General: No focal deficit present. Mental Status: She is alert. Mental status is at baseline.    Psychiatric:         Mood and Affect: Mood normal.         Behavior: Behavior normal.         ED Medications  Medications   ketorolac (TORADOL) injection 30 mg (30 mg Intravenous Given 8/9/23 1230)   metoclopramide (REGLAN) injection 10 mg (10 mg Intravenous Given 8/9/23 1231)   diphenhydrAMINE (BENADRYL) injection 25 mg (25 mg Intravenous Given 8/9/23 1228)   magnesium sulfate 2 g/50 mL IVPB (premix) 2 g (0 g Intravenous Stopped 8/9/23 1343)   sodium chloride 0.9 % bolus 1,000 mL (1,000 mL Intravenous New Bag 8/9/23 1222)   ropivacaine (NAROPIN) 0.5 % injection 15 mL (15 mL Infiltration Given by Other 8/9/23 1201)       Diagnostic Studies  Results Reviewed     Procedure Component Value Units Date/Time    Basic metabolic panel [[id number]] Collected: 08/09/23 1220    Lab Status: Final result Specimen: Blood from Arm, Right Updated: 08/09/23 1242     Sodium 138 mmol/L      Potassium 4.0 mmol/L      Chloride 103 mmol/L      CO2 26 mmol/L      ANION GAP 9 mmol/L      BUN 13 mg/dL      Creatinine 0.76 mg/dL      Glucose 95 mg/dL      Calcium 10.0 mg/dL      eGFR 99 ml/min/1.73sq m     Narrative:      [place] guidelines for Chronic Kidney Disease (CKD):   •  Stage 1 with normal or high GFR (GFR > 90 mL/min/1.73 square meters)  •  Stage 2 Mild CKD (GFR = 60-89 mL/min/1.73 square meters)  •  Stage 3A Moderate CKD (GFR = 45-59 mL/min/1.73 square meters)  •  Stage 3B Moderate CKD (GFR = 30-44 mL/min/1.73 square meters)  •  Stage 4 Severe CKD (GFR = 15-29 mL/min/1.73 square meters)  •  Stage 5 End Stage CKD (GFR <15 mL/min/1.73 square meters)  Note: GFR calculation is accurate only with a steady state creatinine    CBC and differential [[id number]]  (Abnormal) Collected: 08/09/23 1220    Lab Status: Final result Specimen: Blood from Arm, Right Updated: 08/09/23 1228     WBC 7.48 Thousand/uL      RBC 5.59 Million/uL      Hemoglobin 14.7 g/dL      Hematocrit 45.3 %      MCV 81 fL      MCH 26.3 pg      MCHC 32.5 g/dL      RDW 14.6 %      MPV 9.9 fL      Platelets 776 Thousands/uL      nRBC 0 /100 WBCs      Neutrophils Relative 55 %      Immat GRANS % 0 %      Lymphocytes Relative 33 %      Monocytes Relative 8 %      Eosinophils Relative 3 %      Basophils Relative 1 %      Neutrophils Absolute 4.14 Thousands/µL      Immature Grans Absolute 0.03 Thousand/uL      Lymphocytes Absolute 2.44 Thousands/µL      Monocytes Absolute 0.59 Thousand/µL      Eosinophils Absolute 0.22 Thousand/µL      Basophils Absolute 0.06 Thousands/µL     POCT pregnancy, urine [[id number]]  (Normal) Resulted: 08/09/23 1219    Lab Status: Final result Specimen: Urine Updated: 08/09/23 1219     EXT Preg Test, Ur Negative     Control Valid                 CT head wo contrast   Final Result by [address] [place] [address] (08/09 1440)      No acute intracranial abnormality. Resident: Anna Solano, the attending radiologist, have reviewed the images and agree with the final report above.       Workstation performed: [id number]               Procedures  Nerve block    Date/Time: 8/9/2023 11:47 AM    Performed by: Elmo Mckee DO  Authorized by: Elmo Mckee DO    Patient location:  ED  [facility] Protocol:  Procedure performed by: (Dr. Priscilla Wyman)  Consent: Verbal consent obtained. Risks and benefits: risks, benefits and alternatives were discussed  Consent given by: patient  Time out: Immediately prior to procedure a "time out" was called to verify the correct patient, procedure, equipment, support staff and site/side marked as required. Timeout called at: 8/9/2023 11:48 AM.  Patient understanding: patient states understanding of the procedure being performed  Patient consent: the patient's understanding of the procedure matches consent given  Procedure consent: procedure consent matches procedure scheduled  Relevant documents: relevant documents present and verified  Test results: test results available and properly labeled  Site marked: the operative site was marked  Required items: required blood products, implants, devices, and special equipment available  Patient identity confirmed: verbally with patient and arm band      Indications:     Indications:  Pain relief  Location:     Body area:  Head    Head nerve:  Occipital    Laterality:  Right  Pre-procedure details:     Skin preparation:  2% chlorhexidine  Skin anesthesia (see MAR for exact dosages):     Skin anesthesia method:  Local infiltration    Local anesthetic:  Ropivacaine 0.5%  Procedure details (see MAR for exact dosages): Block needle gauge:  30 G    Anesthetic injected:  Ropivacaine 0.5%    Steroid injected:  None    Additive injected:  None    Injection procedure:  Anatomic landmarks identified, anatomic landmarks palpated, incremental injection and introduced needle  Post-procedure details:     Dressing:  None    Outcome:  Pain improved    Patient tolerance of procedure:   Tolerated well, no immediate complications  Comments:      Pain did improve slightly after occipital nerve injection, however she still has significant pain on the right side of her face and behind her eye, pain around her jaw did improve          ED Course  ED Course as of 08/09/23 1543   Wed Aug 09, 2023   1217 Patient's headache only mildly relieved after occipital nerve block, will proceed with migraine cocktail   1355 Patient's headache has resolved after migraine cocktail, will follow-up on CT scan                             SBIRT 20yo+    Flowsheet Row Most Recent Value   Initial Alcohol Screen: US AUDIT-C     1. How often do you have a drink containing alcohol? 0 Filed at: 08/09/2023 1115   2. How many drinks containing alcohol do you have on a typical day you are drinking? 0 Filed at: 08/09/2023 1115   3a. Male UNDER 65: How often do you have five or more drinks on one occasion? 0 Filed at: 08/09/2023 1115   3b. FEMALE Any Age, or MALE 65+: How often do you have 4 or more drinks on one occassion? 0 Filed at: 08/09/2023 1115   Audit-C Score 0 Filed at: 08/09/2023 8080 Milo Shoemaker is a 43year old female presenting for evaluation of 3 days of worsening right-sided headache. She describes associated photophobia, intermittent nausea and blurry vision. No focal numbness or weakness. Pain radiates from the right temple over the right eye into the right jaw and right neck. She has had prior similar headaches that typically resolve with over-the-counter medications. She has never follow-up with neurology nor head imaging. On arrival, the patient appeared to be in obvious discomfort, she was shielding her eyes from the light and appeared to have photophobia. Neurological exam was normal.  She did have tenderness to palpation of her right cervical paraspinal muscles, tenderness over the right occipital nerve. Symptoms are concerning for migraine headache, tension headache. Less likely does she have acute intracranial pathology such as tumor. Low suspicion for trigeminal neuralgia, temporal arteritis, glaucoma. Initially attempted to treat the patient with occipital nerve block, she did have some relief, but continued with headache.   Then proceeded with migraine cocktail after which her headache resolved. Patient's blood work and CT of her head was unremarkable. Ocular pressures were normal.  Suspect migraine headaches, I recommended that she follow-up with neurology and provided with a referral, give strict return precautions, she was understanding and agreeable to plan. Acute headache: acute illness or injury  Amount and/or Complexity of Data Reviewed  Labs: ordered. Radiology: ordered. Details: CT of her head which was unremarkable      Risk  Prescription drug management. Disposition  Final diagnoses:   Acute headache - Suspect migraine headaches     Time reflects when diagnosis was documented in both MDM as applicable and the Disposition within this note     Time User Action Codes Description Comment    8/9/2023  2:43 PM Duncan Goins Add [R51.9] Acute headache     8/9/2023  2:43 PM Duncan Goins Modify [R51.9] Acute headache Suspect migraine headaches      ED Disposition     ED Disposition   Discharge    Condition   Stable    Date/Time   Wed Aug 9, 2023  2:43 PM    [address] discharge to home/self care. Follow-up Information     Follow up With Specialties Details Why Contact Info Additional [address] [facility] Schedule an appointment as soon as possible for a visit   [address] [id number] [address] [facility], [address] [facility] [address], [facility], [address] [place] [address],[address], [address]          Patient's Medications   Discharge Prescriptions    No medications on file         PDMP Review       Value Time User    PDMP Reviewed  Yes 7/24/2023  5:16 PM Torsten Pratt MD           ED Provider  Attending physically available and evaluated Gloria No. I managed the patient along with the ED Attending.     Electronically Signed by         Ramiro Pedro,   08/09/23 8728  [facility], DO  08/09/23 1600

## 2023-08-09 NOTE — DISCHARGE INSTRUCTIONS
Schedule a follow-up appoint with the neurologist.  Take ibuprofen or Tylenol as needed for headaches. Return to the emergency department should develop sudden onset severe headache, neurological symptoms such as vision changes, numbness or weakness of your arms or legs, confusion, or any other concerning signs or symptoms.

## 2023-08-11 ENCOUNTER — OFFICE VISIT (OUTPATIENT)
Dept: OBGYN CLINIC | Facility: MEDICAL CENTER | Age: 38
End: 2023-08-11

## 2023-08-11 VITALS
HEIGHT: 66 IN | BODY MASS INDEX: 33.11 KG/M2 | WEIGHT: 206 LBS | SYSTOLIC BLOOD PRESSURE: 100 MMHG | DIASTOLIC BLOOD PRESSURE: 68 MMHG

## 2023-08-11 DIAGNOSIS — Z09 POSTOPERATIVE EXAMINATION: Primary | ICD-10-CM

## 2023-08-11 PROBLEM — O35.BXX0 FETAL CARDIAC ANOMALY COMPLICATING PREGNANCY, ANTEPARTUM: Status: RESOLVED | Noted: 2023-03-21 | Resolved: 2023-08-11

## 2023-08-11 PROCEDURE — 99024 POSTOP FOLLOW-UP VISIT: CPT | Performed by: OBSTETRICS & GYNECOLOGY

## 2023-08-11 NOTE — PROGRESS NOTES
Mario Valladares is a 43 y.o. female who presents to the clinic 2 weeks status post laparoscopic BS, d&c for requested sterilization and thickened endometrium. Eating a regular diet without difficulty. Bowel movements are normal. The patient is not having any pain. The following portions of the patient's history were reviewed and updated as appropriate: allergies, current medications, past family history, past medical history, past social history, past surgical history and problem list.    Review of Systems  Pertinent items are noted in HPI. Objective     /68 (BP Location: Left arm)   Ht 5' 6" (1.676 m)   Wt 93.4 kg (206 lb)   LMP  (LMP Unknown)   BMI 33.25 kg/m²   General:  alert and oriented, in no acute distress   Abdomen: soft, bowel sounds active, non-tender   Incision:   healing well, no drainage, no erythema, no hernia, no seroma, no swelling, well approximated, no dehiscence, incision well approximated         Assessment      Doing well postoperatively. Operative findings again reviewed. Pathology report discussed. Plan     1. Continue any current medications. 2. Wound care discussed. 3. Activity restrictions: none  4. Anticipated return to work: not applicable.

## 2023-09-15 ENCOUNTER — TELEPHONE (OUTPATIENT)
Dept: NEUROLOGY | Facility: CLINIC | Age: 38
End: 2023-09-15

## 2023-09-15 NOTE — TELEPHONE ENCOUNTER
Call returned to patient, [phone]. Patient reports migraines have been on and off but more recently starting to last longer and nothing helps to relieve them. Last month went to ED for migraine cocktail. Also had nerve injections to back of neck area which improved for some time and then headaches. Headache pain generally to right side, eye, then down into back of head including jaw and ear. Associated symptoms of nausea, light and sound sensitivity. Sometimes helps when applying a bit of pressure but typically pain will last several days. Also experiencing brain fog, patient described as as "feeling like she was flying on clouds". Any recommendations to break current migraine cycle?     [id number]

## 2023-09-17 ENCOUNTER — TELEPHONE (OUTPATIENT)
Dept: OTHER | Facility: HOSPITAL | Age: 38
End: 2023-09-17

## 2023-09-17 NOTE — TELEPHONE ENCOUNTER
This writer attempted to reach patient x2 without success, each attempt resulted in a direct connection to voicemail. Patient's voicemail had no identification that number belongs to patient therefore message was not left. Writer will attempt to reach patient again in near future.

## 2023-09-18 ENCOUNTER — TELEPHONE (OUTPATIENT)
Dept: OTHER | Facility: HOSPITAL | Age: 38
End: 2023-09-18

## 2023-09-18 ENCOUNTER — OFFICE VISIT (OUTPATIENT)
Dept: FAMILY MEDICINE CLINIC | Facility: CLINIC | Age: 38
End: 2023-09-18
Payer: COMMERCIAL

## 2023-09-18 VITALS
TEMPERATURE: 98.5 F | BODY MASS INDEX: 34.17 KG/M2 | DIASTOLIC BLOOD PRESSURE: 78 MMHG | SYSTOLIC BLOOD PRESSURE: 114 MMHG | WEIGHT: 212.6 LBS | HEIGHT: 66 IN | HEART RATE: 96 BPM | OXYGEN SATURATION: 98 %

## 2023-09-18 DIAGNOSIS — Z23 NEED FOR INFLUENZA VACCINATION: ICD-10-CM

## 2023-09-18 DIAGNOSIS — K29.00 ACUTE GASTRITIS WITHOUT HEMORRHAGE, UNSPECIFIED GASTRITIS TYPE: Primary | ICD-10-CM

## 2023-09-18 DIAGNOSIS — G43.909 MIGRAINE: Primary | ICD-10-CM

## 2023-09-18 PROCEDURE — 90471 IMMUNIZATION ADMIN: CPT | Performed by: PHYSICIAN ASSISTANT

## 2023-09-18 PROCEDURE — 90686 IIV4 VACC NO PRSV 0.5 ML IM: CPT | Performed by: PHYSICIAN ASSISTANT

## 2023-09-18 PROCEDURE — 99214 OFFICE O/P EST MOD 30 MIN: CPT | Performed by: PHYSICIAN ASSISTANT

## 2023-09-18 RX ORDER — DEXAMETHASONE 2 MG/1
2 TABLET ORAL
Qty: 5 TABLET | Refills: 0 | Status: SHIPPED | OUTPATIENT
Start: 2023-09-18 | End: 2023-09-23

## 2023-09-18 RX ORDER — OMEPRAZOLE 20 MG/1
20 CAPSULE, DELAYED RELEASE ORAL DAILY
Qty: 30 CAPSULE | Refills: 1 | Status: SHIPPED | OUTPATIENT
Start: 2023-09-18

## 2023-09-18 RX ORDER — DIVALPROEX SODIUM 250 MG/1
500 TABLET, EXTENDED RELEASE ORAL DAILY
Qty: 5 TABLET | Refills: 0 | Status: SHIPPED | OUTPATIENT
Start: 2023-09-18

## 2023-09-18 RX ORDER — SUCRALFATE 1 G/1
1 TABLET ORAL 4 TIMES DAILY
Qty: 120 TABLET | Refills: 0 | Status: SHIPPED | OUTPATIENT
Start: 2023-09-18

## 2023-09-18 NOTE — PROGRESS NOTES
Name: Ria Horan      : 1985      MRN: [id number]  Encounter Provider: Cristofer Yadav PA-C  Encounter Date: 2023   Encounter department: [address]     1. Acute gastritis without hemorrhage, unspecified gastritis type  Assessment & Plan:  Pt to use carafate and omeprazole daily. RTO 2 weeks. Eat bland diet. If still symptomatic, will refer to GI for EGD at that time. Orders:  -     omeprazole (PriLOSEC) 20 mg delayed release capsule; Take 1 capsule (20 mg total) by mouth daily PRN  -     sucralfate (CARAFATE) 1 g tablet; Take 1 tablet (1 g total) by mouth 4 (four) times a day    2. Need for influenza vaccination  -     influenza vaccine, quadrivalent, 0.5 mL, preservative-free, for adult and pediatric patients 6 mos+ (AFLURIA, FLUARIX, FLULAVAL, FLUZONE)      BMI Counseling: Body mass index is 34.31 kg/m². The BMI is above normal. Nutrition recommendations include decreasing portion sizes, encouraging healthy choices of fruits and vegetables, decreasing fast food intake, consuming healthier snacks, limiting drinks that contain sugar, moderation in carbohydrate intake, increasing intake of lean protein, reducing intake of saturated and trans fat and reducing intake of cholesterol. Exercise recommendations include moderate physical activity 150 minutes/week. Rationale for BMI follow-up plan is due to patient being overweight or obese. Depression Screening and Follow-up Plan: Patient was screened for depression during today's encounter. They screened negative with a PHQ-2 score of 0. Subjective      Kim Saldana is here today complaining of abdominal pain/diarrhea after eating/drinking. This started 1 week ago. Pt states occurs no matter what she eats or drinks, it happens if she only drinks water. Denies N/V, denies heartburn, denies fevers/chills. Denies seeing any blood in stool.     Review of Systems   Constitutional: Negative for activity change, appetite change, chills, diaphoresis, fatigue, fever and unexpected weight change. HENT: Negative for congestion, ear pain, postnasal drip, rhinorrhea, sinus pressure, sinus pain, sneezing, sore throat, tinnitus and voice change. Eyes: Negative for pain, redness and visual disturbance. Respiratory: Negative for cough, chest tightness, shortness of breath and wheezing. Cardiovascular: Negative for chest pain, palpitations and leg swelling. Gastrointestinal: Positive for abdominal pain and diarrhea. Negative for blood in stool, constipation, nausea and vomiting. Genitourinary: Negative for difficulty urinating, dysuria, frequency, hematuria and urgency. Musculoskeletal: Negative for arthralgias, back pain, gait problem, joint swelling, myalgias, neck pain and neck stiffness. Skin: Negative for color change, pallor, rash and wound. Neurological: Negative for dizziness, tremors, weakness, light-headedness and headaches. Psychiatric/Behavioral: Negative for dysphoric mood, self-injury, sleep disturbance and suicidal ideas. The patient is not nervous/anxious. Current Outpatient Medications on File Prior to Visit   Medication Sig   • [DISCONTINUED] omeprazole (PriLOSEC) 20 mg delayed release capsule Take 20 mg by mouth daily as needed PRN       Objective     /78   Pulse 96   Temp 98.5 °F (36.9 °C)   Ht 5' 6" (1.676 m)   Wt 96.4 kg (212 lb 9.6 oz)   SpO2 98%   BMI 34.31 kg/m²     Physical Exam  Vitals reviewed. Constitutional:       General: She is not in acute distress. Appearance: She is well-developed. She is not diaphoretic. HENT:      Head: Normocephalic and atraumatic. Right Ear: Hearing, tympanic membrane, ear canal and external ear normal.      Left Ear: Hearing, tympanic membrane, ear canal and external ear normal.      Mouth/Throat:      Pharynx: Uvula midline. No oropharyngeal exudate. Eyes:      General: No scleral icterus. Right eye: No discharge. Left eye: No discharge. Conjunctiva/sclera: Conjunctivae normal.   Neck:      Thyroid: No thyromegaly. Vascular: No carotid bruit. Cardiovascular:      Rate and Rhythm: Normal rate and regular rhythm. Heart sounds: Normal heart sounds. No murmur heard. Pulmonary:      Effort: Pulmonary effort is normal. No respiratory distress. Breath sounds: Normal breath sounds. No wheezing. Abdominal:      General: Bowel sounds are normal. There is no distension. Palpations: Abdomen is soft. There is no mass. Tenderness: There is no abdominal tenderness. There is no guarding or rebound. Comments: Pain is not reproducible with exam. Currently is asymptomatic. Last ate a bagel at 8 AM today. Musculoskeletal:         General: No tenderness. Normal range of motion. Cervical back: Neck supple. Lymphadenopathy:      Cervical: No cervical adenopathy. Skin:     General: Skin is warm and dry. Findings: No erythema or rash. Neurological:      Mental Status: She is alert and oriented to person, place, and time. Psychiatric:         Behavior: Behavior normal.         Thought Content:  Thought content normal.         Judgment: Judgment normal.       Kaden Fermin PA-C

## 2023-09-18 NOTE — TELEPHONE ENCOUNTER
Patient is a 54-year-old female that follows with the resident neurology clinic for bilateral upper extremity paresthesias. The patient reports that she has been having headaches, which even necessitated admission to the ED for a migraine cocktail. After calling the patient she reports that she has been having headaches on and off, the patient reports that they can last for couple of days, the patient reported that she attempted acetaminophen and Aleve without effect. The patient states that generally her headaches are right-sided, involves the neck, ear, jaw, and describes retro-orbital pressure. The patient reports photophobia and nausea. Generally when the headaches are "bad" the patient reports a pain scale of 8/10. The headache is described as sharp, throbbing, reports that sometimes applying pressure on the side of her head will allow for some relief. The patient reports that generally she will have 2 headache episodes per month, reports that overall she will have 10-11 headache days per month. The patient denied vertigo, loss of vision, new paresthesias, new weakness, syncopal events, difficulty swallowing, difficulty with speech. The patient reports that even if she treats her headaches they will only resolve after 3 to 4 days. The patient denied both a personal and family history of stroke, aneurysm, seizures, or myocardial infarction. The patient's current headache is described as being located on the right-hand side, affecting her eye where the patient describes retro-orbital pressure, the right forehead, right temple, and right neck (posterior). Current pain scale is 5/10. Duration of the headache patient describe that the headache started in the a.m. of September 17. The patient reports that as of the phone call today that the headache feels less strong and less sharp.   The patient attempted 2000 mg of acetaminophen yesterday, September 17, and had a total of 1200 mg of Motrin (600 mg in a.m., and 600 mg at 1300). The patient reports having had 5/6 bottles of water (16.9 ounces). The patient reported utilizing Motrin for 1 day only. The patient is currently not pregnant, and reports not planning currently to become pregnant either. Last normal menstrual cycle was the first week of September, patient described no abnormalities. Patient has not in the past tried valproic acid or dexamethasone for migraine . Plan:  - Below plan discussed with patient and patient was in agreement to initiate therapy and continue with follow-up plan. - Initiate valproic acid 500 mg p.o. daily for 5 days. - Initiate dexamethasone 2 mg p.o. daily for 5 days  - Patient to call if any new symptoms or worsening symptoms emerge.  - Patient to call 911 or go to the emergency room if immediate medical attention is required  - Patient to call the neurology office tomorrow, 2023, for an earlier appointment for for migraine evaluation.  - All questions and concerns addressed to the best of ability, patient appreciative of call.

## 2023-09-18 NOTE — ASSESSMENT & PLAN NOTE
Pt to use carafate and omeprazole daily. RTO 2 weeks. Eat bland diet. If still symptomatic, will refer to GI for EGD at that time.

## 2023-10-02 ENCOUNTER — OFFICE VISIT (OUTPATIENT)
Dept: FAMILY MEDICINE CLINIC | Facility: CLINIC | Age: 38
End: 2023-10-02
Payer: COMMERCIAL

## 2023-10-02 VITALS
BODY MASS INDEX: 34.04 KG/M2 | DIASTOLIC BLOOD PRESSURE: 70 MMHG | HEIGHT: 66 IN | WEIGHT: 211.8 LBS | HEART RATE: 72 BPM | TEMPERATURE: 98.2 F | SYSTOLIC BLOOD PRESSURE: 100 MMHG | OXYGEN SATURATION: 98 %

## 2023-10-02 DIAGNOSIS — K29.00 ACUTE GASTRITIS WITHOUT HEMORRHAGE, UNSPECIFIED GASTRITIS TYPE: ICD-10-CM

## 2023-10-02 PROBLEM — D57.3 SICKLE CELL TRAIT IN MOTHER AFFECTING PREGNANCY (HCC): Status: RESOLVED | Noted: 2022-10-31 | Resolved: 2023-10-02

## 2023-10-02 PROBLEM — Z98.890 STATUS POST DILATION AND CURETTAGE: Status: RESOLVED | Noted: 2023-07-31 | Resolved: 2023-10-02

## 2023-10-02 PROBLEM — Z71.85 VACCINE COUNSELING: Status: RESOLVED | Noted: 2022-11-01 | Resolved: 2023-10-02

## 2023-10-02 PROBLEM — Z90.79 STATUS POST BILATERAL SALPINGECTOMY: Status: RESOLVED | Noted: 2023-07-31 | Resolved: 2023-10-02

## 2023-10-02 PROBLEM — Z30.2 REQUEST FOR STERILIZATION: Status: RESOLVED | Noted: 2023-07-31 | Resolved: 2023-10-02

## 2023-10-02 PROBLEM — O99.019 SICKLE CELL TRAIT IN MOTHER AFFECTING PREGNANCY (HCC): Status: RESOLVED | Noted: 2022-10-31 | Resolved: 2023-10-02

## 2023-10-02 PROCEDURE — 99213 OFFICE O/P EST LOW 20 MIN: CPT | Performed by: PHYSICIAN ASSISTANT

## 2023-10-02 RX ORDER — SUCRALFATE 1 G/1
1 TABLET ORAL 4 TIMES DAILY
Qty: 120 TABLET | Refills: 0 | Status: SHIPPED | OUTPATIENT
Start: 2023-10-02

## 2023-10-02 RX ORDER — OMEPRAZOLE 20 MG/1
20 CAPSULE, DELAYED RELEASE ORAL DAILY
Qty: 30 CAPSULE | Refills: 1 | Status: SHIPPED | OUTPATIENT
Start: 2023-10-02

## 2023-10-02 NOTE — ASSESSMENT & PLAN NOTE
Pt to continue taking carafate and omeprazole. Referral placed to GI for further evaluation/treatment.

## 2023-10-02 NOTE — PROGRESS NOTES
Name: Jenni Judge      : 1985      MRN: [id number]  Encounter Provider: Dyan Rome PA-C  Encounter Date: 10/2/2023   Encounter department: [address]     1. Acute gastritis without hemorrhage, unspecified gastritis type  Assessment & Plan:  Pt to continue taking carafate and omeprazole. Referral placed to GI for further evaluation/treatment. Orders:  -     omeprazole (PriLOSEC) 20 mg delayed release capsule; Take 1 capsule (20 mg total) by mouth daily PRN  -     sucralfate (CARAFATE) 1 g tablet; Take 1 tablet (1 g total) by mouth 4 (four) times a day  -     Ambulatory Referral to Gastroenterology; Future        Depression Screening and Follow-up Plan: Patient was screened for depression during today's encounter. They screened negative with a PHQ-2 score of 0. Mandy Do is here today for 2 week follow up. States is at least 50% better since taking carafate and omeprazole. She is back to eating a mostly "normal" diet, notes that she does not have instant pain after eating, however still is having some diarrhea. She states it seems like it takes longer after eating for symptoms to develop. She no longer is symptomatic from drinking water. Review of Systems   Constitutional: Negative for activity change, appetite change, chills, diaphoresis, fatigue, fever and unexpected weight change. HENT: Negative for congestion, ear pain, postnasal drip, rhinorrhea, sinus pressure, sinus pain, sneezing, sore throat, tinnitus and voice change. Eyes: Negative for pain, redness and visual disturbance. Respiratory: Negative for cough, chest tightness, shortness of breath and wheezing. Cardiovascular: Negative for chest pain, palpitations and leg swelling. Gastrointestinal: Positive for abdominal pain and diarrhea. Negative for blood in stool, constipation, nausea and vomiting.    Genitourinary: Negative for difficulty urinating, dysuria, frequency, hematuria and urgency. Musculoskeletal: Negative for arthralgias, back pain, gait problem, joint swelling, myalgias, neck pain and neck stiffness. Skin: Negative for color change, pallor, rash and wound. Neurological: Negative for dizziness, tremors, weakness, light-headedness and headaches. Psychiatric/Behavioral: Negative for dysphoric mood, self-injury, sleep disturbance and suicidal ideas. The patient is not nervous/anxious. Current Outpatient Medications on File Prior to Visit   Medication Sig   • [DISCONTINUED] omeprazole (PriLOSEC) 20 mg delayed release capsule Take 1 capsule (20 mg total) by mouth daily PRN   • [DISCONTINUED] sucralfate (CARAFATE) 1 g tablet Take 1 tablet (1 g total) by mouth 4 (four) times a day   • [DISCONTINUED] divalproex sodium (Depakote ER) 250 mg 24 hr tablet Take 2 tablets (500 mg total) by mouth daily (Patient not taking: Reported on 10/2/2023)       Objective     /70   Pulse 72   Temp 98.2 °F (36.8 °C)   Ht 5' 6" (1.676 m)   Wt 96.1 kg (211 lb 12.8 oz)   SpO2 98%   BMI 34.19 kg/m²     Physical Exam  Vitals reviewed. Constitutional:       General: She is not in acute distress. Appearance: She is well-developed. She is not diaphoretic. HENT:      Head: Normocephalic and atraumatic. Right Ear: Hearing, tympanic membrane, ear canal and external ear normal.      Left Ear: Hearing, tympanic membrane, ear canal and external ear normal.      Mouth/Throat:      Pharynx: Uvula midline. No oropharyngeal exudate. Eyes:      General: No scleral icterus. Right eye: No discharge. Left eye: No discharge. Conjunctiva/sclera: Conjunctivae normal.   Neck:      Thyroid: No thyromegaly. Vascular: No carotid bruit. Cardiovascular:      Rate and Rhythm: Normal rate and regular rhythm. Heart sounds: Normal heart sounds. No murmur heard. Pulmonary:      Effort: Pulmonary effort is normal. No respiratory distress. Breath sounds: Normal breath sounds. No wheezing. Abdominal:      General: Bowel sounds are normal. There is no distension. Palpations: Abdomen is soft. There is no mass. Tenderness: There is no abdominal tenderness. There is no guarding or rebound. Musculoskeletal:         General: No tenderness. Normal range of motion. Cervical back: Neck supple. Lymphadenopathy:      Cervical: No cervical adenopathy. Skin:     General: Skin is warm and dry. Findings: No erythema or rash. Neurological:      Mental Status: She is alert and oriented to person, place, and time. Psychiatric:         Behavior: Behavior normal.         Thought Content:  Thought content normal.         Judgment: Judgment normal.       Papa Fonseca PA-C

## 2023-10-03 ENCOUNTER — TELEPHONE (OUTPATIENT)
Dept: GASTROENTEROLOGY | Facility: CLINIC | Age: 38
End: 2023-10-03

## 2023-11-11 ENCOUNTER — HOSPITAL ENCOUNTER (EMERGENCY)
Facility: HOSPITAL | Age: 38
Discharge: HOME | End: 2023-11-11
Attending: STUDENT IN AN ORGANIZED HEALTH CARE EDUCATION/TRAINING PROGRAM | Admitting: STUDENT IN AN ORGANIZED HEALTH CARE EDUCATION/TRAINING PROGRAM
Payer: COMMERCIAL

## 2023-11-11 VITALS
HEIGHT: 66 IN | WEIGHT: 211 LBS | OXYGEN SATURATION: 96 % | TEMPERATURE: 97.3 F | SYSTOLIC BLOOD PRESSURE: 135 MMHG | RESPIRATION RATE: 16 BRPM | DIASTOLIC BLOOD PRESSURE: 85 MMHG | BODY MASS INDEX: 33.91 KG/M2 | HEART RATE: 86 BPM

## 2023-11-11 DIAGNOSIS — N39.0 UTI (URINARY TRACT INFECTION): Primary | ICD-10-CM

## 2023-11-11 LAB
BACTERIA UR QL AUTO: ABNORMAL /HPF
BILIRUB UR QL STRIP: NEGATIVE
CLARITY UR: ABNORMAL
COLOR UR: ABNORMAL
GLUCOSE UR STRIP-MCNC: NEGATIVE MG/DL
HGB UR QL STRIP.AUTO: ABNORMAL
KETONES UR STRIP-MCNC: NEGATIVE MG/DL
LEUKOCYTE ESTERASE UR QL STRIP: ABNORMAL
NITRITE UR QL STRIP: POSITIVE
NON-SQ EPI CELLS URNS QL MICRO: ABNORMAL /HPF
PH UR STRIP.AUTO: 5.5 [PH]
PROT UR STRIP-MCNC: ABNORMAL MG/DL
RBC #/AREA URNS AUTO: ABNORMAL /HPF
SP GR UR STRIP.AUTO: 1.02 (ref 1–1.03)
UROBILINOGEN UR QL STRIP.AUTO: 1 E.U./DL
WBC #/AREA URNS AUTO: ABNORMAL /HPF

## 2023-11-11 PROCEDURE — 99284 EMERGENCY DEPT VISIT MOD MDM: CPT | Performed by: STUDENT IN AN ORGANIZED HEALTH CARE EDUCATION/TRAINING PROGRAM

## 2023-11-11 PROCEDURE — 99283 EMERGENCY DEPT VISIT LOW MDM: CPT

## 2023-11-11 PROCEDURE — 87086 URINE CULTURE/COLONY COUNT: CPT | Performed by: STUDENT IN AN ORGANIZED HEALTH CARE EDUCATION/TRAINING PROGRAM

## 2023-11-11 PROCEDURE — 81001 URINALYSIS AUTO W/SCOPE: CPT | Performed by: STUDENT IN AN ORGANIZED HEALTH CARE EDUCATION/TRAINING PROGRAM

## 2023-11-11 RX ORDER — CEPHALEXIN 500 MG/1
500 CAPSULE ORAL EVERY 6 HOURS SCHEDULED
Qty: 20 CAPSULE | Refills: 0 | Status: SHIPPED | OUTPATIENT
Start: 2023-11-11 | End: 2023-11-16

## 2023-11-11 NOTE — ED PROVIDER NOTES
History  Chief Complaint   Patient presents with    Possible UTI     Patient reports urinary frequency and lower abdominal pain. States prone to uti and feels the same. HPI is a pleasant 69-year-old female presents to the emergency department with approximately 4 days of UTI-like symptoms patient endorses hesitancy, dysuria, suprapubic pain. She reports longstanding history of recurrent UTIs and states that her symptoms today are consistent with previous. States she is not pregnant and she recently had bilateral salpingectomy. She denies any fevers chills nausea vomiting. She denies any flank pain. She denies any blood or foul odor to the urine. She has not taken any OTC medications. Prior to Admission Medications   Prescriptions Last Dose Informant Patient Reported?  Taking?   omeprazole (PriLOSEC) 20 mg delayed release capsule   No No   Sig: Take 1 capsule (20 mg total) by mouth daily PRN   sucralfate (CARAFATE) 1 g tablet   No No   Sig: Take 1 tablet (1 g total) by mouth 4 (four) times a day      Facility-Administered Medications: None       Past Medical History:   Diagnosis Date    GERD (gastroesophageal reflux disease)     Polycystic ovary syndrome     Sickle cell trait (HCC)        Past Surgical History:   Procedure Laterality Date    CHOLECYSTECTOMY      [place] HYSTEROSCOPY BX ENDOMETRIUM&/POLYPC W/WO D&C N/A 2023    Procedure: (D&C) W/ HYSTEROSCOPY;  Surgeon: Lorena German MD;  Location: [facility];  Service: Gynecology    [place] LAPAROSCOPY W/RMVL ADNEXAL STRUCTURES Bilateral 2023    Procedure: SALPINGECTOMY, LAP;  Surgeon: Lorena German MD;  Location: [facility];  Service: Gynecology    [place] TX MISSED  FIRST TRIMESTER SURGICAL N/A 5/15/2023    Procedure: (D&E) SUCTION RETAINED PRODUCTS OF CONCEPTION AFTER DELIVERY;  Surgeon: Lorena German MD;  Location: [facility];  Service: Gynecology       Family History   Problem Relation Age of Onset    Diabetes Mother     Hypertension Mother Kidney disease Mother     Diabetes Father     Hypertension Father     No Known Problems Daughter     No Known Problems Son     Diabetes Maternal Grandmother     Alzheimer's disease Maternal Grandfather     Liver cancer Paternal Grandmother     Breast cancer Neg Hx     Colon cancer Neg Hx     Ovarian cancer Neg Hx      I have reviewed and agree with the history as documented. E-Cigarette/Vaping    E-Cigarette Use Never User      E-Cigarette/Vaping Substances    Nicotine No     THC No     CBD No     Flavoring No     Other No     Unknown No      Social History     Tobacco Use    Smoking status: Never    Smokeless tobacco: Never   Vaping Use    Vaping Use: Never used   Substance Use Topics    Alcohol use: Not Currently    Drug use: Never       Review of Systems   Constitutional:  Negative for activity change, appetite change, chills, fatigue and fever. HENT:  Negative for congestion, dental problem, drooling, ear discharge, ear pain, facial swelling, postnasal drip, rhinorrhea and sinus pain. Eyes:  Negative for photophobia, pain, discharge and itching. Respiratory:  Negative for apnea, cough, chest tightness and shortness of breath. Cardiovascular:  Negative for chest pain and leg swelling. Gastrointestinal:  Negative for abdominal distention, abdominal pain, anal bleeding, constipation, diarrhea and nausea. Endocrine: Negative for cold intolerance, heat intolerance and polydipsia. Genitourinary:  Positive for dyspareunia and dysuria. Negative for difficulty urinating. Musculoskeletal:  Negative for arthralgias, gait problem, joint swelling and myalgias. Skin:  Negative for color change and pallor. Allergic/Immunologic: Negative for immunocompromised state. Neurological:  Negative for dizziness, seizures, facial asymmetry, weakness, light-headedness, numbness and headaches.    Psychiatric/Behavioral:  Negative for agitation, behavioral problems, confusion, decreased concentration and dysphoric mood. All other systems reviewed and are negative. Physical Exam  Physical Exam  Vitals and nursing note reviewed. Constitutional:       General: She is not in acute distress. Appearance: She is well-developed. HENT:      Head: Normocephalic and atraumatic. Eyes:      Conjunctiva/sclera: Conjunctivae normal.      Pupils: Pupils are equal, round, and reactive to light. Cardiovascular:      Rate and Rhythm: Normal rate and regular rhythm. Heart sounds: Normal heart sounds. No murmur heard. No friction rub. Pulmonary:      Effort: Pulmonary effort is normal.      Breath sounds: Normal breath sounds. Abdominal:      General: Bowel sounds are normal.      Palpations: Abdomen is soft. Comments: Negative CVA tenderness, mild suprapubic tenderness. Musculoskeletal:         General: Normal range of motion. Cervical back: Normal range of motion and neck supple. Skin:     General: Skin is warm. Capillary Refill: Capillary refill takes less than 2 seconds. Neurological:      Mental Status: She is alert and oriented to person, place, and time. Motor: No abnormal muscle tone. Coordination: Coordination normal.   Psychiatric:         Behavior: Behavior normal.         Thought Content:  Thought content normal.         Vital Signs  ED Triage Vitals [11/11/23 0816]   Temperature Pulse Respirations Blood Pressure SpO2   (!) 97.3 °F (36.3 °C) 86 16 135/85 96 %      Temp Source Heart Rate Source Patient Position - Orthostatic VS BP Location FiO2 (%)   Temporal Monitor Sitting Right arm --      Pain Score       8           Vitals:    11/11/23 0816   BP: 135/85   Pulse: 86   Patient Position - Orthostatic VS: Sitting         Visual Acuity      ED Medications  Medications - No data to display    Diagnostic Studies  Results Reviewed       Procedure Component Value Units Date/Time    Urine Microscopic [[id number]]  (Abnormal) Collected: 11/11/23 0821    Lab Status: Final result Specimen: Urine, Other Updated: 11/11/23 0837     RBC, UA 0-1 /hpf      WBC, UA 10-20 /hpf      Epithelial Cells Moderate /hpf      Bacteria, UA Moderate /hpf     Urine culture [[id number]] Collected: 11/11/23 0821    Lab Status: In process Specimen: Urine, Other Updated: 11/11/23 0837    UA w Reflex to Microscopic w Reflex to Culture [[id number]]  (Abnormal) Collected: 11/11/23 0821    Lab Status: Final result Specimen: Urine, Other Updated: 11/11/23 0837     Color, UA Dk Yellow     Clarity, UA Slightly Cloudy     Specific Gravity, UA 1.020     pH, UA 5.5     Leukocytes, UA Small     Nitrite, UA Positive     Protein, UA Trace mg/dl      Glucose, UA Negative mg/dl      Ketones, UA Negative mg/dl      Urobilinogen, UA 1.0 E.U./dl      Bilirubin, UA Negative     Occult Blood, UA Trace-Intact                   No orders to display              Procedures  Procedures         ED Course  ED Course as of 11/11/23 0837   Sat Nov 11, 2023 0837 Leukocytes, UA(!): Small   5525 Nitrite, UA(!): Positive                               SBIRT 20yo+      Flowsheet Row Most Recent Value   Initial Alcohol Screen: US AUDIT-C     1. How often do you have a drink containing alcohol? 0 Filed at: 11/11/2023 0817   2. How many drinks containing alcohol do you have on a typical day you are drinking? 0 Filed at: 11/11/2023 0817   3a. Male UNDER 65: How often do you have five or more drinks on one occasion? 0 Filed at: 11/11/2023 0817   3b. FEMALE Any Age, or MALE 65+: How often do you have 4 or more drinks on one occassion? 0 Filed at: 11/11/2023 0817   Audit-C Score 0 Filed at: 11/11/2023 5901   DES: How many times in the past year have you. .. Used an illegal drug or used a prescription medication for non-medical reasons? Never Filed at: 11/11/2023 0300                      Medical Decision Making  62-year-old female presents the emergency department 4 days UTI-like symptoms consistent with previous episodes.   Physical exam reassuring with no CVA tenderness nonsurgical appearing belly patient not in any acute distress hemodynamically stable. Suspect UTI low suspicion for kidney stone or pyelonephritis given physical exam findings. Did recently have bilateral salpingectomies no concern for pregnancy at this time. We will proceed with UA anticipate will be positive so can discharge home safely with oral antibiotics and follow-up with family physician. Amount and/or Complexity of Data Reviewed  External Data Reviewed: notes. Labs: ordered. Decision-making details documented in ED Course. Radiology:  Decision-making details documented in ED Course. ECG/medicine tests:  Decision-making details documented in ED Course. Risk  Prescription drug management. Disposition  Final diagnoses:   UTI (urinary tract infection)     Time reflects when diagnosis was documented in both MDM as applicable and the Disposition within this note       Time User Action Codes Description Comment    11/11/2023  8:18 AM Holden Harris Add [N39.0] UTI (urinary tract infection)           ED Disposition       ED Disposition   Discharge    Condition   Stable    Date/Time   Sat Nov 11, 2023 7030    [address] discharge to home/self care. Follow-up Information       Follow up With Specialties Details Why Contact Info    Mirza Jacobson PA-C Physician Assistant   [address] [facility] [address]  [phone]              Patient's Medications   Discharge Prescriptions    CEPHALEXIN (KEFLEX) 500 MG CAPSULE    Take 1 capsule (500 mg total) by mouth every 6 (six) hours for 5 days       Start Date: 11/11/2023End Date: 11/16/2023       Order Dose: 500 mg       Quantity: 20 capsule    Refills: 0       No discharge procedures on file.     PDMP Review         Value Time User    PDMP Reviewed  Yes 7/24/2023  5:16 PM Mack Madden MD            ED Provider  Electronically Signed by             Kenyon Bowen MD  11/11/23 1614

## 2023-11-13 LAB — BACTERIA UR CULT: ABNORMAL

## 2023-11-13 RX ORDER — PENICILLIN V POTASSIUM 500 MG/1
500 TABLET ORAL EVERY 8 HOURS SCHEDULED
Qty: 15 TABLET | Refills: 0 | Status: SHIPPED | OUTPATIENT
Start: 2023-11-13 | End: 2023-11-18

## 2023-11-14 ENCOUNTER — APPOINTMENT (EMERGENCY)
Dept: ULTRASOUND IMAGING | Facility: HOSPITAL | Age: 38
End: 2023-11-14
Payer: COMMERCIAL

## 2023-11-14 ENCOUNTER — HOSPITAL ENCOUNTER (EMERGENCY)
Facility: HOSPITAL | Age: 38
Discharge: HOME | End: 2023-11-14
Attending: EMERGENCY MEDICINE
Payer: COMMERCIAL

## 2023-11-14 ENCOUNTER — APPOINTMENT (EMERGENCY)
Dept: CT IMAGING | Facility: HOSPITAL | Age: 38
End: 2023-11-14
Payer: COMMERCIAL

## 2023-11-14 VITALS
OXYGEN SATURATION: 97 % | DIASTOLIC BLOOD PRESSURE: 68 MMHG | SYSTOLIC BLOOD PRESSURE: 123 MMHG | WEIGHT: 211 LBS | HEIGHT: 66 IN | HEART RATE: 77 BPM | RESPIRATION RATE: 19 BRPM | TEMPERATURE: 96.9 F | BODY MASS INDEX: 33.91 KG/M2

## 2023-11-14 DIAGNOSIS — R10.9 ABDOMINAL PAIN: ICD-10-CM

## 2023-11-14 DIAGNOSIS — K76.9 LIVER LESION: Primary | ICD-10-CM

## 2023-11-14 LAB
ALBUMIN SERPL BCP-MCNC: 4.3 G/DL (ref 3.5–5)
ALP SERPL-CCNC: 70 U/L (ref 34–104)
ALT SERPL W P-5'-P-CCNC: 30 U/L (ref 7–52)
ANION GAP SERPL CALCULATED.3IONS-SCNC: 11 MMOL/L
ANISOCYTOSIS BLD QL SMEAR: ABNORMAL
AST SERPL W P-5'-P-CCNC: 23 U/L (ref 13–39)
BASOPHILS # BLD MANUAL: 0 THOUSAND/UL (ref 0–0.1)
BASOPHILS NFR MAR MANUAL: 0 % (ref 0–1)
BILIRUB SERPL-MCNC: 0.58 MG/DL (ref 0.2–1)
BUN SERPL-MCNC: 11 MG/DL (ref 5–25)
CALCIUM SERPL-MCNC: 9.7 MG/DL (ref 8.4–10.2)
CHLORIDE SERPL-SCNC: 103 MMOL/L (ref 96–108)
CO2 SERPL-SCNC: 23 MMOL/L (ref 21–32)
CREAT SERPL-MCNC: 0.71 MG/DL (ref 0.6–1.3)
EOSINOPHIL # BLD MANUAL: 0.26 THOUSAND/UL (ref 0–0.4)
EOSINOPHIL NFR BLD MANUAL: 3 % (ref 0–6)
ERYTHROCYTE [DISTWIDTH] IN BLOOD BY AUTOMATED COUNT: 14.4 % (ref 11.6–15.1)
GFR SERPL CREATININE-BSD FRML MDRD: 107 ML/MIN/1.73SQ M
GLUCOSE SERPL-MCNC: 83 MG/DL (ref 65–140)
HCT VFR BLD AUTO: 45.1 % (ref 34.8–46.1)
HGB BLD-MCNC: 14.2 G/DL (ref 11.5–15.4)
LACTATE SERPL-SCNC: 0.8 MMOL/L (ref 0.5–2)
LIPASE SERPL-CCNC: 25 U/L (ref 11–82)
LYMPHOCYTES # BLD AUTO: 2.64 THOUSAND/UL (ref 0.6–4.47)
LYMPHOCYTES # BLD AUTO: 27 % (ref 14–44)
MCH RBC QN AUTO: 25.1 PG (ref 26.8–34.3)
MCHC RBC AUTO-ENTMCNC: 31.5 G/DL (ref 31.4–37.4)
MCV RBC AUTO: 80 FL (ref 82–98)
MICROCYTES BLD QL AUTO: ABNORMAL
MONOCYTES # BLD AUTO: 0.62 THOUSAND/UL (ref 0–1.22)
MONOCYTES NFR BLD: 7 % (ref 4–12)
NEUTROPHILS # BLD MANUAL: 5.28 THOUSAND/UL (ref 1.85–7.62)
NEUTS SEG NFR BLD AUTO: 60 % (ref 43–75)
PLATELET # BLD AUTO: 411 THOUSANDS/UL (ref 149–390)
PLATELET BLD QL SMEAR: ADEQUATE
PMV BLD AUTO: 10.2 FL (ref 8.9–12.7)
POTASSIUM SERPL-SCNC: 3.8 MMOL/L (ref 3.5–5.3)
PROT SERPL-MCNC: 7.8 G/DL (ref 6.4–8.4)
RBC # BLD AUTO: 5.65 MILLION/UL (ref 3.81–5.12)
SODIUM SERPL-SCNC: 137 MMOL/L (ref 135–147)
VARIANT LYMPHS # BLD AUTO: 3 %
WBC # BLD AUTO: 8.8 THOUSAND/UL (ref 4.31–10.16)

## 2023-11-14 PROCEDURE — 83605 ASSAY OF LACTIC ACID: CPT | Performed by: PHYSICIAN ASSISTANT

## 2023-11-14 PROCEDURE — 96374 THER/PROPH/DIAG INJ IV PUSH: CPT

## 2023-11-14 PROCEDURE — 83690 ASSAY OF LIPASE: CPT | Performed by: PHYSICIAN ASSISTANT

## 2023-11-14 PROCEDURE — 36415 COLL VENOUS BLD VENIPUNCTURE: CPT | Performed by: PHYSICIAN ASSISTANT

## 2023-11-14 PROCEDURE — 80053 COMPREHEN METABOLIC PANEL: CPT | Performed by: PHYSICIAN ASSISTANT

## 2023-11-14 PROCEDURE — 99285 EMERGENCY DEPT VISIT HI MDM: CPT | Performed by: PHYSICIAN ASSISTANT

## 2023-11-14 PROCEDURE — 85007 BL SMEAR W/DIFF WBC COUNT: CPT | Performed by: PHYSICIAN ASSISTANT

## 2023-11-14 PROCEDURE — 96375 TX/PRO/DX INJ NEW DRUG ADDON: CPT

## 2023-11-14 PROCEDURE — 74177 CT ABD & PELVIS W/CONTRAST: CPT

## 2023-11-14 PROCEDURE — 99284 EMERGENCY DEPT VISIT MOD MDM: CPT

## 2023-11-14 PROCEDURE — 96361 HYDRATE IV INFUSION ADD-ON: CPT

## 2023-11-14 PROCEDURE — 85027 COMPLETE CBC AUTOMATED: CPT | Performed by: PHYSICIAN ASSISTANT

## 2023-11-14 PROCEDURE — 96376 TX/PRO/DX INJ SAME DRUG ADON: CPT

## 2023-11-14 PROCEDURE — 76705 ECHO EXAM OF ABDOMEN: CPT

## 2023-11-14 PROCEDURE — G1004 CDSM NDSC: HCPCS

## 2023-11-14 RX ORDER — ONDANSETRON 2 MG/ML
4 INJECTION INTRAMUSCULAR; INTRAVENOUS ONCE
Status: COMPLETED | OUTPATIENT
Start: 2023-11-14 | End: 2023-11-14

## 2023-11-14 RX ORDER — DICYCLOMINE HCL 20 MG
20 TABLET ORAL 2 TIMES DAILY
Qty: 20 TABLET | Refills: 0 | Status: SHIPPED | OUTPATIENT
Start: 2023-11-14 | End: 2023-11-22 | Stop reason: SDUPTHER

## 2023-11-14 RX ADMIN — IOHEXOL 100 ML: 350 INJECTION, SOLUTION INTRAVENOUS at 17:20

## 2023-11-14 RX ADMIN — MORPHINE SULFATE 2 MG: 2 INJECTION, SOLUTION INTRAMUSCULAR; INTRAVENOUS at 16:15

## 2023-11-14 RX ADMIN — SODIUM CHLORIDE 1000 ML: 0.9 INJECTION, SOLUTION INTRAVENOUS at 16:13

## 2023-11-14 RX ADMIN — MORPHINE SULFATE 2 MG: 2 INJECTION, SOLUTION INTRAMUSCULAR; INTRAVENOUS at 18:18

## 2023-11-14 RX ADMIN — ONDANSETRON 4 MG: 2 INJECTION INTRAMUSCULAR; INTRAVENOUS at 16:14

## 2023-11-14 NOTE — ED PROVIDER NOTES
History  Chief Complaint   Patient presents with    Abdominal Pain     Patient seen here on Saturday diagnosed with UTI. Patient states with UTIs she always gets a stomach ache. Patient states the stomach ache is getting worse. This is a 27-year-old female here for evaluation of abdominal pain. She was seen here over the weekend which is now about 4 days ago diagnosed with a UTI she was initially started on Keflex however culture grew out greater than 100,000 colony count of lactobacillus species she was started then on penicillin VK she is taken about 24 hours of the Pen-Vee K. She admits to epigastric abdominal pain nausea pain after eating or drinking. Past surgical history is positive for cholecystectomy as well as removal of her adnexal structures in the past.  She states her urinary symptoms have improved. She denies fevers chills or sweats        Prior to Admission Medications   Prescriptions Last Dose Informant Patient Reported? Taking?    cephalexin (KEFLEX) 500 mg capsule   No No   Sig: Take 1 capsule (500 mg total) by mouth every 6 (six) hours for 5 days   omeprazole (PriLOSEC) 20 mg delayed release capsule   No No   Sig: Take 1 capsule (20 mg total) by mouth daily PRN   penicillin V potassium (VEETID) 500 mg tablet   No No   Sig: Take 1 tablet (500 mg total) by mouth every 8 (eight) hours for 5 days   sucralfate (CARAFATE) 1 g tablet   No No   Sig: Take 1 tablet (1 g total) by mouth 4 (four) times a day      Facility-Administered Medications: None       Past Medical History:   Diagnosis Date    GERD (gastroesophageal reflux disease)     Polycystic ovary syndrome     Sickle cell trait (HCC)        Past Surgical History:   Procedure Laterality Date    CHOLECYSTECTOMY      [place] HYSTEROSCOPY BX ENDOMETRIUM&/POLYPC W/WO D&C N/A 7/31/2023    Procedure: (D&C) W/ HYSTEROSCOPY;  Surgeon: Anthony Castillo MD;  Location: [facility];  Service: Gynecology    [place] LAPAROSCOPY W/RMVL ADNEXAL STRUCTURES Bilateral 2023    Procedure: SALPINGECTOMY, LAP;  Surgeon: Janae Rascon MD;  Location: [facility];  Service: Gynecology    [place] TX MISSED  FIRST TRIMESTER SURGICAL N/A 5/15/2023    Procedure: (D&E) SUCTION RETAINED PRODUCTS OF CONCEPTION AFTER DELIVERY;  Surgeon: Janae Rascon MD;  Location: [facility];  Service: Gynecology       Family History   Problem Relation Age of Onset    Diabetes Mother     Hypertension Mother     Kidney disease Mother     Diabetes Father     Hypertension Father     No Known Problems Daughter     No Known Problems Son     Diabetes Maternal Grandmother     Alzheimer's disease Maternal Grandfather     Liver cancer Paternal Grandmother     Breast cancer Neg Hx     Colon cancer Neg Hx     Ovarian cancer Neg Hx      I have reviewed and agree with the history as documented. E-Cigarette/Vaping    E-Cigarette Use Never User      E-Cigarette/Vaping Substances    Nicotine No     THC No     CBD No     Flavoring No     Other No     Unknown No      Social History     Tobacco Use    Smoking status: Never    Smokeless tobacco: Never   Vaping Use    Vaping Use: Never used   Substance Use Topics    Alcohol use: Not Currently    Drug use: Never       Review of Systems   Constitutional:  Negative for chills and fever. HENT:  Negative for ear pain and sore throat. Eyes:  Negative for pain and visual disturbance. Respiratory:  Negative for cough and shortness of breath. Cardiovascular:  Negative for chest pain and palpitations. Gastrointestinal:  Positive for abdominal pain and nausea. Negative for vomiting. Genitourinary:  Negative for dysuria and hematuria. Musculoskeletal:  Negative for arthralgias and back pain. Skin:  Negative for color change and rash. Neurological:  Negative for seizures and syncope. All other systems reviewed and are negative. Physical Exam  Physical Exam  Vitals reviewed. Constitutional:       General: She is not in acute distress.      Appearance: She is well-developed. She is not ill-appearing, toxic-appearing or diaphoretic. HENT:      Right Ear: External ear normal. No swelling. Tympanic membrane is not bulging. Left Ear: External ear normal. No swelling. Tympanic membrane is not bulging. Nose: Nose normal.      Mouth/Throat:      Pharynx: No oropharyngeal exudate. Eyes:      General: Lids are normal.      Conjunctiva/sclera: Conjunctivae normal.      Pupils: Pupils are equal, round, and reactive to light. Neck:      Thyroid: No thyromegaly. Vascular: No JVD. Trachea: No tracheal deviation. Cardiovascular:      Rate and Rhythm: Normal rate and regular rhythm. Pulses: Normal pulses. Heart sounds: Normal heart sounds. No murmur heard. No friction rub. No gallop. Pulmonary:      Effort: Pulmonary effort is normal. No respiratory distress. Breath sounds: Normal breath sounds. No stridor. No wheezing or rales. Chest:      Chest wall: No tenderness. Abdominal:      General: Bowel sounds are normal. There is no distension. Palpations: Abdomen is soft. There is no mass. Tenderness: There is abdominal tenderness. There is no guarding or rebound. Negative signs include Sawant's sign. Hernia: No hernia is present. Musculoskeletal:         General: No tenderness. Normal range of motion. Cervical back: Normal range of motion and neck supple. No edema. Normal range of motion. Lymphadenopathy:      Cervical: No cervical adenopathy. Skin:     General: Skin is warm and dry. Capillary Refill: Capillary refill takes less than 2 seconds. Coloration: Skin is not pale. Findings: No erythema or rash. Neurological:      Mental Status: She is alert and oriented to person, place, and time. GCS: GCS eye subscore is 4. GCS verbal subscore is 5. GCS motor subscore is 6. Cranial Nerves: No cranial nerve deficit. Sensory: No sensory deficit.       Deep Tendon Reflexes: Reflexes are normal and symmetric.    Psychiatric:         Speech: Speech normal.         Behavior: Behavior normal.         Vital Signs  ED Triage Vitals [11/14/23 1536]   Temperature Pulse Respirations Blood Pressure SpO2   (!) 96.9 °F (36.1 °C) 82 18 128/68 99 %      Temp Source Heart Rate Source Patient Position - Orthostatic VS BP Location FiO2 (%)   Temporal Monitor Lying Left arm --      Pain Score       9           Vitals:    11/14/23 1536 11/14/23 1642 11/14/23 1815   BP: 128/68 109/61 123/68   Pulse: 82 73 77   Patient Position - Orthostatic VS: Lying           Visual Acuity      ED Medications  Medications   sodium chloride 0.9 % bolus 1,000 mL (0 mL Intravenous Stopped 11/14/23 1811)   ondansetron (ZOFRAN) injection 4 mg (4 mg Intravenous Given 11/14/23 1614)   morphine injection 2 mg (2 mg Intravenous Given 11/14/23 1615)   iohexol (OMNIPAQUE) 350 MG/ML injection (MULTI-DOSE) 100 mL (100 mL Intravenous Given 11/14/23 1720)   morphine injection 2 mg (2 mg Intravenous Given 11/14/23 1818)       Diagnostic Studies  Results Reviewed       Procedure Component Value Units Date/Time    RBC Morphology Reflex Test [[id number]] Collected: 11/14/23 1559    Lab Status: Final result Specimen: Blood from Arm, Right Updated: 11/14/23 1701    Manual Differential(PHLEBS Do Not Order) [[id number]]  (Abnormal) Collected: 11/14/23 1559    Lab Status: Final result Specimen: Blood from Arm, Right Updated: 11/14/23 1640     Segmented % 60 %      Lymphocytes % 27 %      Monocytes % 7 %      Eosinophils, % 3 %      Basophils % 0 %      Atypical Lymphocytes % 3 %      Absolute Neutrophils 5.28 Thousand/uL      Lymphocytes Absolute 2.64 Thousand/uL      Monocytes Absolute 0.62 Thousand/uL      Eosinophils Absolute 0.26 Thousand/uL      Basophils Absolute 0.00 Thousand/uL      Total Counted --     Platelet Estimate Adequate     Anisocytosis 1+     Microcytes 1+    CBC and differential [[id number]]  (Abnormal) Collected: 11/14/23 1559    Lab Status: Final result Specimen: Blood from Arm, Right Updated: 11/14/23 1640     WBC 8.80 Thousand/uL      RBC 5.65 Million/uL      Hemoglobin 14.2 g/dL      Hematocrit 45.1 %      MCV 80 fL      MCH 25.1 pg      MCHC 31.5 g/dL      RDW 14.4 %      MPV 10.2 fL      Platelets 223 Thousands/uL     Narrative: This is an appended report. These results have been appended to a previously verified report. Lactic acid, plasma (w/reflex if result > 2.0) [[id number]]  (Normal) Collected: 11/14/23 1559    Lab Status: Final result Specimen: Blood from Arm, Right Updated: 11/14/23 1620     LACTIC ACID 0.8 mmol/L     Narrative:      Result may be elevated if tourniquet was used during collection.     Comprehensive metabolic panel [[id number]] Collected: 11/14/23 1559    Lab Status: Final result Specimen: Blood from Arm, Right Updated: 11/14/23 1620     Sodium 137 mmol/L      Potassium 3.8 mmol/L      Chloride 103 mmol/L      CO2 23 mmol/L      ANION GAP 11 mmol/L      BUN 11 mg/dL      Creatinine 0.71 mg/dL      Glucose 83 mg/dL      Calcium 9.7 mg/dL      AST 23 U/L      ALT 30 U/L      Alkaline Phosphatase 70 U/L      Total Protein 7.8 g/dL      Albumin 4.3 g/dL      Total Bilirubin 0.58 mg/dL      eGFR 107 ml/min/1.73sq m     Narrative:      [facility] guidelines for Chronic Kidney Disease (CKD):     Stage 1 with normal or high GFR (GFR > 90 mL/min/1.73 square meters)    Stage 2 Mild CKD (GFR = 60-89 mL/min/1.73 square meters)    Stage 3A Moderate CKD (GFR = 45-59 mL/min/1.73 square meters)    Stage 3B Moderate CKD (GFR = 30-44 mL/min/1.73 square meters)    Stage 4 Severe CKD (GFR = 15-29 mL/min/1.73 square meters)    Stage 5 End Stage CKD (GFR <15 mL/min/1.73 square meters)  Note: GFR calculation is accurate only with a steady state creatinine    Lipase [[id number]]  (Normal) Collected: 11/14/23 1559    Lab Status: Final result Specimen: Blood from Arm, Right Updated: 11/14/23 1620     Lipase 25 u/L                    US right upper quadrant   Final Result by Ezra Reeves MD (85/08 [id number])      Vague hypoechoic area in the anterior right hepatic lobe as above, indeterminate. This may correspond to the hypodensity seen on the prior CT study. This is indeterminate. A follow-up liver protocol MRI study without and with intravenous gadolinium will    be helpful for further characterization. Mild prominence of the common bile duct likely due to a postcholecystectomy state. Workstation performed: [id number]         CT abdomen pelvis with contrast   Final Result by Olivia De La Vega MD (11/14 8055)   Possible hepatic lesion. Right upper quadrant ultrasound is recommended for further assessment. The study was marked in [facility] for immediate notification. Workstation performed: [id number]                    Procedures  Procedures         ED Course  ED Course as of 11/14/23 1848 Tue Nov 14, 2023   1610 WBC: 8.80   1610 Hemoglobin: 14.2   1610 Platelet Count(!): 772  CBC reviewed there is no leukocytosis or anemia   1610 She has elevated platelet count consistent with prior. Vital signs reviewed within reasonable limits   1631 LACTIC ACID: 0.8   1631 Lipase: 25  CMP reviewed within normal limits lipase normal   1631 Lactic acid normal   1745 IMPRESSION:  Possible hepatic lesion. Right upper quadrant ultrasound is recommended for further assessment. The study was marked in [facility] for immediate notification. 1837 IMPRESSION:     Vague hypoechoic area in the anterior right hepatic lobe as above, indeterminate. This may correspond to the hypodensity seen on the prior CT study. This is indeterminate. A follow-up liver protocol MRI study without and with intravenous gadolinium will   be helpful for further characterization. Mild prominence of the common bile duct likely due to a postcholecystectomy state.      1839 I did discuss with the patient the findings on the ultrasound and CT scan and the need for MRI follow-up. This is likely an incidental finding she states she has a primary care through [facility] and will follow-up with them to get this ordered                               SBIRT 22yo+      Flowsheet Row Most Recent Value   Initial Alcohol Screen: US AUDIT-C     1. How often do you have a drink containing alcohol? 0 Filed at: 11/14/2023 1536   Audit-C Score 0 Filed at: 11/14/2023 1536   DES: How many times in the past year have you. .. Used an illegal drug or used a prescription medication for non-medical reasons? Never Filed at: 11/14/2023 1536                      Medical Decision Making  39-year-old female here for evaluation of abdominal pain pain after eating or drinking pain is in the epigastric region prior surgical history positive for cholecystectomy and partial hysterectomy, she was seen here started on antibiotics 4 days ago currently taking Pen-Vee K known culture greater than 100,000 colony count of lactobacillus is noted. She will have CT scan of the abdomen to rule out intra-abdominal pathology such as appendicitis intra-abdominal abscess bowel obstruction gastritis. CT scan does not show acute pathology outside of an indeterminate liver lesion also seen on the ultrasound, recommending outpatient liver MRI, I verbalized this with the patient the need for follow-up she states she will follow with primary care to get this ordered. Amount and/or Complexity of Data Reviewed  Labs: ordered. Decision-making details documented in ED Course. Radiology: ordered. Risk  Prescription drug management.              Disposition  Final diagnoses:   Liver lesion   Abdominal pain     Time reflects when diagnosis was documented in both MDM as applicable and the Disposition within this note       Time User Action Codes Description Comment    11/14/2023  6:43 PM Manas Coca Add [K76.9] Liver lesion     11/14/2023  6:45 PM Ludmila CARDONA Add [R10.9] Abdominal pain ED Disposition       ED Disposition   Discharge    Condition   Stable    Date/Time   Tue Nov 14, 2023 [address] discharge to home/self care. Follow-up Information       Follow up With Specialties Details Why Contact Info    Martha Mary PA-C Physician Assistant Schedule an appointment as soon as possible for a visit   [id number] [facility] [id number]  [phone]              Patient's Medications   Discharge Prescriptions    DICYCLOMINE (BENTYL) 20 MG TABLET    Take 1 tablet (20 mg total) by mouth 2 (two) times a day       Start Date: 11/14/2023End Date: --       Order Dose: 20 mg       Quantity: 20 tablet    Refills: 0       No discharge procedures on file.     PDMP Review         Value Time User    PDMP Reviewed  Yes 7/24/2023  5:16 PM Pasquale Haider MD            ED Provider  Electronically Signed by             Mendoza Bliss PA-C  11/14/23 [id number]

## 2023-11-14 NOTE — DISCHARGE INSTRUCTIONS
A follow-up liver protocol MRI study without and with intravenous gadolinium will   be helpful for further characterization.

## 2023-11-22 ENCOUNTER — CONSULT (OUTPATIENT)
Dept: GASTROENTEROLOGY | Facility: CLINIC | Age: 38
End: 2023-11-22
Payer: COMMERCIAL

## 2023-11-22 VITALS
HEIGHT: 66 IN | TEMPERATURE: 97.4 F | BODY MASS INDEX: 33.91 KG/M2 | SYSTOLIC BLOOD PRESSURE: 107 MMHG | HEART RATE: 92 BPM | WEIGHT: 211 LBS | OXYGEN SATURATION: 95 % | DIASTOLIC BLOOD PRESSURE: 72 MMHG

## 2023-11-22 DIAGNOSIS — K59.04 CHRONIC IDIOPATHIC CONSTIPATION: ICD-10-CM

## 2023-11-22 DIAGNOSIS — K21.9 GASTROESOPHAGEAL REFLUX DISEASE, UNSPECIFIED WHETHER ESOPHAGITIS PRESENT: ICD-10-CM

## 2023-11-22 DIAGNOSIS — R14.0 BLOATING: ICD-10-CM

## 2023-11-22 DIAGNOSIS — K76.9 LIVER LESION: ICD-10-CM

## 2023-11-22 DIAGNOSIS — K29.00 ACUTE GASTRITIS WITHOUT HEMORRHAGE, UNSPECIFIED GASTRITIS TYPE: ICD-10-CM

## 2023-11-22 DIAGNOSIS — R10.84 GENERALIZED ABDOMINAL PAIN: Primary | ICD-10-CM

## 2023-11-22 PROCEDURE — 99204 OFFICE O/P NEW MOD 45 MIN: CPT | Performed by: NURSE PRACTITIONER

## 2023-11-22 RX ORDER — OMEPRAZOLE 40 MG/1
CAPSULE, DELAYED RELEASE ORAL
Qty: 30 CAPSULE | Refills: 2 | Status: SHIPPED | OUTPATIENT
Start: 2023-11-22

## 2023-11-22 RX ORDER — POLYETHYLENE GLYCOL 3350 17 G/17G
17 POWDER, FOR SOLUTION ORAL DAILY
Qty: 578 G | Refills: 2 | Status: SHIPPED | OUTPATIENT
Start: 2023-11-22

## 2023-11-22 RX ORDER — SUCRALFATE 1 G/1
1 TABLET ORAL 4 TIMES DAILY
Qty: 120 TABLET | Refills: 2 | Status: SHIPPED | OUTPATIENT
Start: 2023-11-22

## 2023-11-22 RX ORDER — DICYCLOMINE HCL 20 MG
20 TABLET ORAL 2 TIMES DAILY
Qty: 20 TABLET | Refills: 2 | Status: SHIPPED | OUTPATIENT
Start: 2023-11-22

## 2023-11-22 NOTE — PATIENT INSTRUCTIONS
Proceed with MRI/MRCP   Increase the Omeprazole to 40 mg daily. Start taking the carafate 4 x daily. Start Miralax daily. Continue to drink at least 64 oz or more of water daily. Continue to monitor for red flag symptoms. Hold off EGD/Colonoscopy unless things worsen for now. Schedule a f/u OV in 10-12 weeks. While the patient was in the office today we did review GI red flag symptoms, including, but not limited to: chronic nausea, vomiting, diarrhea, chills, fever, and unintentional weight loss and should call or contact our office with any changes or concerns. I reviewed with the patient that if they notice any blood while vomiting or in their stool they should contact or office or go to the nearest emergency room for immediate evaluation. The patient was agreeable and verbalized an understanding.

## 2023-11-22 NOTE — PROGRESS NOTES
[facility] - Outpatient Consultation  Renée Eng 43 y.o. female MRN: [id number]  Encounter: [id number]          ASSESSMENT AND PLAN:    The patient presents today for an initial consultation for her generalized abdominal pain, chronic idiopathic constipation, and GERD. Exam:  Oral mucosa normal upon visual inspection, without any sores, lesions, or ulcerations. Sclera without icterus and benign. Lung sounds CTA b/l. Normal S1 & S2 upon exam. Abdomen is soft, flat, with mild but diffuse tenderness noted upon exam in all 4 quadrants without any significant guarding or rebound tenderness noted and faint bowel sounds x4. No edema noted of the b/l lower extremities upon exam today. Skin is non-icteric. 1. Generalized abdominal pain  2. Acute gastritis without hemorrhage, unspecified gastritis type  3. Gastroesophageal reflux disease, unspecified whether esophagitis present  While the patient was in the office today, I discussed with the patient that at this point time since there definitely seems to be a component of reflux and heartburn symptoms, for now, we will increase her omeprazole to 40 mg daily, continue the Carafate, and I did prescribe Bentyl 20 mg twice daily as needed for abdominal pain and spasms. For now we will hold off any procedures as the patient is more concerned with the liver lesion, but if we would not see any significant improvement in her symptoms, we would then consider an EGD in the future. The patient is to contact our office if she has any side effects or issues. The patient was agreeable and verbalized an understanding. Encouraged the patient to try to avoid trigger foods, including alcohol.    - dicyclomine (BENTYL) 20 mg tablet; Take 1 tablet (20 mg total) by mouth 2 (two) times a day  Dispense: 20 tablet; Refill: 2  - Ambulatory Referral to Gastroenterology  - sucralfate (CARAFATE) 1 g tablet;  Take 1 tablet (1 g total) by mouth 4 (four) times a day  Dispense: 120 tablet; Refill: 2  - omeprazole (PriLOSEC) 40 MG capsule; Take 1 PO QD  Dispense: 30 capsule; Refill: 2    4. Bloating  5. Chronic idiopathic constipation  I discussed with the patient that I actually feel that there is a component of constipation with overflow diarrhea and at this point it would be in her best interest to start on MiraLAX daily and see how she does. The patient was agreeable and verbalized an understanding. Encouraged the patient to try to drink at least 64 ounces of water daily. We discussed that for now we will also hold off on a colonoscopy and have her continue to monitor for red flag symptoms, however, if her symptoms do not improve in the near future, we would also consider proceeding with a colonoscopy as well. The patient was agreeable and verbalized an understanding. While the patient was in the office today we did review GI red flag symptoms, including, but not limited to: chronic nausea, vomiting, diarrhea, chills, fever, and unintentional weight loss and should call or contact our office with any changes or concerns. I reviewed with the patient that if they notice any blood while vomiting or in their stool they should contact or office or go to the nearest emergency room for immediate evaluation. The patient was agreeable and verbalized an understanding.    - polyethylene glycol (GLYCOLAX) 17 GM/SCOOP powder; Take 17 g by mouth daily  Dispense: 578 g; Refill: 2    6. Liver lesion  I discussed with the patient that this point time as per recommendations we will proceed with the MRI/MRCP of the abdomen with and without contrast for further evaluation of the possible liver lesion. I advised patient once we have the results of the MRI, our office will give her a call to review the results and discuss any other treatment plan recommendations.   The patient was agreeable and verbalized an understanding.    - MRI abdomen w wo contrast and mrcp; Future     The patient will schedule a follow up office visit in 10 to 12 weeks. The patient was agreeable and verbalized an understanding.     ______________________________________________________________________    HPI: The patient is a 43 y.o. female who presents today for a consultation regarding her generalized abdominal pain, chronic idiopathic constipation, and GERD. The patient reports that she has always dealt with GI issues, however, she feels that over the past 1 to 2 months her symptoms have continued to worsen, especially after she eats as she feels that typically within 5 to 10 minutes after eating she has to have a bowel movement of very loose stool. She also reports that she has recently noticed worsening bloating, nausea, and decreased appetite without any unplanned weight loss. The patient reports that she has several bowel movements throughout the day that are typically loose in nature, but are frequent and urgent. Most recently during an ER visit a questionable liver lesion was noted upon a CT scan and confirmed with a right upper quadrant ultrasound with an MRI/MRCP being recommended for follow-up. The patient denies any reflux, dysphagia, vomiting, decreased appetite, unplanned weight loss. Water Intake: 10 bottles daily. The patient denies eating any raw or uncooked fish, seafood, or sushi in the past 6-8 months. The patient reports that they have a BM 4-5 times and reports that it is sometimes relieving, without any consistent diarrhea, nocturnal BMs, constipation, straining, melena or bloody stools. Last BM: Today. Flatus: Yes.     PMH/PSH:   Abdominal/Chest Surgery: Cholecystis, D&C, Tubal Ligation  Colon Cancer: Denied  Any Cancer: Denied  Pre-Cancerous Polyps: Denied  Crohn's: Denied  Ulcerative Colitis: Denied    Tobacco/Vaping: Denied  ETOH: Rarely  Marijuana: Denied  Illicit Drug Use: Denied    FH:  Colon Cancer: Denied  Any Cancer: Denied  Family Members with Pre-Cancerous Polyps: Denied  Crohn's: Denied  Ulcerative Colitis: Denied    Meds: Omeprazole 20 mg daily and Carafate 1 g 4 times daily. NSAID Use: Denied    Imaging: (11/14/23) CT scan of the abdomen and pelvis with contrast: Possible hepatic lesion. Right upper quadrant ultrasound is recommended for further assessment. (11/14/23) U/S of the Right Upper Quad: Vague hypoechoic area in the anterior right hepatic lobe as above, indeterminate. This may correspond to the hypodensity seen on the prior CT study. This is indeterminate. A follow-up liver protocol MRI study without and with intravenous gadolinium will   be helpful for further characterization. Mild prominence of the common bile duct likely due to a postcholecystectomy state. Endoscopy History: EGD: (None):     COLONOSCOPY: (None): REVIEW OF SYSTEMS:    CONSTITUTIONAL: Denies any fever, chills, rigors, and weight loss. HEENT: No earache or tinnitus. Denies hearing loss or visual disturbances. CARDIOVASCULAR: No chest pain or palpitations. RESPIRATORY: Denies any cough, hemoptysis, shortness of breath or dyspnea on exertion. GASTROINTESTINAL: As noted in the History of Present Illness. GENITOURINARY: No problems with urination. Denies any hematuria or dysuria. NEUROLOGIC: No dizziness or vertigo, denies headaches. MUSCULOSKELETAL: Denies any muscle or joint pain. SKIN: Denies skin rashes or itching. ENDOCRINE: Denies excessive thirst. Denies intolerance to heat or cold. PSYCHOSOCIAL: Denies depression or anxiety. Denies any recent memory loss.        Historical Information   Past Medical History:   Diagnosis Date    GERD (gastroesophageal reflux disease)     Polycystic ovary syndrome     Sickle cell trait ([address] [facility])      Past Surgical History:   Procedure Laterality Date    CHOLECYSTECTOMY      [place] HYSTEROSCOPY BX ENDOMETRIUM&/POLYPC W/WO D&C N/A 7/31/2023    Procedure: (D&C) W/ HYSTEROSCOPY;  Surgeon: David Luna MD;  Location: [facility];  Service: Gynecology    VT LAPAROSCOPY W/RMVL ADNEXAL STRUCTURES Bilateral 2023    Procedure: SALPINGECTOMY, LAP;  Surgeon: Jordon Major MD;  Location[facility];  Service: Gynecology    VT TX MISSED  FIRST TRIMESTER SURGICAL N/A 5/15/2023    Procedure: (D&E) SUCTION RETAINED PRODUCTS OF CONCEPTION AFTER DELIVERY;  Surgeon: Jordon Major MD;  Location[facility];  Service: Gynecology     Social History   Social History     Substance and Sexual Activity   Alcohol Use Not Currently     Social History     Substance and Sexual Activity   Drug Use Never     Social History     Tobacco Use   Smoking Status Never   Smokeless Tobacco Never     Family History   Problem Relation Age of Onset    Diabetes Mother     Hypertension Mother     Kidney disease Mother     Diabetes Father     Hypertension Father     No Known Problems Daughter     No Known Problems Son     Diabetes Maternal Grandmother     Alzheimer's disease Maternal Grandfather     Liver cancer Paternal Grandmother     Breast cancer Neg Hx     Colon cancer Neg Hx     Ovarian cancer Neg Hx        Meds/Allergies       Current Outpatient Medications:     dicyclomine (BENTYL) 20 mg tablet    omeprazole (PriLOSEC) 20 mg delayed release capsule    sucralfate (CARAFATE) 1 g tablet    Allergies   Allergen Reactions    Shellfish Allergy - Food Allergy Hives     Patient states she can have CT dye she has no CT dye allergy, has tolerated prior studies last of which was 2022    Shrimp Extract Allergy Skin Test - Food Allergy Hives     Only when she touches, but she can eat them           Objective     Blood pressure 107/72, pulse 92, temperature (!) 97.4 °F (36.3 °C), temperature source Tympanic, height 5' 6" (1.676 m), weight 95.7 kg (211 lb), SpO2 95 %, not currently breastfeeding. Body mass index is 34.06 kg/m². PHYSICAL EXAM:      General Appearance:   Alert, cooperative, no distress   HEENT:   Normocephalic, atraumatic, anicteric. Neck:  Supple, symmetrical, trachea midline   Lungs:   Clear to auscultation bilaterally; no rales, rhonchi or wheezing; respirations unlabored    Heart[de-identified]   Regular rate and rhythm; no murmur, rub, or gallop. Abdomen:   Soft, non-tender, non-distended; normal bowel sounds; no masses, no organomegaly    Genitalia:   Deferred    Rectal:   Deferred    Extremities:  No cyanosis, clubbing or edema    Pulses:  2+ and symmetric    Skin:  No jaundice, rashes, or lesions    Lymph nodes:  No palpable cervical lymphadenopathy        Lab Results:   No visits with results within 1 Day(s) from this visit.    Latest known visit with results is:   Admission on 11/14/2023, Discharged on 11/14/2023   Component Date Value    WBC 11/14/2023 8.80     RBC 11/14/2023 5.65 (H)     Hemoglobin 11/14/2023 14.2     Hematocrit 11/14/2023 45.1     MCV 11/14/2023 80 (L)     MCH 11/14/2023 25.1 (L)     MCHC 11/14/2023 31.5     RDW 11/14/2023 14.4     MPV 11/14/2023 10.2     Platelets 94/47/7461 411 (H)     Sodium 11/14/2023 137     Potassium 11/14/2023 3.8     Chloride 11/14/2023 103     CO2 11/14/2023 23     ANION GAP 11/14/2023 11     BUN 11/14/2023 11     Creatinine 11/14/2023 0.71     Glucose 11/14/2023 83     Calcium 11/14/2023 9.7     AST 11/14/2023 23     ALT 11/14/2023 30     Alkaline Phosphatase 11/14/2023 70     Total Protein 11/14/2023 7.8     Albumin 11/14/2023 4.3     Total Bilirubin 11/14/2023 0.58     eGFR 11/14/2023 107     Lipase 11/14/2023 25     LACTIC ACID 11/14/2023 0.8     Segmented % 11/14/2023 60     Lymphocytes % 11/14/2023 27     Monocytes % 11/14/2023 7     Eosinophils, % 11/14/2023 3     Basophils % 11/14/2023 0     Atypical Lymphocytes % 11/14/2023 3 (H)     Absolute Neutrophils 11/14/2023 5.28     Lymphocytes Absolute 11/14/2023 2.64     Monocytes Absolute 11/14/2023 0.62     Eosinophils Absolute 11/14/2023 0.26     Basophils Absolute 11/14/2023 0.00     Platelet Estimate 46/66/2227 Adequate     Anisocytosis 11/14/2023 1+     Microcytes 11/14/2023 1+          Radiology Results:   Neelima Cheng right upper quadrant    Result Date: 11/14/2023  Narrative: RIGHT UPPER QUADRANT ULTRASOUND INDICATION:     abnormal ct. COMPARISON: Prior CT study performed earlier the same day dated 11/14/2023. TECHNIQUE:   Real-time ultrasound of the right upper quadrant was performed with a curvilinear transducer with both volumetric sweeps and still imaging techniques. FINDINGS: PANCREAS:  Visualized portions of the pancreas are within normal limits. AORTA AND IVC:  Visualized portions are normal for patient age. LIVER: Size:  Within normal range. The liver measures 15.2 cm in the midclavicular line. Contour:  Surface contour is smooth. Parenchyma:  Echogenicity and echotexture are within normal limits. There is a vague hypoechoic area in the anterior right hepatic lobe measuring 4.3 x 2.7 x 1.9 cm. This may correspond to the area of hypodensity seen on the prior CT study. This is indeterminate. No internal vascularity seen. Limited imaging of the main portal vein shows it to be patent and hepatopetal. BILIARY: Status post cholecystectomy. No intrahepatic biliary dilatation. CBD measures 7.0 mm. No choledocholithiasis. KIDNEY: Right kidney measures 10.7 x 4.5 x 4.9 cm. Volume 122.3 mL Kidney within normal limits. ASCITES:   None. Impression: Vague hypoechoic area in the anterior right hepatic lobe as above, indeterminate. This may correspond to the hypodensity seen on the prior CT study. This is indeterminate. A follow-up liver protocol MRI study without and with intravenous gadolinium will be helpful for further characterization. Mild prominence of the common bile duct likely due to a postcholecystectomy state.  Workstation performed: [id number]     CT abdomen pelvis with contrast    Result Date: 11/14/2023  Narrative: CT ABDOMEN AND PELVIS WITH IV CONTRAST INDICATION: Right-sided abdominal pain COMPARISON: CT abdomen pelvis August 12, 2022 TECHNIQUE:  CT examination of the abdomen and pelvis was performed. Multiplanar 2D reformatted images were created from the source data. This examination, like all CT scans performed in the [facility], was performed utilizing techniques to minimize radiation dose exposure, including the use of iterative reconstruction and automated exposure control. Radiation dose length product (DLP) for this visit:  1288.58 mGy-cm IV Contrast:  100 mL of iohexol (OMNIPAQUE) Enteric Contrast:  Enteric contrast was not administered. FINDINGS: ABDOMEN LOWER CHEST:  No clinically significant abnormality identified in the visualized lower chest. LIVER/BILIARY TREE: No surface nodularity. Hypoattenuating tissue in superior segment 8 with ill-defined margins measures up to 2.7 cm, and lacks a correlate on the two prior CT examinations (series 601, image 41; series 2, image 17). No biliary ductal dilation. Patent hepatic and portal veins. GALLBLADDER: Gallbladder is surgically absent. SPLEEN:  Unremarkable. PANCREAS:  Unremarkable. ADRENAL GLANDS:  Unremarkable. KIDNEYS/URETERS:  Unremarkable. No hydronephrosis. STOMACH AND BOWEL:  Unremarkable. APPENDIX: A normal appendix was visualized. ABDOMINOPELVIC CAVITY:  No ascites. No pneumoperitoneum. No lymphadenopathy. VESSELS:  Unremarkable for patient's age. PELVIS REPRODUCTIVE ORGANS:  Unremarkable for patient's age. URINARY BLADDER:  Unremarkable. ABDOMINAL WALL/INGUINAL REGIONS:  Unremarkable. OSSEOUS STRUCTURES:  No acute fracture or destructive osseous lesion. Impression: Possible hepatic lesion. Right upper quadrant ultrasound is recommended for further assessment. The study was marked in [facility] for immediate notification.  Workstation performed: [id number]    Answers submitted by the patient for this visit:  Abdominal Pain Questionnaire (Submitted on 11/15/2023)  Chief Complaint: Abdominal pain  Chronicity: recurrent  Onset: 1 to 4 weeks ago  Onset quality: undetermined  Frequency: constantly  Episode duration: 10 Days  Progression since onset: gradually worsening  Pain location: generalized abdominal region  Pain - numeric: 10/10  Pain quality: cramping, a sensation of fullness, sharp, tearing  Radiates to: epigastric region  anorexia: Yes  arthralgias: No  belching: Yes  constipation: No  diarrhea: Yes  dysuria: Yes  fever: Yes  flatus: Yes  frequency: Yes  headaches: No  hematochezia: No  hematuria: No  melena: No  myalgias: No  nausea: Yes  weight loss: No  vomiting: No  Aggravated by: being still, eating, movement  Relieved by: bowel movements  Diagnostic workup: CT scan, surgery, ultrasound

## 2023-11-26 PROBLEM — K76.9 LIVER LESION: Status: ACTIVE | Noted: 2023-11-26

## 2023-11-26 PROBLEM — R10.84 GENERALIZED ABDOMINAL PAIN: Status: ACTIVE | Noted: 2023-11-26

## 2023-11-26 PROBLEM — K59.04 CHRONIC IDIOPATHIC CONSTIPATION: Status: ACTIVE | Noted: 2023-11-26

## 2023-11-26 PROBLEM — R14.0 BLOATING: Status: ACTIVE | Noted: 2023-11-26

## 2023-11-30 ENCOUNTER — HOSPITAL ENCOUNTER (OUTPATIENT)
Dept: MRI IMAGING | Facility: HOSPITAL | Age: 38
Discharge: HOME | End: 2023-11-30
Payer: COMMERCIAL

## 2023-11-30 DIAGNOSIS — K76.9 LIVER LESION: ICD-10-CM

## 2023-11-30 PROCEDURE — A9585 GADOBUTROL INJECTION: HCPCS | Performed by: NURSE PRACTITIONER

## 2023-11-30 PROCEDURE — 74183 MRI ABD W/O CNTR FLWD CNTR: CPT

## 2023-11-30 PROCEDURE — G1004 CDSM NDSC: HCPCS

## 2023-11-30 RX ORDER — GADOBUTROL 604.72 MG/ML
9 INJECTION INTRAVENOUS
Status: COMPLETED | OUTPATIENT
Start: 2023-11-30 | End: 2023-11-30

## 2023-11-30 RX ADMIN — GADOBUTROL 9 ML: 604.72 INJECTION INTRAVENOUS at 08:56

## 2024-02-12 ENCOUNTER — TELEPHONE (OUTPATIENT)
Age: 39
End: 2024-02-12

## 2024-02-12 NOTE — TELEPHONE ENCOUNTER
Due to inclement weather, providers only doing virtual visits. LVM for patient to call back to change to virtual or to r/s appt. If patient would like virtual please verify if patient wants to do it through [employer], have a link sent to them or a phone call visit.

## 2024-03-19 ENCOUNTER — TELEPHONE (OUTPATIENT)
Dept: FAMILY MEDICINE CLINIC | Facility: CLINIC | Age: 39
End: 2024-03-19

## 2024-03-19 NOTE — TELEPHONE ENCOUNTER
----- Message from Danae Jenkins LPN sent at 3/19/2024  3:40 PM EDT -----  Regarding: FW: Dorothea Wahl   Contact: [phone]    ----- Message -----  From: Dorothea Wahl  Sent: 3/19/2024   3:15 PM EDT  To: [facility]  Subject: Dorothea Wahl                                  Good afternoon dr. MAKI applied for life insurance but got denied because of the referrals to the neurologist and the gastroenterologist.  They are requesting a letter of good health from you since you were the one who referred.  They just need to know I don’t have a terminal illness.     Are you able to write the letter?

## 2024-08-06 DIAGNOSIS — K21.9 GASTROESOPHAGEAL REFLUX DISEASE, UNSPECIFIED WHETHER ESOPHAGITIS PRESENT: ICD-10-CM

## 2024-08-06 DIAGNOSIS — K29.00 ACUTE GASTRITIS WITHOUT HEMORRHAGE, UNSPECIFIED GASTRITIS TYPE: ICD-10-CM

## 2024-08-06 RX ORDER — OMEPRAZOLE 40 MG/1
CAPSULE, DELAYED RELEASE ORAL
Qty: 30 CAPSULE | Refills: 5 | Status: SHIPPED | OUTPATIENT
Start: 2024-08-06

## 2024-08-06 RX ORDER — SUCRALFATE 1 G/1
1 TABLET ORAL 4 TIMES DAILY
Qty: 120 TABLET | Refills: 5 | Status: SHIPPED | OUTPATIENT
Start: 2024-08-06

## 2024-12-23 NOTE — TELEPHONE ENCOUNTER
----- Message from Amalia Hobbs MD sent at 4/24/2023 10:32 AM EDT -----  Regarding: Schedule IOL  Induction of Labor with Oxytocin  Elective  Saturday AM 5/6/2023 with Tanisha López be scheduled until this Friday 4 Eyes Skin Assessment Completed by JORDYN Calderon and JORDYN Perez.    Head WDL  Ears Redness and Blanching  Nose Redness and Blanching to bridge of nose, scabbed  Mouth WDL  Neck WDL  Breast/Chest WDL  Shoulder Blades WDL  Spine WDL  (R) Arm/Elbow/Hand Bruising and Discoloration  (L) Arm/Elbow/Hand Bruising and Discoloration  Abdomen Scattered bruising, scabbing to RLQ  Groin Pink excoriation to pannus  Scrotum/Coccyx/Buttocks Redness and Blanching  (R) Leg Wound Vac and Ace Wrap in place to RLE hematoma. Bruising and dicoloration to knee. Edema  (L) Leg Bruising, Discoloration, Scarring to knee, and Edema  (R) Heel/Foot/Toe DIP to most of foot, redness and blanching to toes  (L) Heel/Foot/Toe Redness, Blanching, and Edema          Devices In Places Pulse Ox, SCD's, Condom Cath, and Nasal Cannula. CPAP at night      Interventions In Place NC W/Ear Foams, InterDry, Heel Mepilex, Sacral Mepilex, TAP System, Pillows, Low Air Loss Mattress, and Barrier Cream    Possible Skin Injury Yes    Pictures Uploaded Into Epic Yes, previously  Wound Consult Placed Yes, prev.  RN Wound Prevention Protocol Ordered Yes, prev.

## (undated) DEVICE — IV EXTENSION TUBING 33 IN

## (undated) DEVICE — D + E CONNECTION HOSE

## (undated) DEVICE — BETHLEHEM UNIVERSAL MINOR VAG: Brand: CARDINAL HEALTH

## (undated) DEVICE — UNDER BUTTOCKS DRAPE W/FLUID CONTROL POUCH: Brand: CONVERTORS

## (undated) DEVICE — 2000CC GUARDIAN II: Brand: GUARDIAN

## (undated) DEVICE — GLOVE PI ULTRA TOUCH SZ.7.0

## (undated) DEVICE — TROCAR: Brand: KII SLEEVE

## (undated) DEVICE — PLASTIC ADHESIVE BANDAGE: Brand: CURITY

## (undated) DEVICE — SUT MONOCRYL 4-0 PS-2 27 IN Y426H

## (undated) DEVICE — BETHLEHEM UNIVERSAL GYN LAP PK: Brand: CARDINAL HEALTH

## (undated) DEVICE — DRAPE EQUIPMENT RF WAND

## (undated) DEVICE — STRL ALLENTOWN HYSTEROSCOPY PK: Brand: CARDINAL HEALTH

## (undated) DEVICE — PREMIUM DRY TRAY LF: Brand: MEDLINE INDUSTRIES, INC.

## (undated) DEVICE — PVC URETHRAL CATHETER: Brand: DOVER

## (undated) DEVICE — INTENDED FOR TISSUE SEPARATION, AND OTHER PROCEDURES THAT REQUIRE A SHARP SURGICAL BLADE TO PUNCTURE OR CUT.: Brand: BARD-PARKER SAFETY BLADES SIZE 11, STERILE

## (undated) DEVICE — D + E SUCTION CANISTER

## (undated) DEVICE — CURETTE VACURETTE CRVD 8MM

## (undated) DEVICE — CHLORAPREP HI-LITE 26ML ORANGE

## (undated) DEVICE — SCD SEQUENTIAL COMPRESSION COMFORT SLEEVE MEDIUM KNEE LENGTH: Brand: KENDALL SCD

## (undated) DEVICE — GLOVE INDICATOR PI UNDERGLOVE SZ 7.5 BLUE

## (undated) DEVICE — ENSEAL X1 STRAIGHT 37CM SHAFT: Brand: HARMONIC

## (undated) DEVICE — [HIGH FLOW INSUFFLATOR,  DO NOT USE IF PACKAGE IS DAMAGED,  KEEP DRY,  KEEP AWAY FROM SUNLIGHT,  PROTECT FROM HEAT AND RADIOACTIVE SOURCES.]: Brand: PNEUMOSURE

## (undated) DEVICE — COLLECTION SET, DISPOSABLE WITH HANDLE AND TAPERED FITTINGS TUBING, 6 FT (183 CM): Brand: GYRUS ACMI

## (undated) DEVICE — CYSTO TUBING SINGLE IRRIGATION

## (undated) DEVICE — D + E SAFE TOUCH TISSUE TRAP (CIRCON)